# Patient Record
Sex: MALE | Race: BLACK OR AFRICAN AMERICAN | Employment: FULL TIME | URBAN - METROPOLITAN AREA
[De-identification: names, ages, dates, MRNs, and addresses within clinical notes are randomized per-mention and may not be internally consistent; named-entity substitution may affect disease eponyms.]

---

## 2017-01-06 DIAGNOSIS — I10 ESSENTIAL HYPERTENSION: ICD-10-CM

## 2017-01-06 RX ORDER — HYDROCHLOROTHIAZIDE 25 MG/1
TABLET ORAL
Qty: 90 TAB | Refills: 3 | Status: SHIPPED | OUTPATIENT
Start: 2017-01-06 | End: 2017-10-17 | Stop reason: SDUPTHER

## 2017-01-06 RX ORDER — AMLODIPINE BESYLATE 5 MG/1
TABLET ORAL
Qty: 90 TAB | Refills: 3 | Status: SHIPPED | OUTPATIENT
Start: 2017-01-06 | End: 2017-11-03 | Stop reason: SDUPTHER

## 2017-01-20 DIAGNOSIS — N52.9 ERECTILE DYSFUNCTION, UNSPECIFIED ERECTILE DYSFUNCTION TYPE: ICD-10-CM

## 2017-01-20 RX ORDER — TADALAFIL 10 MG/1
10 TABLET ORAL
Qty: 4 TAB | Refills: 3 | Status: SHIPPED | OUTPATIENT
Start: 2017-01-20 | End: 2018-06-14 | Stop reason: SDUPTHER

## 2017-01-27 ENCOUNTER — OFFICE VISIT (OUTPATIENT)
Dept: FAMILY MEDICINE CLINIC | Age: 62
End: 2017-01-27

## 2017-01-27 VITALS
DIASTOLIC BLOOD PRESSURE: 78 MMHG | SYSTOLIC BLOOD PRESSURE: 158 MMHG | HEIGHT: 67 IN | HEART RATE: 80 BPM | RESPIRATION RATE: 16 BRPM | TEMPERATURE: 97 F | BODY MASS INDEX: 26.45 KG/M2 | WEIGHT: 168.5 LBS

## 2017-01-27 DIAGNOSIS — B18.2 CHRONIC HEPATITIS C WITHOUT HEPATIC COMA (HCC): ICD-10-CM

## 2017-01-27 DIAGNOSIS — E78.5 HYPERLIPIDEMIA, UNSPECIFIED HYPERLIPIDEMIA TYPE: ICD-10-CM

## 2017-01-27 DIAGNOSIS — Z51.81 MEDICATION MONITORING ENCOUNTER: ICD-10-CM

## 2017-01-27 DIAGNOSIS — I10 ESSENTIAL HYPERTENSION: Primary | ICD-10-CM

## 2017-01-27 DIAGNOSIS — R73.01 ELEVATED FASTING BLOOD SUGAR: ICD-10-CM

## 2017-01-27 NOTE — PROGRESS NOTES
HISTORY OF PRESENT ILLNESS  Hunter Corbett is a 59 y.o. male. Chief Complaint   Patient presents with    Erectile Dysfunction    Hypertension Chronic problem, uncontrolled today has been stable at home, chronically uncontrolled Reports compliance with meds Reports compliance with meds     Cholesterol Problem Chronic problem, uncontrolled on diet for this eating oatmeal       high chol     Chronic hep c received and completed harvoni tx and awaiting anita of titer to see if was successful in eradicating, asymptomatic at present  HPI  Past Medical History   Diagnosis Date    BPH (benign prostatic hypertrophy) 10/15/2012    ED (erectile dysfunction) 9/30/2011    Hepatitis C     HTN (hypertension) 9/30/2011    Hyperlipidemia 9/30/2011     Current Outpatient Prescriptions   Medication Sig Dispense Refill    tadalafil (CIALIS) 10 mg tablet Take 1 Tab by mouth daily as needed. 4 Tab 3    hydroCHLOROthiazide (HYDRODIURIL) 25 mg tablet take 1 tablet by mouth once daily 90 Tab 3    amLODIPine (NORVASC) 5 mg tablet take 1 tablet by mouth once daily 90 Tab 3    lisinopril (PRINIVIL, ZESTRIL) 40 mg tablet take 1 tablet by mouth once daily 90 Tab 1    triamcinolone acetonide (KENALOG) 0.1 % topical cream Apply  to affected area two (2) times a day. use thin layer 454 g 0    aspirin 81 mg tablet Take 81 mg by mouth daily.  MULTIVITAMIN PO Take  by mouth daily.  KRILL OIL PO Take 1 Cap by mouth daily.  milk thistle 200 mg Cap Take 600 mg by mouth daily.  LIVER EXTRACT (LIVER PO) Take 400 mg by mouth daily. Allergies   Allergen Reactions    Sean [Amlodipine-Olmesartan] Palpitations and Other (comments)     Fatigue, headache, pt states \"kidneys hurt\"       ROS Respiratory: Negative for shortness of breath. Cardiovascular: Negative for chest pain. Genitourinary: Negative for frequency. Neurological: Negative for dizziness and headaches.         Visit Vitals    /89 (BP 1 Location: Right arm, BP Patient Position: Sitting)    Pulse 80    Temp 97 °F (36.1 °C) (Oral)    Resp 16    Ht 5' 7\" (1.702 m)    Wt 168 lb 8 oz (76.4 kg)    BMI 26.39 kg/m2       Physical Exam  Nursing note and vitals reviewed. Constitutional: He is oriented to person, place, and time. He appears well-developed and well-nourished. No distress. HENT:   Mouth/Throat: Oropharynx is clear and moist.   Neck: No JVD present. No thyromegaly present. Cardiovascular: Normal rate, regular rhythm and normal heart sounds. Pulmonary/Chest: Effort normal and breath sounds normal. No respiratory distress. He has no wheezes. He has no rales. Musculoskeletal: He exhibits no edema and no tenderness. Lymphadenopathy:     He has no cervical adenopathy. Neurological: He is alert and oriented to person, place, and time. Skin: No pallor. Psychiatric: He has a normal mood and affect. His behavior is normal.     ASSESSMENT and PLAN    ICD-10-CM ICD-9-CM    1. Essential hypertension uncontrolled improved some on anita Patient is asked to monitor BP at home or work, several times per month and return with written values at next office visit. Difficult to control hbp chronically and intolerant  W52 119.5 METABOLIC PANEL, COMPREHENSIVE   2. Elevated fasting blood sugar Check labs on follow up   L05.13 349.43 METABOLIC PANEL, COMPREHENSIVE      HEMOGLOBIN A1C W/O EAG   3. Hyperlipidemia, unspecified hyperlipidemia type on diet for this had improved last visit R39.4 436.0 METABOLIC PANEL, COMPREHENSIVE      LIPID PANEL   4. Chronic hepatitis C without hepatic coma (HCC) B18.2 070.54    5. Medication monitoring encounter F36.39 K74.61 METABOLIC PANEL, COMPREHENSIVE      LIPID PANEL      HEMOGLOBIN A1C W/O EAG   Follow-up Disposition:  Return in about 3 months (around 4/27/2017).

## 2017-01-27 NOTE — MR AVS SNAPSHOT
Visit Information Date & Time Provider Department Dept. Phone Encounter #  
 1/27/2017 10:00 AM Lay Reed MD [address] [phone] [id number] Follow-up Instructions Return in about 3 months (around 4/27/2017). Upcoming Health Maintenance Date Due COLONOSCOPY 3/12/2020 DTaP/Tdap/Td series (2 - Td) 10/2/2025 Allergies as of 1/27/2017  Review Complete On: 1/27/2017 By: Lay Reed MD  
  
 Severity Noted Reaction Type Reactions Sean [Amlodipine-olmesartan] High 08/15/2012    Palpitations, Other (comments) Fatigue, headache, pt states \"kidneys hurt\" Current Immunizations  Reviewed on 1/27/2017 Name Date Influenza Vaccine (Quad) PF 9/29/2016 Tdap 10/2/2015 Reviewed by Lay Reed MD on 1/27/2017 at 11:02 AM  
You Were Diagnosed With   
  
 Codes Comments Essential hypertension    -  Primary ICD-10-CM: I10 
ICD-9-CM: 401.9 Elevated fasting blood sugar     ICD-10-CM: R73.01 
ICD-9-CM: 790.21 Hyperlipidemia, unspecified hyperlipidemia type     ICD-10-CM: E78.5 ICD-9-CM: 272.4 Chronic hepatitis C without hepatic coma (HCC)     ICD-10-CM: B18.2 ICD-9-CM: 070.54 Medication monitoring encounter     ICD-10-CM: Z51.81 
ICD-9-CM: V58.83 Vitals BP Pulse Temp Resp Height(growth percentile) Weight(growth percentile) 158/78 80 97 °F (36.1 °C) (Oral) 16 5' 7\" (1.702 m) 168 lb 8 oz (76.4 kg) BMI Smoking Status 26.39 kg/m2 Former Smoker Vitals History BMI and BSA Data Body Mass Index Body Surface Area  
 26.39 kg/m 2 1.9 m 2 Preferred Pharmacy Pharmacy Name Phone [id number] [facility]. Suresh Fieldtos, [address] [facility]. [phone] Your Updated Medication List  
  
   
This list is accurate as of: 1/27/17 11:03 AM.  Always use your most recent med list. amLODIPine 5 mg tablet Commonly known as:  Isabelle Lofton take 1 tablet by mouth once daily  
  
 aspirin 81 mg tablet Take 81 mg by mouth daily. hydroCHLOROthiazide 25 mg tablet Commonly known as:  HYDRODIURIL  
take 1 tablet by mouth once daily KRILL OIL PO Take 1 Cap by mouth daily. lisinopril 40 mg tablet Commonly known as:  PRINIVIL, ZESTRIL  
take 1 tablet by mouth once daily LIVER PO Take 400 mg by mouth daily. milk thistle 200 mg Cap Take 600 mg by mouth daily. MULTIVITAMIN PO Take  by mouth daily. tadalafil 10 mg tablet Commonly known as:  CIALIS Take 1 Tab by mouth daily as needed. triamcinolone acetonide 0.1 % topical cream  
Commonly known as:  KENALOG Apply  to affected area two (2) times a day. use thin layer Follow-up Instructions Return in about 3 months (around 4/27/2017). To-Do List   
 04/27/2017 Lab:  HEMOGLOBIN A1C W/O EAG   
  
 04/27/2017 Lab:  LIPID PANEL   
  
 04/27/2017 Lab:  METABOLIC PANEL, COMPREHENSIVE Patient Instructions Please contact our office if you have any questions about your visit today. Introducing hospitals & HEALTH SERVICES! Dear Elizabeth Smith: Thank you for requesting a Fanzo account. Our records indicate that you have previously registered for a Fanzo account but its currently inactive. Please call our Fanzo support line at [phone]. Additional Information If you have questions, please visit the Frequently Asked Questions section of the Fanzo website at [email or web address] Remember, Fanzo is NOT to be used for urgent needs. For medical emergencies, dial 911. Now available from your iPhone and Android! Please provide this summary of care documentation to your next provider. Your primary care clinician is listed as AUGUSTINE FERRARI. If you have any questions after today's visit, please call [phone].

## 2017-01-27 NOTE — PROGRESS NOTES
Chief Complaint   Patient presents with    Erectile Dysfunction    Hypertension    Cholesterol Problem     high chol       1. Have you been to the ER, urgent care clinic since your last visit? Hospitalized since your last visit? No    2. Have you seen or consulted any other health care providers outside of the [address] [place] [address] since your last visit? Include any pap smears or colon screening.  No

## 2017-04-11 ENCOUNTER — HOSPITAL ENCOUNTER (OUTPATIENT)
Dept: LAB | Age: 62
End: 2017-04-11
Attending: INTERNAL MEDICINE
Payer: COMMERCIAL

## 2017-04-11 ENCOUNTER — HOSPITAL ENCOUNTER (OUTPATIENT)
Dept: ULTRASOUND IMAGING | Age: 62
Discharge: HOME | End: 2017-04-11
Attending: INTERNAL MEDICINE
Payer: COMMERCIAL

## 2017-04-11 ENCOUNTER — HOSPITAL ENCOUNTER (OUTPATIENT)
Dept: LAB | Age: 62
Discharge: HOME | End: 2017-04-11

## 2017-04-11 DIAGNOSIS — B18.2 HEPATITIS C, CHRONIC (HCC): ICD-10-CM

## 2017-04-11 LAB — SENTARA SPECIMEN COL,SENBCF: NORMAL

## 2017-04-11 PROCEDURE — 99001 SPECIMEN HANDLING PT-LAB: CPT | Performed by: INTERNAL MEDICINE

## 2017-04-11 PROCEDURE — 76705 ECHO EXAM OF ABDOMEN: CPT

## 2017-04-27 DIAGNOSIS — I10 ESSENTIAL HYPERTENSION: ICD-10-CM

## 2017-04-27 DIAGNOSIS — E78.5 HYPERLIPIDEMIA, UNSPECIFIED HYPERLIPIDEMIA TYPE: ICD-10-CM

## 2017-04-27 DIAGNOSIS — R73.01 ELEVATED FASTING BLOOD SUGAR: ICD-10-CM

## 2017-04-27 DIAGNOSIS — Z51.81 MEDICATION MONITORING ENCOUNTER: ICD-10-CM

## 2017-04-28 LAB
A-G RATIO,AGRAT: 1.6 RATIO (ref 1.1–2.6)
ALBUMIN SERPL-MCNC: 4.3 G/DL (ref 3.5–5)
ALP SERPL-CCNC: 61 U/L (ref 40–125)
ALT SERPL-CCNC: 69 U/L (ref 5–40)
ANION GAP SERPL CALC-SCNC: 12 MMOL/L
AST SERPL W P-5'-P-CCNC: 47 U/L (ref 10–37)
AVG GLU, 10930: 126 MG/DL (ref 91–123)
BILIRUB SERPL-MCNC: 0.4 MG/DL (ref 0.2–1.2)
BUN SERPL-MCNC: 17 MG/DL (ref 6–22)
CALCIUM SERPL-MCNC: 9.5 MG/DL (ref 8.4–10.4)
CHLORIDE SERPL-SCNC: 103 MMOL/L (ref 98–110)
CHOLEST SERPL-MCNC: 201 MG/DL (ref 110–200)
CO2 SERPL-SCNC: 28 MMOL/L (ref 20–32)
CREAT SERPL-MCNC: 1 MG/DL (ref 0.8–1.6)
GFRAA, 66117: >60
GFRNA, 66118: >60
GLOBULIN,GLOB: 2.7 G/DL (ref 2–4)
GLUCOSE SERPL-MCNC: 105 MG/DL (ref 65–99)
HBA1C MFR BLD HPLC: 6 % (ref 4.8–5.9)
HDLC SERPL-MCNC: 54 MG/DL (ref 40–59)
LDLC SERPL CALC-MCNC: 129 MG/DL (ref 50–99)
POTASSIUM SERPL-SCNC: 4.6 MMOL/L (ref 3.5–5.5)
PROT SERPL-MCNC: 7 G/DL (ref 6.2–8.1)
SODIUM SERPL-SCNC: 143 MMOL/L (ref 133–145)
TRIGL SERPL-MCNC: 93 MG/DL (ref 40–149)
VLDLC SERPL CALC-MCNC: 19 MG/DL (ref 8–30)

## 2017-05-04 ENCOUNTER — OFFICE VISIT (OUTPATIENT)
Dept: FAMILY MEDICINE CLINIC | Age: 62
End: 2017-05-04

## 2017-05-04 VITALS
HEART RATE: 72 BPM | WEIGHT: 177.5 LBS | BODY MASS INDEX: 27.86 KG/M2 | DIASTOLIC BLOOD PRESSURE: 78 MMHG | TEMPERATURE: 97.4 F | SYSTOLIC BLOOD PRESSURE: 148 MMHG | HEIGHT: 67 IN | OXYGEN SATURATION: 100 %

## 2017-05-04 DIAGNOSIS — B18.2 CHRONIC HEPATITIS C WITHOUT HEPATIC COMA (HCC): ICD-10-CM

## 2017-05-04 DIAGNOSIS — E78.5 HYPERLIPIDEMIA, UNSPECIFIED HYPERLIPIDEMIA TYPE: Primary | ICD-10-CM

## 2017-05-04 DIAGNOSIS — R73.03 PREDIABETES: ICD-10-CM

## 2017-05-04 DIAGNOSIS — I10 ESSENTIAL HYPERTENSION: ICD-10-CM

## 2017-05-04 NOTE — PROGRESS NOTES
HISTORY OF PRESENT ILLNESS  Michelle Erwin is a 59 y.o. male. Chief Complaint   Patient presents with    Hypertension,. Chronic problem, uncontrolled chronically bu a little better today Reports compliance with meds ,Asymptomatic, no headache or dizziness.  Cholesterol Problem Chronic problem, uncontrolled no meds for this, chronic hep c    Blood sugar problem     elevated fasting blood sugar prediabetes chronic problem, stable no meds on diet for this slightly increased     Hepatitis C chronic did not respond as well to Delmar Tiana tx as hoped, sees Dr. Annmarie Bosworth, started taking milk thistle and liver support    Labs   Continues to watch diet some and doing green smoothies    HPI  Past Medical History:   Diagnosis Date    BPH (benign prostatic hypertrophy) 10/15/2012    ED (erectile dysfunction) 9/30/2011    Hepatitis C     HTN (hypertension) 9/30/2011    Hyperlipidemia 9/30/2011     Current Outpatient Prescriptions   Medication Sig Dispense Refill    tadalafil (CIALIS) 10 mg tablet Take 1 Tab by mouth daily as needed. 4 Tab 3    hydroCHLOROthiazide (HYDRODIURIL) 25 mg tablet take 1 tablet by mouth once daily 90 Tab 3    amLODIPine (NORVASC) 5 mg tablet take 1 tablet by mouth once daily 90 Tab 3    lisinopril (PRINIVIL, ZESTRIL) 40 mg tablet take 1 tablet by mouth once daily 90 Tab 1    triamcinolone acetonide (KENALOG) 0.1 % topical cream Apply  to affected area two (2) times a day. use thin layer 454 g 0    aspirin 81 mg tablet Take 81 mg by mouth daily.  MULTIVITAMIN PO Take  by mouth daily.  milk thistle 200 mg Cap Take 600 mg by mouth daily.  LIVER EXTRACT (LIVER PO) Take 400 mg by mouth daily.  KRILL OIL PO Take 1 Cap by mouth daily. Allergies   Allergen Reactions    Sean [Amlodipine-Olmesartan] Palpitations and Other (comments)     Fatigue, headache, pt states \"kidneys hurt\"       ROS Respiratory: Negative for shortness of breath.     Cardiovascular: Negative for chest pain. Genitourinary: Negative for frequency. Neurological: Negative for dizziness and headaches. Visit Vitals    /75    Pulse 72    Temp 97.4 °F (36.3 °C) (Oral)    Ht 5' 7\" (1.702 m)    Wt 177 lb 8 oz (80.5 kg)    SpO2 100%    BMI 27.8 kg/m2       Physical Exam Nursing note and vitals reviewed. Constitutional: He is oriented to person, place, and time. He appears well-developed and well-nourished. No distress. HENT:   Mouth/Throat: Oropharynx is clear and moist.   Neck: No JVD present. No thyromegaly present. Cardiovascular: Normal rate, regular rhythm and normal heart sounds. Pulmonary/Chest: Effort normal and breath sounds normal. No respiratory distress. He has no wheezes. He has no rales. Abdominal: Soft. Bowel sounds are normal. He exhibits no distension. There is no tenderness. There is no rebound. Musculoskeletal: He exhibits no edema and no tenderness. Lymphadenopathy:     He has no cervical adenopathy. Neurological: He is alert and oriented to person, place, and time. Skin: No pallor. Psychiatric: He has a normal mood and affect. His behavior is normal.     Results for orders placed or performed in visit on 85/45/94   METABOLIC PANEL, COMPREHENSIVE   Result Value Ref Range    Glucose 105 (H) 65 - 99 mg/dL    BUN 17 6 - 22 mg/dL    Creatinine 1.0 0.8 - 1.6 mg/dL    Sodium 143 133 - 145 mmol/L    Potassium 4.6 3.5 - 5.5 mmol/L    Chloride 103 98 - 110 mmol/L    CO2 28 20 - 32 mmol/L    AST (SGOT) 47 (H) 10 - 37 U/L    ALT (SGPT) 69 (H) 5 - 40 U/L    Alk.  phosphatase 61 40 - 125 U/L    Bilirubin, total 0.4 0.2 - 1.2 mg/dL    Calcium 9.5 8.4 - 10.4 mg/dL    Protein, total 7.0 6.2 - 8.1 g/dL    Albumin 4.3 3.5 - 5.0 g/dL    A-G Ratio 1.6 1.1 - 2.6 ratio    Globulin 2.7 2.0 - 4.0 g/dL    Anion gap 12.0 mmol/L    GFRAA >60.0 >60.0    GFRNA >60.0 >60.0   LIPID PANEL   Result Value Ref Range    Triglyceride 93 40 - 149 mg/dL    HDL Cholesterol 54 40 - 59 mg/dL Cholesterol, total 201 (H) 110 - 200 mg/dL    LDL, calculated 129 (H) 50 - 99 mg/dL    VLDL, calculated 19 8 - 30 mg/dL   HEMOGLOBIN A1C W/O EAG   Result Value Ref Range    Hemoglobin A1c 6.0 (H) 4.8 - 5.9 %    AVG  (H) 91 - 123 mg/dL       ASSESSMENT and PLAN    ICD-10-CM ICD-9-CM    1. Hyperlipidemia, unspecified hyperlipidemia type mildly increased work on diet ,counseled on cv risk, chronic hep c so cant take statins E78.5 272.4    2. Prediabetes increased slightly R73.03 790.29    3. Essential hypertension mildly uncontrolled today but has improved some on anita asymptomatic, I10 401.9 EKG, 12 LEAD, INITIAL   4. Chronic hepatitis C without hepatic coma (HCC) B18.2 070.54    Follow-up Disposition:  Return in about 3 months (around 8/4/2017) for cpe/well male.

## 2017-05-04 NOTE — MR AVS SNAPSHOT
Visit Information Date & Time Provider Department Dept. Phone Encounter #  
 5/4/2017  1:00 PM [address] [phone] [id number] Follow-up Instructions Return in about 3 months (around 8/4/2017) for cpe/well male. Your Appointments 7/24/2017 11:30 AM  
New Patient with Alexandra Jacobs MD  
[facility] ([facility] Appt Note: NP ref:Dr. Metzger Aus [phone] relapse hep c office scheduled lsp 4/27/17  
 [id number] [facility] [place] [address] [place] [address] [place] [address] Upcoming Health Maintenance Date Due INFLUENZA AGE 9 TO ADULT 8/1/2017 COLONOSCOPY 3/12/2020 DTaP/Tdap/Td series (2 - Td) 10/2/2025 Allergies as of 5/4/2017  Review Complete On: 5/4/2017 By: Brynn Nielsen MD  
  
 Severity Noted Reaction Type Reactions Sean [Amlodipine-olmesartan] High 08/15/2012    Palpitations, Other (comments) Fatigue, headache, pt states \"kidneys hurt\" Current Immunizations  Reviewed on 1/27/2017 Name Date Influenza Vaccine (Quad) PF 9/29/2016 Tdap 10/2/2015 Not reviewed this visit You Were Diagnosed With   
  
 Codes Comments Hyperlipidemia, unspecified hyperlipidemia type    -  Primary ICD-10-CM: E78.5 ICD-9-CM: 272.4 Prediabetes     ICD-10-CM: R73.03 
ICD-9-CM: 790.29 Essential hypertension     ICD-10-CM: I10 
ICD-9-CM: 401.9 Chronic hepatitis C without hepatic coma (HCC)     ICD-10-CM: B18.2 ICD-9-CM: 070.54 Vitals BP Pulse Temp Height(growth percentile) Weight(growth percentile) SpO2  
 148/78 72 97.4 °F (36.3 °C) (Oral) 5' 7\" (1.702 m) 177 lb 8 oz (80.5 kg) 100% BMI Smoking Status 27.8 kg/m2 Former Smoker Vitals History BMI and BSA Data Body Mass Index Body Surface Area  
 27.8 kg/m 2 1.95 m 2 [facility] [address] [facility]. [phone] Your Updated Medication List  
  
   
This list is accurate as of: 5/4/17  1:47 PM.  Always use your most recent med list. amLODIPine 5 mg tablet Commonly known as:  NORVASC  
take 1 tablet by mouth once daily  
  
 aspirin 81 mg tablet Take 81 mg by mouth daily. hydroCHLOROthiazide 25 mg tablet Commonly known as:  HYDRODIURIL  
take 1 tablet by mouth once daily KRILL OIL PO Take 1 Cap by mouth daily. lisinopril 40 mg tablet Commonly known as:  PRINIVIL, ZESTRIL  
take 1 tablet by mouth once daily LIVER PO Take 400 mg by mouth daily. milk thistle 200 mg Cap Take 600 mg by mouth daily. MULTIVITAMIN PO Take  by mouth daily. tadalafil 10 mg tablet Commonly known as:  CIALIS Take 1 Tab by mouth daily as needed. triamcinolone acetonide 0.1 % topical cream  
Commonly known as:  KENALOG Apply  to affected area two (2) times a day. use thin layer Follow-up Instructions Return in about 3 months (around 8/4/2017) for cpe/well male. To-Do List   
 05/04/2017 ECG:  EKG, 12 LEAD, INITIAL Patient Instructions Please contact our office if you have any questions about your visit today. Introducing hospitals & HEALTH SERVICES! Dear Tej Eason: Thank you for requesting a Rodati account. Our records indicate that you have previously registered for a Rodati account but its currently inactive. Please call our Rodati support line at [phone]. Additional Information If you have questions, please visit the Frequently Asked Questions section of the Rodati website at [email or web address] Remember, Rodati is NOT to be used for urgent needs. For medical emergencies, dial 911. Now available from your iPhone and Android! Please provide this summary of care documentation to your next provider. Your primary care clinician is listed as AUGUSTINE FERRARI. If you have any questions after today's visit, please call [phone].

## 2017-05-04 NOTE — PROGRESS NOTES
Chief Complaint   Patient presents with    Hypertension    Cholesterol Problem    Blood sugar problem     elevated fasting blood sugar    Hepatitis C    Labs     1. Have you been to the ER, urgent care clinic since your last visit? Hospitalized since your last visit? No    2. Have you seen or consulted any other health care providers outside of the [address] [place] [address] since your last visit? Include any pap smears or colon screening.  No

## 2017-05-18 DIAGNOSIS — L30.9 ECZEMA, UNSPECIFIED TYPE: ICD-10-CM

## 2017-05-18 RX ORDER — TRIAMCINOLONE ACETONIDE 1 MG/G
CREAM TOPICAL 2 TIMES DAILY
Qty: 454 G | Refills: 0 | Status: SHIPPED | OUTPATIENT
Start: 2017-05-18 | End: 2018-11-02 | Stop reason: SDUPTHER

## 2017-06-29 DIAGNOSIS — I10 ESSENTIAL HYPERTENSION: ICD-10-CM

## 2017-06-30 RX ORDER — LISINOPRIL 40 MG/1
TABLET ORAL
Qty: 90 TAB | Refills: 1 | Status: SHIPPED | OUTPATIENT
Start: 2017-06-30 | End: 2017-10-17 | Stop reason: SDUPTHER

## 2017-07-24 ENCOUNTER — OFFICE VISIT (OUTPATIENT)
Dept: HEMATOLOGY | Age: 62
End: 2017-07-24

## 2017-07-24 VITALS
HEART RATE: 80 BPM | TEMPERATURE: 97 F | BODY MASS INDEX: 26.43 KG/M2 | WEIGHT: 168.4 LBS | RESPIRATION RATE: 16 BRPM | SYSTOLIC BLOOD PRESSURE: 154 MMHG | OXYGEN SATURATION: 95 % | HEIGHT: 67 IN | DIASTOLIC BLOOD PRESSURE: 85 MMHG

## 2017-07-24 DIAGNOSIS — B18.2 CHRONIC HEPATITIS C WITHOUT HEPATIC COMA (HCC): Primary | ICD-10-CM

## 2017-07-24 NOTE — PROGRESS NOTES
[address] [facility] [address], MD, [address], Cite Lidyai Slim   April ALNA Maher PA-C Sharlyn Mayor, NP Olegario Double, NP        at [facility]     [address] [facility], [address] [address]     [address] [facility].     [phone]     FAX: [phone]    at [address], [id number] [place] [address], [address]     [phone]     FAX: [phone]         Patient Care Team:  Carlos Leary MD as PCP - General (Family Practice)      Problem List  Date Reviewed: 7/24/2017          Codes Class Noted    Chronic hepatitis C without hepatic coma [facility]) ICD-10-CM: B18.2  ICD-9-CM: 070.54  8/3/2015        Hydrocele, right ICD-10-CM: N43.3  ICD-9-CM: 603.9  8/3/2015        BPH (benign prostatic hypertrophy) ICD-10-CM: N40.0  ICD-9-CM: 600.00  10/15/2012        Hyperlipidemia ICD-10-CM: E78.5  ICD-9-CM: 272.4  9/30/2011        HTN (hypertension) ICD-10-CM: I10  ICD-9-CM: 401.9  9/30/2011        ED (erectile dysfunction) ICD-10-CM: N52.9  ICD-9-CM: 607.84  9/30/2011                The physicians listed above have asked me to see Debbi Valle in consultation regarding chronic HCV and its management. All medical records sent by the referring physicians were reviewed including imaging studies. The patient is a 57 y.o. Black male who was noted to have abnormalities in liver chemistries and subsequently tested positive for chronic HCV in the  2000s. Risk factors for acquiring HCV are IV drug use, inhaling cocaine, tattoos, and mass vaccination after enlisting in the [facility] all between 8860-7299. There was an episode of acute incteric hepatitis at the time of these risk factors. Ultrasound of the liver was performed in 4/2017. The results of the imaging demonstrated a normal appearing liver. An assessment of liver fibrosis with Fibrosure was 0.43 consistent with stage F2. The patient was treated with Theador Hence from 4-6/2017. The patient was treated for 8 weeks. The patient tolerated treatment reasonably well. HCV RNA levels obtained during treatment response and relapse. The most recent laboratory studies indicate that the liver transaminases are elevated, alkaline phosphatase is normal, tests of hepatic synthetic and metabolic function are normal, and the platelet count is normal.      The patient has no symptoms which can be attributed to the liver disorder. The patient has not experienced fatigue, pain in the right side over the liver,     The patient completes all daily activities without any functional limitations. ALLERGIES  Allergies   Allergen Reactions    Sean [Amlodipine-Olmesartan] Palpitations and Other (comments)     Fatigue, headache, pt states \"kidneys hurt\"       MEDICATIONS  Current Outpatient Prescriptions   Medication Sig    lisinopril (PRINIVIL, ZESTRIL) 40 mg tablet take 1 tablet by mouth once daily    triamcinolone acetonide (KENALOG) 0.1 % topical cream Apply  to affected area two (2) times a day. use thin layer    tadalafil (CIALIS) 10 mg tablet Take 1 Tab by mouth daily as needed.  hydroCHLOROthiazide (HYDRODIURIL) 25 mg tablet take 1 tablet by mouth once daily    amLODIPine (NORVASC) 5 mg tablet take 1 tablet by mouth once daily    KRILL OIL PO Take 1 Cap by mouth daily.  aspirin 81 mg tablet Take 81 mg by mouth daily.  MULTIVITAMIN PO Take  by mouth daily.  milk thistle 200 mg Cap Take 600 mg by mouth daily.  LIVER EXTRACT (LIVER PO) Take 400 mg by mouth daily. No current facility-administered medications for this visit. SYSTEM REVIEW NOT RELATED TO LIVER DISEASE OR REVIEWED ABOVE:  Constitution systems: Negative for fever, chills, weight gain, weight loss. Eyes: Negative for visual changes. ENT: Negative for sore throat, painful swallowing. Respiratory: Negative for cough, hemoptysis, SOB. Cardiology: Negative for chest pain, palpitations.   GI: Negative for constipation or diarrhea. : Negative for urinary frequency, dysuria, hematuria, nocturia. Skin: Negative for rash. Hematology: Negative for easy bruising, blood clots. Musculo-skelatal: Negative for back pain, muscle pain, weakness. Neurologic: Negative for headaches, dizziness, vertigo, memory problems not related to HE. Psychology: Negative for anxiety, depression. FAMILY HISTORY:  The father  of alcoholism. The mother has the following chronic diseases: dementia. There is no family history of liver disease. SOCIAL HISTORY:  The patient is . The spouse has been tested for HCV and is negative. The patient has 3 children, 1 stepchildren, 1 adopted and 12 grandchildren. The patient stopped using tobacco products in . The patient consumes 1 alcoholic beverages per day. The patient currently works full time . PHYSICAL EXAMINATION:  Visit Vitals    /85 (BP 1 Location: Right arm, BP Patient Position: Sitting)    Pulse 80    Temp 97 °F (36.1 °C) (Oral)    Resp 16    Ht 5' 7\" (1.702 m)    Wt 168 lb 6.4 oz (76.4 kg)    SpO2 95%    BMI 26.38 kg/m2     General: No acute distress. Eyes: Sclera anicteric. ENT: No oral lesions. Thyroid normal.  Nodes: No adenopathy. Skin: No spider angiomata. No jaundice. No palmar erythema. Respiratory: Lungs clear to auscultation. Cardiovascular: Regular heart rate. No murmurs. No JVD. Abdomen: Soft non-tender. Liver size normal to percussion/palpation. Spleen not palpable. No obvious ascites. Extremities: No edema. No muscle wasting. No gross arthritic changes. Neurologic: Alert and oriented. Cranial nerves grossly intact. No asterixis. LABORATORY STUDIES:  From 2016  AST/ALT/ALP/T Bili/ALB:  38/44/63/0.4/4.3    SEROLOGIES:  2015. HCV RNA Log 6.5 IU/ml,   10/2016.   HAV total positive, HBsAntigen negative, anti-HBcore negative, anti-HBsurface negative, HCV Geneotype 1A, anti-HIV negative  12/2016. HCV RNA undetectable  1/2017. HCV RNA undetectable  4/2017. HCV RNA Log 6.06 IU/ml,     LIVER HISTOLOGY:  3/2015. HCV Fibrosure 0.43. Consistent with stage 2 fibrosis. 4/2017. HCV Fibrosure 0.55. Consistent with stage 2 fibrosis. ENDOSCOPIC PROCEDURES:  3/2015. Colonoscopy by Dr John Evangelista. No polyps    RADIOLOGY:  4/2017. Ultrasound of liver. Normal appearing liver. No liver mass lesions. OTHER TESTING:  Not available or performed    ASSESSMENT AND PLAN:  Chronic HCV with stage 2 fibrosis by Fibrosure. Liver function is normal.  The platelet count is normal.      Will perform serologic and virologic studies to assess for other causes of chronic liver disease. The Fibroscan can assess liver fibrosis and determine if a patient has advanced fibrosis or cirrhosis without the need for liver biopsy. The Fibroscan is currently available at liver [place]. This will be performed at the next office visit. The patient was previously treated for HCV with Georgetta Memory. There was a response then relapse. The patient has HCV genotype 1A. Discussed the treatment alternatives. The SVR/cure rate for HCV now exceeds 90% with just oral anti-viral therapy and no interferon injections or significant side effects for most patients with HCV. The specific treatment is dependent upon genotype, viral load and histology. The best option is to enroll in a clinical trial using the new Abbvie 2 drug combination Gleceprevir/Pprentasvir. This will be approved by the FDA in 8/2017 including for patients who have failed to achieve a SVR/cure with Harvoni and other non-protease containing DAA regimens. The patient agrees to enroll into the clinical trial    The patient was directed to continue all current medications at the current dosages. There are no contraindications for the patient to take any medications that are necessary for treatment of other medical issues.     The patient was counseled regarding alcohol consumption. The need for vaccination against viral hepatitis A and B will be assessed with serologic and instituted as appropriate. All of the above issues were discussed with the patient. All questions were answered. The patient expressed a clear understanding of the above. [address] [place] [address] 1-2 weeks for screening into the [place] DAA salvage study.     Wil Cerna MD  [facility] [place] [facility] [address] [place] [address], [address] [place] [address] Arnie Christine Salcedo, [address] [place] [address] Street - [address] [phone]

## 2017-07-24 NOTE — MR AVS SNAPSHOT
Visit Information Date & Time Provider Department Dept. Phone Encounter #  
 7/24/2017 11:30 AM Sameera Balderas MD [facility] [place] [phone] [id number] Your Appointments 8/4/2017  2:00 PM  
PHYSICAL with Olya Landrum MD  
[address] [facility] (--) Appt Note: cpe  
 [place] [id number] [phone]  
  
   
 [place] [id number] Upcoming Health Maintenance Date Due INFLUENZA AGE 9 TO ADULT 8/1/2017 COLONOSCOPY 3/12/2020 DTaP/Tdap/Td series (2 - Td) 10/2/2025 Allergies as of 7/24/2017  Review Complete On: 7/24/2017 By: Roselyn Alcala Severity Noted Reaction Type Reactions Sean [Amlodipine-olmesartan] High 08/15/2012    Palpitations, Other (comments) Fatigue, headache, pt states \"kidneys hurt\" Current Immunizations  Reviewed on 1/27/2017 Name Date Influenza Vaccine (Quad) PF 9/29/2016 Tdap 10/2/2015 Not reviewed this visit Vitals BP Pulse Temp Resp Height(growth percentile) Weight(growth percentile) 154/85 (BP 1 Location: Right arm, BP Patient Position: Sitting) 80 97 °F (36.1 °C) (Oral) 16 5' 7\" (1.702 m) 168 lb 6.4 oz (76.4 kg) SpO2 BMI Smoking Status 95% 26.38 kg/m2 Former Smoker Vitals History BMI and BSA Data Body Mass Index Body Surface Area  
 26.38 kg/m 2 1.9 m 2 Preferred Pharmacy Pharmacy Name Phone [id number]. Aditya Carter, [address] [facility] [phone] Your Updated Medication List  
  
   
This list is accurate as of: 7/24/17 12:28 PM.  Always use your most recent med list. amLODIPine 5 mg tablet Commonly known as:  NORVASC  
take 1 tablet by mouth once daily  
  
 aspirin 81 mg tablet Take 81 mg by mouth daily. hydroCHLOROthiazide 25 mg tablet Commonly known as:  HYDRODIURIL  
take 1 tablet by mouth once daily KRILL OIL PO Take 1 Cap by mouth daily. lisinopril 40 mg tablet Commonly known as:  PRINIVIL, ZESTRIL  
take 1 tablet by mouth once daily LIVER PO Take 400 mg by mouth daily. milk thistle 200 mg Cap Take 600 mg by mouth daily. MULTIVITAMIN PO Take  by mouth daily. tadalafil 10 mg tablet Commonly known as:  CIALIS Take 1 Tab by mouth daily as needed. triamcinolone acetonide 0.1 % topical cream  
Commonly known as:  KENALOG Apply  to affected area two (2) times a day. use thin layer Introducing [facility] SERVICES! Dear Jeff Zambrano: Thank you for requesting a [employer] Steps account. Our records indicate that you have previously registered for a Fit Steps account but its currently inactive. Please call our Fit Steps support line at [phone]. Additional Information If you have questions, please visit the Frequently Asked Questions section of the Fit Steps website at [email or web address] [email or web address] Remember, Fit Steps is NOT to be used for urgent needs. For medical emergencies, dial 911. Now available from your iPhone and Android! Please provide this summary of care documentation to your next provider. Your primary care clinician is listed as AUGUSITNE FERRARI. If you have any questions after today's visit, please call [phone].

## 2017-08-04 ENCOUNTER — OFFICE VISIT (OUTPATIENT)
Dept: FAMILY MEDICINE CLINIC | Age: 62
End: 2017-08-04

## 2017-08-04 VITALS
RESPIRATION RATE: 20 BRPM | HEART RATE: 85 BPM | BODY MASS INDEX: 27.39 KG/M2 | HEIGHT: 67 IN | WEIGHT: 174.5 LBS | SYSTOLIC BLOOD PRESSURE: 154 MMHG | TEMPERATURE: 96.6 F | DIASTOLIC BLOOD PRESSURE: 76 MMHG

## 2017-08-04 DIAGNOSIS — Z12.5 SCREENING FOR PROSTATE CANCER: ICD-10-CM

## 2017-08-04 DIAGNOSIS — R73.03 PREDIABETES: ICD-10-CM

## 2017-08-04 DIAGNOSIS — Z00.00 VISIT FOR WELL MAN HEALTH CHECK: Primary | ICD-10-CM

## 2017-08-04 DIAGNOSIS — Z51.81 MEDICATION MONITORING ENCOUNTER: ICD-10-CM

## 2017-08-04 DIAGNOSIS — B18.2 CHRONIC HEPATITIS C WITHOUT HEPATIC COMA (HCC): ICD-10-CM

## 2017-08-04 DIAGNOSIS — I10 ESSENTIAL HYPERTENSION: ICD-10-CM

## 2017-08-04 DIAGNOSIS — E78.5 HYPERLIPIDEMIA, UNSPECIFIED HYPERLIPIDEMIA TYPE: ICD-10-CM

## 2017-08-04 DIAGNOSIS — Z12.11 SCREEN FOR COLON CANCER: ICD-10-CM

## 2017-08-04 LAB
HEMOCCULT STL QL: NEGATIVE
VALID INTERNAL CONTROL?: YES

## 2017-08-04 NOTE — PROGRESS NOTES
Subjective:   Jennifer Hein is a 57 y.o. male presenting for his annual checkup. ROS:  Feeling well. No dyspnea or chest pain on exertion. No abdominal pain, change in bowel habits, black or bloody stools. No urinary tract or prostatic symptoms. No neurological complaints. Specific concerns today: hep c not responding to [facility], referred to Dr. Christian Serna, hepatology. Current Outpatient Prescriptions   Medication Sig Dispense Refill    lisinopril (PRINIVIL, ZESTRIL) 40 mg tablet take 1 tablet by mouth once daily 90 Tab 1    triamcinolone acetonide (KENALOG) 0.1 % topical cream Apply  to affected area two (2) times a day. use thin layer 454 g 0    tadalafil (CIALIS) 10 mg tablet Take 1 Tab by mouth daily as needed. 4 Tab 3    hydroCHLOROthiazide (HYDRODIURIL) 25 mg tablet take 1 tablet by mouth once daily 90 Tab 3    amLODIPine (NORVASC) 5 mg tablet take 1 tablet by mouth once daily 90 Tab 3    aspirin 81 mg tablet Take 81 mg by mouth daily.  MULTIVITAMIN PO Take  by mouth daily.  milk thistle 200 mg Cap Take 600 mg by mouth daily.  LIVER EXTRACT (LIVER PO) Take 400 mg by mouth daily.  KRILL OIL PO Take 1 Cap by mouth daily.        Allergies   Allergen Reactions    Sean [Amlodipine-Olmesartan] Palpitations and Other (comments)     Fatigue, headache, pt states \"kidneys hurt\"     Past Medical History:   Diagnosis Date    BPH (benign prostatic hypertrophy) 10/15/2012    ED (erectile dysfunction) 9/30/2011    Hepatitis C     HTN (hypertension) 9/30/2011    Hyperlipidemia 9/30/2011     Past Surgical History:   Procedure Laterality Date    HX ACL RECONSTRUCTION       Family History   Problem Relation Age of Onset    Hypertension Mother     Hypertension Father     Alcohol abuse Father     Hypertension Brother     Hypertension Brother      Social History   Substance Use Topics    Smoking status: Former Smoker    Smokeless tobacco: Never Used    Alcohol use No Objective:     Visit Vitals    /76 (BP 1 Location: Right arm, BP Patient Position: Sitting)  Comment: manual    Pulse 85    Temp 96.6 °F (35.9 °C) (Oral)    Resp 20    Ht 5' 7\" (1.702 m)    Wt 174 lb 8 oz (79.2 kg)    BMI 27.33 kg/m2     The patient appears well, alert, oriented x 3, in no distress. ENT normal.  Neck supple. No adenopathy or thyromegaly. LUIS. Lungs are clear, good air entry, no wheezes, rhonchi or rales. S1 and S2 normal, no murmurs, regular rate and rhythm. Abdomen is soft without tenderness, guarding, mass or organomegaly.  exam: no penile lesions or discharge, no testicular masses or tenderness, no hernias, rectum normal, no masses, prostate normal size, symmetric, no nodules or tenderness, guaiac negative brown stool, PROSTATE EXAM: smooth and symmetric without nodules or tenderness, enlarged 2+. Extremities show no edema, normal peripheral pulses. Neurological is normal without focal findings. Assessment/Plan:   healthy adult male  . ICD-10-CM ICD-9-CM    1. Visit for well man health check Z00.00 V70.0 AMB POC FECAL BLOOD, OCCULT, QL 1 CARD   2. Screening for prostate cancer Z12.5 V76.44 PROSTATE SPECIFIC AG (PSA)      PROSTATE SPECIFIC AG (PSA)   3. Screen for colon cancer Z12.11 V76.51 OCCULT BLOOD, IMMUNOASSAY (FIT)      AMB POC FECAL BLOOD, OCCULT, QL 1 CARD      OCCULT BLOOD, IMMUNOASSAY (FIT)   4. Chronic hepatitis C without hepatic coma (HCC) B18.2 070.54    5. Hyperlipidemia, unspecified hyperlipidemia type E78.5 272.4 LIPID PANEL      METABOLIC PANEL, COMPREHENSIVE   6. Essential hypertension G00 175.7 METABOLIC PANEL, COMPREHENSIVE   7. Prediabetes U21.40 745.24 METABOLIC PANEL, COMPREHENSIVE      HEMOGLOBIN A1C W/O EAG   8. Medication monitoring encounter Z51.81 V58.83 LIPID PANEL      METABOLIC PANEL, COMPREHENSIVE      HEMOGLOBIN A1C W/O EAG   . Follow-up Disposition:  Return in about 3 months (around 11/4/2017).

## 2017-08-04 NOTE — PROGRESS NOTES
Chief Complaint   Patient presents with    Complete Physical       Health Maintenance Due   Topic Date Due    INFLUENZA AGE 9 TO ADULT  08/01/2017       Health Maintenance reviewed     1. Have you been to the ER, urgent care clinic since your last visit? Hospitalized since your last visit? No    2. Have you seen or consulted any other health care providers outside of the [address] [place] [address] since your last visit? Include any pap smears or colon screening.  No

## 2017-08-04 NOTE — MR AVS SNAPSHOT
Visit Information Date & Time Provider Department Dept. Phone Encounter #  
 8/4/2017  2:00 PM Tej Braswell [facility] [phone] [id number] Follow-up Instructions Return in about 3 months (around 11/4/2017). Your Appointments 8/7/2017  9:00 AM  
Follow Up with Ying Rodriguez NP [facility] [place] [facility] ([address]) Appt Note: HCV/DAA Salvage-Consenting  
 [address] [facility] [address] [facility] Upcoming Health Maintenance Date Due INFLUENZA AGE 9 TO ADULT 8/1/2017 COLONOSCOPY 3/12/2020 DTaP/Tdap/Td series (2 - Td) 10/2/2025 Allergies as of 8/4/2017  Review Complete On: 8/4/2017 By: Tej Braswell MD  
  
 Severity Noted Reaction Type Reactions Sean [Amlodipine-olmesartan] High 08/15/2012    Palpitations, Other (comments) Fatigue, headache, pt states \"kidneys hurt\" Current Immunizations  Reviewed on 1/27/2017 Name Date Influenza Vaccine (Quad) PF 9/29/2016 Tdap 10/2/2015 Not reviewed this visit You Were Diagnosed With   
  
 Codes Comments Visit for well man health check    -  Primary ICD-10-CM: Z00.00 ICD-9-CM: V70.0 Screening for prostate cancer     ICD-10-CM: Z12.5 ICD-9-CM: V76.44 Screen for colon cancer     ICD-10-CM: Z12.11 ICD-9-CM: V76.51 Chronic hepatitis C without hepatic coma (HCC)     ICD-10-CM: B18.2 ICD-9-CM: 070.54 Vitals BP Pulse Temp Resp Height(growth percentile) Weight(growth percentile) 154/76 (BP 1 Location: Right arm, BP Patient Position: Sitting) 85 96.6 °F (35.9 °C) (Oral) 20 5' 7\" (1.702 m) 174 lb 8 oz (79.2 kg) BMI Smoking Status 27.33 kg/m2 Former Smoker Vitals History BMI and BSA Data Body Mass Index Body Surface Area  
 27.33 kg/m 2 1.93 m 2 Preferred Pharmacy Pharmacy Name Phone [id number]. Dona Wilkins, [address] . [phone] Your Updated Medication List  
  
   
This list is accurate as of: 8/4/17  3:41 PM.  Always use your most recent med list. amLODIPine 5 mg tablet Commonly known as:  NORVASC  
take 1 tablet by mouth once daily  
  
 aspirin 81 mg tablet Take 81 mg by mouth daily. hydroCHLOROthiazide 25 mg tablet Commonly known as:  HYDRODIURIL  
take 1 tablet by mouth once daily KRILL OIL PO Take 1 Cap by mouth daily. lisinopril 40 mg tablet Commonly known as:  PRINIVIL, ZESTRIL  
take 1 tablet by mouth once daily LIVER PO Take 400 mg by mouth daily. milk thistle 200 mg Cap Take 600 mg by mouth daily. MULTIVITAMIN PO Take  by mouth daily. tadalafil 10 mg tablet Commonly known as:  CIALIS Take 1 Tab by mouth daily as needed. triamcinolone acetonide 0.1 % topical cream  
Commonly known as:  KENALOG Apply  to affected area two (2) times a day. use thin layer We Performed the Following AMB POC FECAL BLOOD, OCCULT, QL 1 CARD [62337 CPT(R)] Follow-up Instructions Return in about 3 months (around 11/4/2017). To-Do List   
 08/04/2017 Lab:  OCCULT BLOOD, IMMUNOASSAY (FIT)   
  
 08/04/2017 Lab:  PROSTATE SPECIFIC AG (PSA) Patient Instructions Please contact our office if you have any questions about your visit today. Introducing [facility] & HEALTH SERVICES! Dear Liset Centeno: Thank you for requesting a [employer] - /l?k â€¢ splik/ account. Our records indicate that you have previously registered for a [employer] - /l?k â€¢ splik/ account but its currently inactive. Please call our [employer] - /l?k â€¢ splik/ support line at [phone]. Additional Information If you have questions, please visit the Frequently Asked Questions section of the [employer] - /l?k â€¢ splik/ website at [email or web address] Remember, MyChart is NOT to be used for urgent needs. For medical emergencies, dial 911. Now available from your iPhone and Android! Please provide this summary of care documentation to your next provider. Your primary care clinician is listed as AUGUSTINE FERRARI. If you have any questions after today's visit, please call [phone].

## 2017-08-05 LAB — PSA SERPL-MCNC: 0.86 NG/ML

## 2017-08-07 ENCOUNTER — HOSPITAL ENCOUNTER (OUTPATIENT)
Dept: LAB | Age: 62
Discharge: HOME | End: 2017-08-07

## 2017-08-07 ENCOUNTER — OFFICE VISIT (OUTPATIENT)
Dept: HEMATOLOGY | Age: 62
End: 2017-08-07

## 2017-08-07 VITALS
HEIGHT: 67 IN | OXYGEN SATURATION: 98 % | HEART RATE: 72 BPM | SYSTOLIC BLOOD PRESSURE: 167 MMHG | DIASTOLIC BLOOD PRESSURE: 83 MMHG | RESPIRATION RATE: 16 BRPM | TEMPERATURE: 97.1 F | WEIGHT: 173 LBS | BODY MASS INDEX: 27.15 KG/M2

## 2017-08-07 DIAGNOSIS — B18.2 CHRONIC HEPATITIS C WITHOUT HEPATIC COMA (HCC): Primary | ICD-10-CM

## 2017-08-07 PROCEDURE — 99000 SPECIMEN HANDLING OFFICE-LAB: CPT | Performed by: INTERNAL MEDICINE

## 2017-08-07 NOTE — PROGRESS NOTES
[facility], MD, FACP, Cite Clary Lee   April Elpidio April, MEG Philip NP Glean Bradley, NP        at [address], [address] [address]     [phone]     FAX: [phone]    at [address], [address] [facility], [address]     [phone]     FAX: [phone]         Patient Care Team:  Alexis Whiteside MD as PCP - General (Family Practice)  Luisa Cushing, MD (Gastroenterology)      Problem List  Date Reviewed: 8/4/2017          Codes Class Noted    Chronic hepatitis C without hepatic coma [facility]) ICD-10-CM: B18.2  ICD-9-CM: 070.54  8/3/2015        Hydrocele, right ICD-10-CM: N43.3  ICD-9-CM: 603.9  8/3/2015        BPH (benign prostatic hypertrophy) ICD-10-CM: N40.0  ICD-9-CM: 600.00  10/15/2012        Hyperlipidemia ICD-10-CM: E78.5  ICD-9-CM: 272.4  9/30/2011        HTN (hypertension) ICD-10-CM: I10  ICD-9-CM: 401.9  9/30/2011        ED (erectile dysfunction) ICD-10-CM: N52.9  ICD-9-CM: 607.84  9/30/2011                Vijay Berg returns to the [place] [address] today for education and management of chronic HCV. The active problem list, all pertinent past medical history, medications, liver histology, endoscopic studies, radiologic findings and laboratory findings related to the liver disorder were reviewed with the patient. The patient is a 57 y.o. Black male who was noted to have abnormalities in liver chemistries and subsequently tested positive for chronic HCV in the  2000s. Risk factors for acquiring HCV are IV drug use, inhaling cocaine, tattoos, and mass vaccination after enlisting in the [employer] [facility] all between 5492-2251. There was an episode of acute incteric hepatitis at the time of these risk factors. Ultrasound of the liver was performed in 4/2017. The results of the imaging demonstrated a normal appearing liver.       An assessment of liver fibrosis with Fibrosure was 0.43 consistent with stage F2. The patient was treated with Nikki Gins from 4-6/2017. The patient was treated for 8 weeks. The patient tolerated treatment reasonably well. HCV RNA levels obtained during treatment demonstrate a response followed by relapse. The most recent laboratory studies indicate that the liver transaminases are elevated, alkaline phosphatase is normal, tests of hepatic synthetic and metabolic function are normal, and the platelet count is normal.      The patient has no symptoms which can be attributed to the liver disorder. The patient completes all daily activities without any functional limitations. The patient has not experienced fatigue, fevers, chills, shortness of breath, chest pain, pain in the right side over the liver, diffuse abdominal pain, nausea, vomiting, constipation, diarrhrea, dry eyes, dry mouth, arthralgias, myalgias, yellowing of the eyes or skin, itching, dark urine, problems concentrating, swelling of the abdomen, swelling of the lower extremities, hematemesis, or hematochezia. ALLERGIES  Allergies   Allergen Reactions    Sean [Amlodipine-Olmesartan] Palpitations and Other (comments)     Fatigue, headache, pt states \"kidneys hurt\"       MEDICATIONS  Current Outpatient Prescriptions   Medication Sig    lisinopril (PRINIVIL, ZESTRIL) 40 mg tablet take 1 tablet by mouth once daily    triamcinolone acetonide (KENALOG) 0.1 % topical cream Apply  to affected area two (2) times a day. use thin layer    tadalafil (CIALIS) 10 mg tablet Take 1 Tab by mouth daily as needed.  hydroCHLOROthiazide (HYDRODIURIL) 25 mg tablet take 1 tablet by mouth once daily    amLODIPine (NORVASC) 5 mg tablet take 1 tablet by mouth once daily    aspirin 81 mg tablet Take 81 mg by mouth daily.  MULTIVITAMIN PO Take  by mouth daily. No current facility-administered medications for this visit.         SYSTEM REVIEW NOT RELATED TO LIVER DISEASE OR REVIEWED ABOVE:  Constitution systems: Negative for fever, chills, weight gain, weight loss. Eyes: Negative for visual changes. ENT: Negative for sore throat, painful swallowing. Respiratory: Negative for cough, hemoptysis, SOB. Cardiology: Negative for chest pain, palpitations. GI:  Negative for constipation or diarrhea. : Negative for urinary frequency, dysuria, hematuria, nocturia. Skin: Negative for rash. Hematology: Negative for easy bruising, blood clots. Musculo-skelatal: Negative for back pain, muscle pain, weakness. Neurologic: Negative for headaches, dizziness, vertigo, memory problems not related to HE. Psychology: Negative for anxiety, depression. FAMILY HISTORY:  The father  of alcoholism. The mother has the following chronic diseases: dementia. There is no family history of liver disease. SOCIAL HISTORY:  The patient is . The spouse has been tested for HCV and is negative. The patient has 3 children, 1 stepchildren, 1 adopted and 12 grandchildren. The patient stopped using tobacco products in 1970s. The patient consumes 1 alcoholic beverages per day. The patient currently works full time [employer]. PHYSICAL EXAMINATION:  Visit Vitals    /83 (BP 1 Location: Left arm, BP Patient Position: Sitting)    Pulse 72    Temp 97.1 °F (36.2 °C) (Tympanic)    Resp 16    Ht 5' 7\" (1.702 m)    Wt 173 lb (78.5 kg)    SpO2 98%    BMI 27.1 kg/m2     General: No acute distress. Eyes: Sclera anicteric. ENT: No oral lesions. Thyroid normal.  Nodes: No adenopathy. Skin: No spider angiomata. No jaundice. No palmar erythema. Respiratory: Lungs clear to auscultation. Cardiovascular: Regular heart rate. No murmurs. No JVD. Abdomen: Soft non-tender. Liver size normal to percussion/palpation. Spleen not palpable. No obvious ascites. Extremities: No edema. No muscle wasting.   No gross arthritic changes. Neurologic: Alert and oriented. Cranial nerves grossly intact. No asterixis. LABORATORY STUDIES:  Liver [place] of [address] & Units 4/27/2017 9/19/2016 2/29/2016   WBC 4.0 - 11.0 K/uL      ANC 1.8 - 7.7 K/uL      HGB 13.1 - 17.2 g/dL       - 440 K/uL      AST 10 - 37 U/L 47 (H) 36 42 (H)   ALT 5 - 40 U/L 69 (H) 45 (H) 60 (H)   Alk Phos 40 - 125 U/L 61 63 64   Bili, Total 0.2 - 1.2 mg/dL 0.4 0.4 0.3   Albumin 3.5 - 5.0 g/dL 4.3 4.2 4.4   BUN 6 - 22 mg/dL 17 15 12   Creat 0.8 - 1.6 mg/dL 1.0 1.1 1.1   Na 133 - 145 mmol/L 143 137 144   K 3.5 - 5.5 mmol/L 4.6 4.1 4.4   Cl 98 - 110 mmol/L 103 99 103   CO2 20 - 32 mmol/L 28 26 27   Glucose 65 - 99 mg/dL 105 (H) 103 (H) 102 (H)     SEROLOGIES:  1/2015. HCV RNA Log 6.5 IU/ml,   10/2016. HAV total positive, HBsAntigen negative, anti-HBcore negative, anti-HBsurface negative, HCV Geneotype 1A, anti-HIV negative  12/2016. HCV RNA undetectable  1/2017. HCV RNA undetectable  4/2017. HCV RNA Log 6.06 IU/ml,     LIVER HISTOLOGY:  3/2015. HCV Fibrosure 0.43. Consistent with stage 2 fibrosis. 4/2017. HCV Fibrosure 0.55. Consistent with stage 2 fibrosis. ENDOSCOPIC PROCEDURES:  3/2015. Colonoscopy by Dr Afsaneh Alanis. No polyps    RADIOLOGY:  4/2017. Ultrasound of liver. Normal appearing liver. No liver mass lesions. OTHER TESTING:  Not available or performed    ASSESSMENT AND PLAN:  Chronic HCV with stage 2 fibrosis by Fibrosure. The most recent laboratory studies indicate that the liver transaminases are elevated, alkaline phosphatase is normal, tests of hepatic synthetic and metabolic function are normal, and the platelet count is normal.      The patient was previously treated for HCV with Quentin Hdez. There was a response then relapse. The patient has HCV genotype 1A. Today, we discussed re-treatment options. The best option is to enroll in a clinical trial using the new Abbvie 2 drug combination Gleceprevir/Pprentasvir. This will be approved by the FDA in 8/2017 including for patients who have failed to achieve a SVR/cure with Harvoni and other non-protease containing DAA regimens. An informed consent was provided and examined in detail. After reading the consent, the patient decided that he would like to participate in the study. All questions were addressed and the patient verbalized clear understanding of the study. The consent was then witnessed and signed. The patient was directed to continue all current medications at the current dosages. There are no contraindications for the patient to take any medications that are necessary for treatment of other medical issues. The patient was counseled regarding alcohol consumption. Vaccination for viral hepatitis A is not required. The patient has serologic evidence of prior exposure or vaccination with immunity. Vaccination for viral hepatitis B is recommended. The patient does not have serologic evidence of prior exposure or vaccination with immunity. All of the above issues were discussed with the patient. All questions were answered. The patient expressed a clear understanding of the above. 30 minutes total time spent with this patient with more than 50% of this time spent counseling and coordinating care as described above. Follow-up Doron Watson 32 for day one of the study.       Clary Ferguson NP   Liver [place] of [address] [place] [address], [address] [facility], [facility] [facility]   [phone]

## 2017-08-08 LAB — HEMOCCULT STL QL IA: NEGATIVE

## 2017-08-16 ENCOUNTER — HOSPITAL ENCOUNTER (OUTPATIENT)
Dept: LAB | Age: 62
Discharge: HOME | End: 2017-08-16

## 2017-08-16 PROCEDURE — 99000 SPECIMEN HANDLING OFFICE-LAB: CPT | Performed by: INTERNAL MEDICINE

## 2017-08-31 ENCOUNTER — HOSPITAL ENCOUNTER (OUTPATIENT)
Dept: LAB | Age: 62
Discharge: HOME | End: 2017-08-31

## 2017-08-31 ENCOUNTER — OFFICE VISIT (OUTPATIENT)
Dept: HEMATOLOGY | Age: 62
End: 2017-08-31

## 2017-08-31 VITALS
HEART RATE: 78 BPM | WEIGHT: 174.4 LBS | DIASTOLIC BLOOD PRESSURE: 85 MMHG | OXYGEN SATURATION: 99 % | RESPIRATION RATE: 16 BRPM | BODY MASS INDEX: 27.37 KG/M2 | TEMPERATURE: 96.9 F | HEIGHT: 67 IN | SYSTOLIC BLOOD PRESSURE: 165 MMHG

## 2017-08-31 DIAGNOSIS — B18.2 CHRONIC HEPATITIS C WITHOUT HEPATIC COMA (HCC): Primary | ICD-10-CM

## 2017-08-31 PROCEDURE — 99000 SPECIMEN HANDLING OFFICE-LAB: CPT | Performed by: INTERNAL MEDICINE

## 2017-08-31 NOTE — PROGRESS NOTES
[address] Larissa Sampson MD, [address], [facility] [place] [place]       Anju Roberts, MEG Claudio, [facility] Winsome, ALAN Dove NP        at [facility]     [address], [address] [address]     [phone]     FAX: [phone]    at [address], [address] [facility], [address]     [phone]     FAX: [phone]         Patient Care Team:  Matthias Vasquez MD as PCP - General (Family Practice)  Mikel Hull MD (Gastroenterology)      Problem List  Date Reviewed: 8/31/2017          Codes Class Noted    Chronic hepatitis C without hepatic coma [facility]) ICD-10-CM: B18.2  ICD-9-CM: 070.54  8/3/2015        Hydrocele, right ICD-10-CM: N43.3  ICD-9-CM: 603.9  8/3/2015        BPH (benign prostatic hypertrophy) ICD-10-CM: N40.0  ICD-9-CM: 600.00  10/15/2012        Hyperlipidemia ICD-10-CM: E78.5  ICD-9-CM: 272.4  9/30/2011        HTN (hypertension) ICD-10-CM: I10  ICD-9-CM: 401.9  9/30/2011        ED (erectile dysfunction) ICD-10-CM: N52.9  ICD-9-CM: 607.84  9/30/2011                Kaylan Grayson returns to the [place] [address] today for education and management of chronic HCV. The patient is here for day one of the Salvage clinical trial.      The active problem list, all pertinent past medical history, medications, liver histology, endoscopic studies, radiologic findings and laboratory findings related to the liver disorder were reviewed with the patient. The patient is a 57 y.o. Black male who was noted to have abnormalities in liver chemistries and subsequently tested positive for chronic HCV in the  2000s. Risk factors for acquiring HCV are IV drug use, inhaling cocaine, tattoos, and mass vaccination after enlisting in the [facility] all between 8454-7253. There was an episode of acute incteric hepatitis at the time of these risk factors. Ultrasound of the liver was performed in 4/2017. The results of the imaging demonstrated a normal appearing liver. An assessment of liver fibrosis with Fibrosure was 0.43 consistent with stage F2. The patient was treated with Quentin Hdez from 4-6/2017. The patient was treated for 8 weeks. The patient tolerated treatment reasonably well. HCV RNA levels obtained during treatment demonstrate a response followed by relapse. The most recent laboratory studies indicate that the liver transaminases are elevated, alkaline phosphatase is normal, tests of hepatic synthetic and metabolic function are normal, and the platelet count is normal.      The patient has no symptoms which can be attributed to the liver disorder. The patient completes all daily activities without any functional limitations. The patient has not experienced fatigue, fevers, chills, shortness of breath, chest pain, pain in the right side over the liver, diffuse abdominal pain, nausea, vomiting, constipation, diarrhrea, dry eyes, dry mouth, arthralgias, myalgias, yellowing of the eyes or skin, itching, dark urine, problems concentrating, swelling of the abdomen, swelling of the lower extremities, hematemesis, or hematochezia. ALLERGIES  Allergies   Allergen Reactions    Sean [Amlodipine-Olmesartan] Palpitations and Other (comments)     Fatigue, headache, pt states \"kidneys hurt\"       MEDICATIONS  Current Outpatient Prescriptions   Medication Sig    lisinopril (PRINIVIL, ZESTRIL) 40 mg tablet take 1 tablet by mouth once daily    triamcinolone acetonide (KENALOG) 0.1 % topical cream Apply  to affected area two (2) times a day. use thin layer    tadalafil (CIALIS) 10 mg tablet Take 1 Tab by mouth daily as needed.  hydroCHLOROthiazide (HYDRODIURIL) 25 mg tablet take 1 tablet by mouth once daily    amLODIPine (NORVASC) 5 mg tablet take 1 tablet by mouth once daily    aspirin 81 mg tablet Take 81 mg by mouth daily.       MULTIVITAMIN PO Take  by mouth daily. No current facility-administered medications for this visit. SYSTEM REVIEW NOT RELATED TO LIVER DISEASE OR REVIEWED ABOVE:  Constitution systems: Negative for fever, chills, weight gain, weight loss. Eyes: Negative for visual changes. ENT: Negative for sore throat, painful swallowing. Respiratory: Negative for cough, hemoptysis, SOB. Cardiology: Negative for chest pain, palpitations. GI:  Negative for constipation or diarrhea. : Negative for urinary frequency, dysuria, hematuria, nocturia. Skin: Negative for rash. Hematology: Negative for easy bruising, blood clots. Musculo-skelatal: Negative for back pain, muscle pain, weakness. Neurologic: Negative for headaches, dizziness, vertigo, memory problems not related to HE. Psychology: Negative for anxiety, depression. FAMILY HISTORY:  The father  of alcoholism. The mother has the following chronic diseases: dementia. There is no family history of liver disease. SOCIAL HISTORY:  The patient is . The spouse has been tested for HCV and is negative. The patient has 3 children, 1 stepchildren, 1 adopted and 12 grandchildren. The patient stopped using tobacco products in 1970s. The patient consumes 1 alcoholic beverages per day. The patient currently works full time [employer]. PHYSICAL EXAMINATION:  Visit Vitals    /85 (BP 1 Location: Right arm, BP Patient Position: Sitting)    Pulse 78    Temp 96.9 °F (36.1 °C) (Tympanic)    Resp 16    Ht 5' 7\" (1.702 m)    Wt 174 lb 6.4 oz (79.1 kg)    SpO2 99%    BMI 27.31 kg/m2     General: No acute distress. Eyes: Sclera anicteric. ENT: No oral lesions. Thyroid normal.  Nodes: No adenopathy. Skin: No spider angiomata. No jaundice. No palmar erythema. Respiratory: Lungs clear to auscultation. Cardiovascular: Regular heart rate. No murmurs. No JVD. Abdomen: Soft non-tender.   Liver size normal to percussion/palpation. Spleen not palpable. No obvious ascites. Extremities: No edema. No muscle wasting. No gross arthritic changes. Neurologic: Alert and oriented. Cranial nerves grossly intact. No asterixis. LABORATORY STUDIES:  Liver [place] of [place] & Units 4/27/2017 9/19/2016 2/29/2016   WBC 4.0 - 11.0 K/uL      ANC 1.8 - 7.7 K/uL      HGB 13.1 - 17.2 g/dL       - 440 K/uL      AST 10 - 37 U/L 47 (H) 36 42 (H)   ALT 5 - 40 U/L 69 (H) 45 (H) 60 (H)   Alk Phos 40 - 125 U/L 61 63 64   Bili, Total 0.2 - 1.2 mg/dL 0.4 0.4 0.3   Albumin 3.5 - 5.0 g/dL 4.3 4.2 4.4   BUN 6 - 22 mg/dL 17 15 12   Creat 0.8 - 1.6 mg/dL 1.0 1.1 1.1   Na 133 - 145 mmol/L 143 137 144   K 3.5 - 5.5 mmol/L 4.6 4.1 4.4   Cl 98 - 110 mmol/L 103 99 103   CO2 20 - 32 mmol/L 28 26 27   Glucose 65 - 99 mg/dL 105 (H) 103 (H) 102 (H)     SEROLOGIES:  1/2015. HCV RNA Log 6.5 IU/ml,   10/2016. HAV total positive, HBsAntigen negative, anti-HBcore negative, anti-HBsurface negative, HCV Geneotype 1A, anti-HIV negative  12/2016. HCV RNA undetectable  1/2017. HCV RNA undetectable  4/2017. HCV RNA Log 6.06 IU/ml,     LIVER HISTOLOGY:  3/2015. HCV Fibrosure 0.43. Consistent with stage 2 fibrosis. 4/2017. HCV Fibrosure 0.55. Consistent with stage 2 fibrosis. ENDOSCOPIC PROCEDURES:  3/2015. Colonoscopy by Dr Kandice Daigle. No polyps    RADIOLOGY:  4/2017. Ultrasound of liver. Normal appearing liver. No liver mass lesions. OTHER TESTING:  Not available or performed    ASSESSMENT AND PLAN:  Chronic HCV with stage 2 fibrosis by Fibrosure. The most recent laboratory studies indicate that the liver transaminases are elevated, alkaline phosphatase is normal, tests of hepatic synthetic and metabolic function are normal, and the platelet count is normal.      The patient was previously treated for HCV with Reynold Verduzco. There was a response then relapse. The patient has HCV genotype 1A.   The patient is enrolled DAA-Salvage  clinical trial for treatment experienced patients previously exposed to NS5A inhibitor + SOF. He is in the 12 week arm for non-cirrhotics. This is day one. The patient was directed to continue all current medications at the current dosages. There are no contraindications for the patient to take any medications that are necessary for treatment of other medical issues. The patient was counseled regarding alcohol consumption. Vaccination for viral hepatitis A is not required. The patient has serologic evidence of prior exposure or vaccination with immunity. Vaccination for viral hepatitis B is recommended. The patient does not have serologic evidence of prior exposure or vaccination with immunity. All of the above issues were discussed with the patient. All questions were answered. The patient expressed a clear understanding of the above. [address] [place] [address] in 4 weeks for study protocol.      Edith Gross NP   Liver [place] of [address] [place] [address], [address] [place] [address] Inez Canela, [address] [place] [address]   [phone]

## 2017-08-31 NOTE — MR AVS SNAPSHOT
Visit Information Date & Time Provider Department Dept. Phone Encounter #  
 8/31/2017  8:00 AM Oskar Jimenez NP [address] of  [address] [id number] Follow-up Instructions Return in about 4 days (around 9/4/2017). Your Appointments 9/28/2017  8:30 AM  
[place] [address] [facility] ([facility]) Appt Note: MARLIN Santos wk 4 f/up [facility], [facility] [address] [facility] [address]  
  
   
 [address] [facility] Jaime Sharp [address] [address]  
  
    
 11/3/2017  1:15 PM  
Follow Up with Lauraine Denver, MD [address] (--) Appt Note: follow up Alysa Te[place]a [place] [address]  
[phone]  
  
   
 [place] [address] Upcoming Health Maintenance Date Due INFLUENZA AGE 9 TO ADULT 8/1/2017 COLONOSCOPY 3/12/2020 DTaP/Tdap/Td series (2 - Td) 10/2/2025 Allergies as of 8/31/2017  Review Complete On: 8/31/2017 By: Sumi Riley Severity Noted Reaction Type Reactions Sean [Amlodipine-olmesartan] High 08/15/2012    Palpitations, Other (comments) Fatigue, headache, pt states \"kidneys hurt\" Current Immunizations  Reviewed on 1/27/2017 Name Date Influenza Vaccine (Quad) PF 9/29/2016 Tdap 10/2/2015 Not reviewed this visit Vitals BP Pulse Temp Resp Height(growth percentile) 165/85 (BP 1 Location: Right arm, BP Patient Position: Sitting) 78 96.9 °F (36.1 °C) (Tympanic) 16 5' 7\" (1.702 m) Weight(growth percentile) SpO2 BMI Smoking Status 174 lb 6.4 oz (79.1 kg) 99% 27.31 kg/m2 Former Smoker BMI and BSA Data Body Mass Index Body Surface Area  
 27.31 kg/m 2 1.93 m 2 Preferred Pharmacy Pharmacy Name Phone [id number] Jaron [place], [address] [facility] [phone] Your Updated Medication List  
  
 This list is accurate as of: 8/31/17  8:18 AM.  Always use your most recent med list. amLODIPine 5 mg tablet Commonly known as:  NORVASC  
take 1 tablet by mouth once daily  
  
 aspirin 81 mg tablet Take 81 mg by mouth daily. hydroCHLOROthiazide 25 mg tablet Commonly known as:  HYDRODIURIL  
take 1 tablet by mouth once daily  
  
 lisinopril 40 mg tablet Commonly known as:  PRINIVIL, ZESTRIL  
take 1 tablet by mouth once daily MULTIVITAMIN PO Take  by mouth daily. tadalafil 10 mg tablet Commonly known as:  CIALIS Take 1 Tab by mouth daily as needed. triamcinolone acetonide 0.1 % topical cream  
Commonly known as:  KENALOG Apply  to affected area two (2) times a day. use thin layer Follow-up Instructions Return in about 4 days (around 9/4/2017). Introducing [facility] & HE[facility]! Dear Funmilayo Crisostomo: Thank you for requesting a Theravasc account. Our records indicate that you have previously registered for a Theravasc account but its currently inactive. Please call our Theravasc support line at [phone]. Additional Information If you have questions, please visit the Frequently Asked Questions section of the Theravasc website at [email or web address] Remember, Theravasc is NOT to be used for urgent needs. For medical emergencies, dial 911. Now available from your iPhone and Android! Please provide this summary of care documentation to your next provider. Your primary care clinician is listed as AUGUSTINE FERRARI. If you have any questions after today's visit, please call [phone].

## 2017-09-28 ENCOUNTER — HOSPITAL ENCOUNTER (OUTPATIENT)
Dept: LAB | Age: 62
Discharge: HOME | End: 2017-09-28

## 2017-09-28 ENCOUNTER — OFFICE VISIT (OUTPATIENT)
Dept: HEMATOLOGY | Age: 62
End: 2017-09-28

## 2017-09-28 VITALS
OXYGEN SATURATION: 99 % | DIASTOLIC BLOOD PRESSURE: 88 MMHG | HEART RATE: 78 BPM | WEIGHT: 177 LBS | TEMPERATURE: 97.2 F | BODY MASS INDEX: 27.78 KG/M2 | SYSTOLIC BLOOD PRESSURE: 173 MMHG | HEIGHT: 67 IN | RESPIRATION RATE: 16 BRPM

## 2017-09-28 DIAGNOSIS — B18.2 CHRONIC HEPATITIS C WITHOUT HEPATIC COMA (HCC): Primary | ICD-10-CM

## 2017-09-28 DIAGNOSIS — B18.2 CHRONIC HEPATITIS C WITHOUT HEPATIC COMA (HCC): ICD-10-CM

## 2017-09-28 LAB — SENTARA SPECIMEN COL,SENBCF: NORMAL

## 2017-09-28 PROCEDURE — 99001 SPECIMEN HANDLING PT-LAB: CPT | Performed by: NURSE PRACTITIONER

## 2017-09-28 NOTE — MR AVS SNAPSHOT
Visit Information Date & Time Provider Department Dept. Phone Encounter #  
 9/28/2017  8:30 AM [facility] [address] [id number] Follow-up Instructions Return in about 4 weeks (around 10/26/2017). Your Appointments 11/3/2017  1:15 PM  
Follow Up with Olya Landrum MD  
[facility] (--) Appt Note: follow up [place] [address] [address] [address] [phone]  
  
   
 Alysa [address] [address] [address] [address] Upcoming Health Maintenance Date Due INFLUENZA AGE 9 TO ADULT 8/1/2017 COLONOSCOPY 3/12/2020 DTaP/Tdap/Td series (2 - Td) 10/2/2025 Allergies as of 9/28/2017  Review Complete On: 9/28/2017 By: Laney West Severity Noted Reaction Type Reactions Sean [Amlodipine-olmesartan] High 08/15/2012    Palpitations, Other (comments) Fatigue, headache, pt states \"kidneys hurt\" Current Immunizations  Reviewed on 1/27/2017 Name Date Influenza Vaccine (Quad) PF 9/29/2016 Tdap 10/2/2015 Not reviewed this visit You Were Diagnosed With   
  
 Codes Comments Chronic hepatitis C without hepatic coma (HCC)    -  Primary ICD-10-CM: B18.2 ICD-9-CM: 070.54 Vitals BP Pulse Temp Resp Height(growth percentile) 173/88 (BP 1 Location: Left arm, BP Patient Position: Sitting) 78 97.2 °F (36.2 °C) (Tympanic) 16 5' 7\" (1.702 m) Weight(growth percentile) SpO2 BMI Smoking Status 177 lb (80.3 kg) 99% 27.72 kg/m2 Former Smoker BMI and BSA Data Body Mass Index Body Surface Area  
 27.72 kg/m 2 1.95 m 2 Preferred Pharmacy Pharmacy Name Phone [id number] [address] [facility] [phone] Your Updated Medication List  
  
   
This list is accurate as of: 9/28/17  8:35 AM.  Always use your most recent med list. amLODIPine 5 mg tablet Commonly known as:  NORVASC  
take 1 tablet by mouth once daily  
  
 aspirin 81 mg tablet Take 81 mg by mouth daily. hydroCHLOROthiazide 25 mg tablet Commonly known as:  HYDRODIURIL  
take 1 tablet by mouth once daily  
  
 lisinopril 40 mg tablet Commonly known as:  PRINIVIL, ZESTRIL  
take 1 tablet by mouth once daily MULTIVITAMIN PO Take  by mouth daily. tadalafil 10 mg tablet Commonly known as:  CIALIS Take 1 Tab by mouth daily as needed. triamcinolone acetonide 0.1 % topical cream  
Commonly known as:  KENALOG Apply  to affected area two (2) times a day. use thin layer Follow-up Instructions Return in about 4 weeks (around 10/26/2017). To-Do List   
 09/28/2017 Lab:  CBC WITH AUTOMATED DIFF   
  
 09/28/2017 Lab:  HCV, QT WITH GRAPH   
  
 09/28/2017 Lab:  HEPATIC FUNCTION PANEL   
  
 09/28/2017 Lab:  METABOLIC PANEL, BASIC Introducing [facility]! Dear Shi Ill: Thank you for requesting a [employer] account. Our records indicate that you have previously registered for a [employer] account but its currently inactive. Please call our [employer] support line at [phone]. Additional Information If you have questions, please visit the Frequently Asked Questions section of the [employer] website at [email or web address] Remember, [employer] is NOT to be used for urgent needs. For medical emergencies, dial 911. Now available from your iPhone and Android! Please provide this summary of care documentation to your next provider. Your primary care clinician is listed as AUGUSTINE FERRARI. If you have any questions after today's visit, please call [phone].

## 2017-09-28 NOTE — PROGRESS NOTES
[address] Sampson MD, [facility], [facility] [place], [place]       ALAN Paula PA-C Recardo Lorenzo, [facility]   ALAN Agustin NP        at [address], [address] [address]     [phone]     FAX: [phone]    at [address], [id number] [place] [address], [address]     [phone]     FAX: [phone]         Patient Care Team:  Melia Álvarez MD as PCP - General (Family Practice)  Lula Edgar MD (Gastroenterology)      Problem List  Date Reviewed: 8/31/2017          Codes Class Noted    Chronic hepatitis C without hepatic coma [facility]) ICD-10-CM: B18.2  ICD-9-CM: 070.54  8/3/2015        Hydrocele, right ICD-10-CM: N43.3  ICD-9-CM: 603.9  8/3/2015        BPH (benign prostatic hypertrophy) ICD-10-CM: N40.0  ICD-9-CM: 600.00  10/15/2012        Hyperlipidemia ICD-10-CM: E78.5  ICD-9-CM: 272.4  9/30/2011        HTN (hypertension) ICD-10-CM: I10  ICD-9-CM: 401.9  9/30/2011        ED (erectile dysfunction) ICD-10-CM: N52.9  ICD-9-CM: 607.84  9/30/2011                Mary Hernandez returns to the [employer] today for education and management of chronic HCV. The patient is here for day one of the Salvage clinical trial.      The active problem list, all pertinent past medical history, medications, liver histology, endoscopic studies, radiologic findings and laboratory findings related to the liver disorder were reviewed with the patient. The patient is a 57 y.o. Black male who was noted to have abnormalities in liver chemistries and subsequently tested positive for chronic HCV in the  2000s. Risk factors for acquiring HCV are IV drug use, inhaling cocaine, tattoos, and mass vaccination after enlisting in the [facility] between 0219-3371. There was an episode of acute incteric hepatitis at the time of these risk factors. Ultrasound of the liver was performed in 4/2017. The results of the imaging demonstrated a normal appearing liver. An assessment of liver fibrosis with Fibrosure was 0.43 consistent with stage F2. The patient was treated with Jamal Alanis from 4-6/2017. The patient was treated for 8 weeks. The patient tolerated treatment reasonably well. HCV RNA levels obtained during treatment demonstrate a response followed by relapse. The most recent laboratory studies indicate that the liver transaminases are elevated, alkaline phosphatase is normal, tests of hepatic synthetic and metabolic function are normal, and the platelet count is normal.      The patient has no symptoms which can be attributed to the liver disorder. The patient completes all daily activities without any functional limitations. The patient has not experienced fatigue, fevers, chills, shortness of breath, chest pain, pain in the right side over the liver, diffuse abdominal pain, nausea, vomiting, constipation, diarrhrea, dry eyes, dry mouth, arthralgias, myalgias, yellowing of the eyes or skin, itching, dark urine, problems concentrating, swelling of the abdomen, swelling of the lower extremities, hematemesis, or hematochezia. ALLERGIES  Allergies   Allergen Reactions    Sean [Amlodipine-Olmesartan] Palpitations and Other (comments)     Fatigue, headache, pt states \"kidneys hurt\"       MEDICATIONS  Current Outpatient Prescriptions   Medication Sig    lisinopril (PRINIVIL, ZESTRIL) 40 mg tablet take 1 tablet by mouth once daily    triamcinolone acetonide (KENALOG) 0.1 % topical cream Apply  to affected area two (2) times a day. use thin layer    tadalafil (CIALIS) 10 mg tablet Take 1 Tab by mouth daily as needed.  hydroCHLOROthiazide (HYDRODIURIL) 25 mg tablet take 1 tablet by mouth once daily    amLODIPine (NORVASC) 5 mg tablet take 1 tablet by mouth once daily    aspirin 81 mg tablet Take 81 mg by mouth daily.       MULTIVITAMIN PO Take  by mouth daily. No current facility-administered medications for this visit. SYSTEM REVIEW NOT RELATED TO LIVER DISEASE OR REVIEWED ABOVE:  Constitution systems: Negative for fever, chills, weight gain, weight loss. Eyes: Negative for visual changes. ENT: Negative for sore throat, painful swallowing. Respiratory: Negative for cough, hemoptysis, SOB. Cardiology: Negative for chest pain, palpitations. GI:  Negative for constipation or diarrhea. : Negative for urinary frequency, dysuria, hematuria, nocturia. Skin: Negative for rash. Hematology: Negative for easy bruising, blood clots. Musculo-skelatal: Negative for back pain, muscle pain, weakness. Neurologic: Negative for headaches, dizziness, vertigo, memory problems not related to HE. Psychology: Negative for anxiety, depression. FAMILY HISTORY:  The father  of alcoholism. The mother has the following chronic diseases: dementia. There is no family history of liver disease. SOCIAL HISTORY:  The patient is . The spouse has been tested for HCV and is negative. The patient has 3 children, 1 stepchildren, 1 adopted and 12 grandchildren. The patient stopped using tobacco products in 1970s. The patient consumes 1 alcoholic beverages per day. The patient currently works full time [employer]. PHYSICAL EXAMINATION:  Visit Vitals    /88 (BP 1 Location: Left arm, BP Patient Position: Sitting)    Pulse 78    Temp 97.2 °F (36.2 °C) (Tympanic)    Resp 16    Ht 5' 7\" (1.702 m)    Wt 177 lb (80.3 kg)    SpO2 99%    BMI 27.72 kg/m2     General: No acute distress. Eyes: Sclera anicteric. ENT: No oral lesions. Thyroid normal.  Nodes: No adenopathy. Skin: No spider angiomata. No jaundice. No palmar erythema. Respiratory: Lungs clear to auscultation. Cardiovascular: Regular heart rate. No murmurs. No JVD. Abdomen: Soft non-tender.   Liver size normal to percussion/palpation. Spleen not palpable. No obvious ascites. Extremities: No edema. No muscle wasting. No gross arthritic changes. Neurologic: Alert and oriented. Cranial nerves grossly intact. No asterixis. LABORATORY STUDIES:  Liver [place] of [facility] & Units 4/27/2017 9/19/2016 2/29/2016   WBC 4.0 - 11.0 K/uL      ANC 1.8 - 7.7 K/uL      HGB 13.1 - 17.2 g/dL       - 440 K/uL      AST 10 - 37 U/L 47 (H) 36 42 (H)   ALT 5 - 40 U/L 69 (H) 45 (H) 60 (H)   Alk Phos 40 - 125 U/L 61 63 64   Bili, Total 0.2 - 1.2 mg/dL 0.4 0.4 0.3   Albumin 3.5 - 5.0 g/dL 4.3 4.2 4.4   BUN 6 - 22 mg/dL 17 15 12   Creat 0.8 - 1.6 mg/dL 1.0 1.1 1.1   Na 133 - 145 mmol/L 143 137 144   K 3.5 - 5.5 mmol/L 4.6 4.1 4.4   Cl 98 - 110 mmol/L 103 99 103   CO2 20 - 32 mmol/L 28 26 27   Glucose 65 - 99 mg/dL 105 (H) 103 (H) 102 (H)     SEROLOGIES:  1/2015. HCV RNA Log 6.5 IU/ml,   10/2016. HAV total positive, HBsAntigen negative, anti-HBcore negative, anti-HBsurface negative, HCV Geneotype 1A, anti-HIV negative  12/2016. HCV RNA undetectable  1/2017. HCV RNA undetectable  4/2017. HCV RNA Log 6.06 IU/ml,     LIVER HISTOLOGY:  3/2015. HCV Fibrosure 0.43. Consistent with stage 2 fibrosis. 4/2017. HCV Fibrosure 0.55. Consistent with stage 2 fibrosis. ENDOSCOPIC PROCEDURES:  3/2015. Colonoscopy by Dr Myesha Houser. No polyps    RADIOLOGY:  4/2017. Ultrasound of liver. Normal appearing liver. No liver mass lesions. OTHER TESTING:  Not available or performed    ASSESSMENT AND PLAN:  Chronic HCV with stage 2 fibrosis by Fibrosure. The most recent laboratory studies indicate that the liver transaminases are elevated, alkaline phosphatase is normal, tests of hepatic synthetic and metabolic function are normal, and the platelet count is normal.  Will perform laboratory testing to monitor liver function and degree of liver injury. This will include hepatic panel, a CBC w/ diff, a BMP, and HCV RNA.     The patient was previously treated for HCV with Orren Plaster. There was a response then relapse. The patient has HCV genotype 1A. The patient is enrolled DAA-Salvage  clinical trial for treatment experienced patients previously exposed to NS5A inhibitor + SOF. He is in the 12 week arm for non-cirrhotics. This is treatment week 4. The patient was directed to continue all current medications at the current dosages. There are no contraindications for the patient to take any medications that are necessary for treatment of other medical issues. The patient was counseled regarding alcohol consumption. Vaccination for viral hepatitis A is not required. The patient has serologic evidence of prior exposure or vaccination with immunity. Vaccination for viral hepatitis B is recommended. The patient does not have serologic evidence of prior exposure or vaccination with immunity. All of the above issues were discussed with the patient. All questions were answered. The patient expressed a clear understanding of the above. [address] [address] in 4 weeks per study protocol.      Gerhard Eye, NP   Liver [place] of [address] [place] [address], [address] [place] [address]   [phone]

## 2017-09-29 LAB
A-G RATIO,AGRAT: 1.5 RATIO (ref 1.1–2.6)
ABSOLUTE LYMPHOCYTE COUNT, 10803: 3.3 K/UL (ref 1–4.8)
ALBUMIN SERPL-MCNC: 4.6 G/DL (ref 3.5–5)
ALP SERPL-CCNC: 68 U/L (ref 40–125)
ALT SERPL-CCNC: 16 U/L (ref 5–40)
ANION GAP SERPL CALC-SCNC: 29 MMOL/L
AST SERPL W P-5'-P-CCNC: 23 U/L (ref 10–37)
BASOPHILS # BLD: 0 K/UL (ref 0–0.2)
BASOPHILS NFR BLD: 0 % (ref 0–2)
BILIRUB SERPL-MCNC: 0.2 MG/DL (ref 0.2–1.2)
BILIRUBIN, DIRECT,CBIL: <0.2 MG/DL (ref 0–0.3)
BUN SERPL-MCNC: 19 MG/DL (ref 6–22)
CALCIUM SERPL-MCNC: 9.6 MG/DL (ref 8.4–10.4)
CHLORIDE SERPL-SCNC: 95 MMOL/L (ref 98–110)
CO2 SERPL-SCNC: 17 MMOL/L (ref 20–32)
CREAT SERPL-MCNC: 1.3 MG/DL (ref 0.8–1.6)
EOSINOPHIL # BLD: 0.2 K/UL (ref 0–0.5)
EOSINOPHIL NFR BLD: 2 % (ref 0–6)
ERYTHROCYTE [DISTWIDTH] IN BLOOD BY AUTOMATED COUNT: 12.6 % (ref 10–16)
GFRAA, 66117: >60
GFRNA, 66118: 53.7
GLOBULIN,GLOB: 3.1 G/DL (ref 2–4)
GLUCOSE SERPL-MCNC: 78 MG/DL (ref 65–99)
GRANULOCYTES,GRANS: 43 % (ref 40–75)
HCT VFR BLD AUTO: 45 % (ref 39.3–51.6)
HCV PCR LOG10, 20021: <1.18 {LOG_IU}/ML
HEPATITIS C RNA-PCR, 550401: <15 IU/ML
HGB BLD-MCNC: 15.2 G/DL (ref 13.1–17.2)
LYMPHOCYTES, LYMLT: 42 % (ref 27–45)
MCH RBC QN AUTO: 31 PG (ref 26–34)
MCHC RBC AUTO-ENTMCNC: 34 G/DL (ref 32–36)
MCV RBC AUTO: 92 FL (ref 80–95)
MONOCYTES # BLD: 0.9 K/UL (ref 0.1–0.9)
MONOCYTES NFR BLD: 12 % (ref 3–9)
NEUTROPHILS # BLD AUTO: 3.3 K/UL (ref 1.8–7.7)
PLATELET # BLD AUTO: 277 K/UL (ref 140–440)
PMV BLD AUTO: 11.2 FL (ref 6–10.8)
POTASSIUM SERPL-SCNC: 4 MMOL/L (ref 3.5–5.5)
PROT SERPL-MCNC: 7.7 G/DL (ref 6.2–8.1)
RBC # BLD AUTO: 4.87 M/UL (ref 3.8–5.8)
SODIUM SERPL-SCNC: 141 MMOL/L (ref 133–145)
WBC # BLD AUTO: 7.7 K/UL (ref 4–11)

## 2017-10-17 DIAGNOSIS — I10 ESSENTIAL HYPERTENSION: ICD-10-CM

## 2017-10-17 RX ORDER — HYDROCHLOROTHIAZIDE 25 MG/1
TABLET ORAL
Qty: 90 TAB | Refills: 3 | Status: SHIPPED | OUTPATIENT
Start: 2017-10-17 | End: 2018-10-12 | Stop reason: SDUPTHER

## 2017-10-17 RX ORDER — LISINOPRIL 40 MG/1
TABLET ORAL
Qty: 90 TAB | Refills: 1 | Status: SHIPPED | OUTPATIENT
Start: 2017-10-17 | End: 2018-04-15 | Stop reason: SDUPTHER

## 2017-10-20 ENCOUNTER — HOSPITAL ENCOUNTER (OUTPATIENT)
Dept: LAB | Age: 62
Discharge: HOME | End: 2017-10-20

## 2017-10-20 LAB — SENTARA SPECIMEN COL,SENBCF: NORMAL

## 2017-10-20 PROCEDURE — 99001 SPECIMEN HANDLING PT-LAB: CPT | Performed by: FAMILY MEDICINE

## 2017-10-21 LAB
A-G RATIO,AGRAT: 1.5 RATIO (ref 1.1–2.6)
ALBUMIN SERPL-MCNC: 4.6 G/DL (ref 3.5–5)
ALP SERPL-CCNC: 77 U/L (ref 40–125)
ALT SERPL-CCNC: 18 U/L (ref 5–40)
ANION GAP SERPL CALC-SCNC: 19 MMOL/L
AST SERPL W P-5'-P-CCNC: 28 U/L (ref 10–37)
AVG GLU, 10930: 119 MG/DL (ref 91–123)
BILIRUB SERPL-MCNC: 0.3 MG/DL (ref 0.2–1.2)
BUN SERPL-MCNC: 17 MG/DL (ref 6–22)
CALCIUM SERPL-MCNC: 9.8 MG/DL (ref 8.4–10.4)
CHLORIDE SERPL-SCNC: 95 MMOL/L (ref 98–110)
CHOLEST SERPL-MCNC: 208 MG/DL (ref 110–200)
CO2 SERPL-SCNC: 24 MMOL/L (ref 20–32)
CREAT SERPL-MCNC: 1.2 MG/DL (ref 0.8–1.6)
GFRAA, 66117: >60
GFRNA, 66118: 58.7
GLOBULIN,GLOB: 3 G/DL (ref 2–4)
GLUCOSE SERPL-MCNC: 92 MG/DL (ref 70–99)
HBA1C MFR BLD HPLC: 5.8 % (ref 4.8–5.9)
HDLC SERPL-MCNC: 44 MG/DL (ref 40–59)
LDLC SERPL CALC-MCNC: 149 MG/DL (ref 50–99)
POTASSIUM SERPL-SCNC: 4.5 MMOL/L (ref 3.5–5.5)
PROT SERPL-MCNC: 7.6 G/DL (ref 6.2–8.1)
SODIUM SERPL-SCNC: 138 MMOL/L (ref 133–145)
TRIGL SERPL-MCNC: 74 MG/DL (ref 40–149)
VLDLC SERPL CALC-MCNC: 15 MG/DL (ref 8–30)

## 2017-10-26 ENCOUNTER — OFFICE VISIT (OUTPATIENT)
Dept: HEMATOLOGY | Age: 62
End: 2017-10-26

## 2017-10-26 ENCOUNTER — HOSPITAL ENCOUNTER (OUTPATIENT)
Dept: LAB | Age: 62
Discharge: HOME | End: 2017-10-26

## 2017-10-26 VITALS
DIASTOLIC BLOOD PRESSURE: 91 MMHG | OXYGEN SATURATION: 98 % | HEIGHT: 67 IN | SYSTOLIC BLOOD PRESSURE: 180 MMHG | TEMPERATURE: 96.4 F | HEART RATE: 68 BPM | WEIGHT: 177 LBS | BODY MASS INDEX: 27.78 KG/M2 | RESPIRATION RATE: 16 BRPM

## 2017-10-26 DIAGNOSIS — B18.2 CHRONIC HEPATITIS C WITHOUT HEPATIC COMA (HCC): Primary | ICD-10-CM

## 2017-10-26 PROCEDURE — 99000 SPECIMEN HANDLING OFFICE-LAB: CPT | Performed by: INTERNAL MEDICINE

## 2017-10-26 NOTE — PROGRESS NOTES
[address] Sampson MD, [address], [place] [facility], [place]       ALAN Peguero PA-C Bennet Colace, [facility]   ALAN Ortiz NP        at [facility]     [address] [facility], [address] [address]     [phone]     FAX: [phone]    at [address], [address] [facility], [address]     [phone]     FAX: [phone]         Patient Care Team:  Marcia Alexander MD as PCP - General (Family Practice)  Ayaan Rosenberg MD (Gastroenterology)      Problem List  Date Reviewed: 8/31/2017          Codes Class Noted    Chronic hepatitis C without hepatic coma [facility]) ICD-10-CM: B18.2  ICD-9-CM: 070.54  8/3/2015        Hydrocele, right ICD-10-CM: N43.3  ICD-9-CM: 603.9  8/3/2015        BPH (benign prostatic hypertrophy) ICD-10-CM: N40.0  ICD-9-CM: 600.00  10/15/2012        Hyperlipidemia ICD-10-CM: E78.5  ICD-9-CM: 272.4  9/30/2011        HTN (hypertension) ICD-10-CM: I10  ICD-9-CM: 401.9  9/30/2011        ED (erectile dysfunction) ICD-10-CM: N52.9  ICD-9-CM: 607.84  9/30/2011                Patel Crystal returns to the [place] [address] today for education and management of chronic HCV. The patient is here for week 8 of the Salvage clinical trial.      The active problem list, all pertinent past medical history, medications, liver histology, endoscopic studies, radiologic findings and laboratory findings related to the liver disorder were reviewed with the patient. The patient is a 57 y.o. Black male who was noted to have abnormalities in liver chemistries and subsequently tested positive for chronic HCV in the  2000s. Risk factors for acquiring HCV are IV drug use, inhaling cocaine, tattoos, and mass vaccination after enlisting in the [employer] all between 1863-8058. There was an episode of acute incteric hepatitis at the time of these risk factors. Ultrasound of the liver was performed in 4/2017. The results of the imaging demonstrated a normal appearing liver. An assessment of liver fibrosis with Fibrosure was 0.43 consistent with stage F2. The patient was treated with Lia Dejean carlos from 4-6/2017. The patient was treated for 8 weeks. The patient tolerated treatment reasonably well. HCV RNA levels obtained during treatment demonstrate a response followed by relapse. The most recent laboratory studies indicate that the liver transaminases are normal, alkaline phosphatase is normal, tests of hepatic synthetic and metabolic function are normal, and the platelet count is normal.      The patient has no symptoms which can be attributed to the liver disorder. The patient completes all daily activities without any functional limitations. The patient has not experienced fatigue, fevers, chills, shortness of breath, chest pain, pain in the right side over the liver, diffuse abdominal pain, nausea, vomiting, constipation, diarrhrea, dry eyes, dry mouth, arthralgias, myalgias, yellowing of the eyes or skin, itching, dark urine, problems concentrating, swelling of the abdomen, swelling of the lower extremities, hematemesis, or hematochezia. ALLERGIES  Allergies   Allergen Reactions    Sean [Amlodipine-Olmesartan] Palpitations and Other (comments)     Fatigue, headache, pt states \"kidneys hurt\"       MEDICATIONS  Current Outpatient Prescriptions   Medication Sig    lisinopril (PRINIVIL, ZESTRIL) 40 mg tablet take 1 tablet by mouth once daily    hydroCHLOROthiazide (HYDRODIURIL) 25 mg tablet take 1 tablet by mouth once daily    triamcinolone acetonide (KENALOG) 0.1 % topical cream Apply  to affected area two (2) times a day. use thin layer    tadalafil (CIALIS) 10 mg tablet Take 1 Tab by mouth daily as needed.  amLODIPine (NORVASC) 5 mg tablet take 1 tablet by mouth once daily    aspirin 81 mg tablet Take 81 mg by mouth daily.       MULTIVITAMIN PO Take  by mouth daily. No current facility-administered medications for this visit. SYSTEM REVIEW NOT RELATED TO LIVER DISEASE OR REVIEWED ABOVE:  Constitution systems: Negative for fever, chills, weight gain, weight loss. Eyes: Negative for visual changes. ENT: Negative for sore throat, painful swallowing. Respiratory: Negative for cough, hemoptysis, SOB. Cardiology: Negative for chest pain, palpitations. GI:  Negative for constipation or diarrhea. : Negative for urinary frequency, dysuria, hematuria, nocturia. Skin: Negative for rash. Hematology: Negative for easy bruising, blood clots. Musculo-skelatal: Negative for back pain, muscle pain, weakness. Neurologic: Negative for headaches, dizziness, vertigo, memory problems not related to HE. Psychology: Negative for anxiety, depression. FAMILY HISTORY:  The father  of alcoholism. The mother has the following chronic diseases: dementia. There is no family history of liver disease. SOCIAL HISTORY:  The patient is . The spouse has been tested for HCV and is negative. The patient has 3 children, 1 stepchildren, 1 adopted and 12 grandchildren. The patient stopped using tobacco products in 1970s. The patient consumes 1 alcoholic beverages per day. The patient currently works full time [employer]. PHYSICAL EXAMINATION:  Visit Vitals    BP (!) 180/91 (BP 1 Location: Left arm, BP Patient Position: Sitting)    Pulse 68    Temp 96.4 °F (35.8 °C) (Tympanic)    Resp 16    Ht 5' 7\" (1.702 m)    Wt 177 lb (80.3 kg)    SpO2 98%    BMI 27.72 kg/m2     General: No acute distress. Eyes: Sclera anicteric. ENT: No oral lesions. Thyroid normal.  Nodes: No adenopathy. Skin: No spider angiomata. No jaundice. No palmar erythema. Respiratory: Lungs clear to auscultation. Cardiovascular: Regular heart rate. No murmurs. No JVD. Abdomen: Soft non-tender.   Liver size normal to percussion/palpation. Spleen not palpable. No obvious ascites. Extremities: No edema. No muscle wasting. No gross arthritic changes. Neurologic: Alert and oriented. Cranial nerves grossly intact. No asterixis. LABORATORY STUDIES:  Liver [place] of [address] [place] [address] & Units 10/20/2017 9/28/2017   WBC 4.0 - 11.0 K/uL  7.7   ANC 1.8 - 7.7 K/uL  3.3   HGB 13.1 - 17.2 g/dL  15.2    - 440 K/uL  277   AST 10 - 37 U/L 28 23   ALT 5 - 40 U/L 18 16   Alk Phos 40 - 125 U/L 77 68   Bili, Total 0.2 - 1.2 mg/dL 0.3 0.2   Bili, Direct 0.0 - 0.3 mg/dL  <0.2   Albumin 3.5 - 5.0 g/dL 4.6 4.6   BUN 6 - 22 mg/dL 17 19   Creat 0.8 - 1.6 mg/dL 1.2 1.3   Na 133 - 145 mmol/L 138 141   K 3.5 - 5.5 mmol/L 4.5 4.0   Cl 98 - 110 mmol/L 95 (L) 95 (L)   CO2 20 - 32 mmol/L 24 17 (L)   Glucose 70 - 99 mg/dL 92 78       SEROLOGIES:  1/2015. HCV RNA Log 6.5 IU/ml,   10/2016. HAV total positive, HBsAntigen negative, anti-HBcore negative, anti-HBsurface negative, HCV Geneotype 1A, anti-HIV negative  12/2016. HCV RNA undetectable  1/2017. HCV RNA undetectable  4/2017. HCV RNA Log 6.06 IU/ml,     LIVER HISTOLOGY:  3/2015. HCV Fibrosure 0.43. Consistent with stage 2 fibrosis. 4/2017. HCV Fibrosure 0.55. Consistent with stage 2 fibrosis. ENDOSCOPIC PROCEDURES:  3/2015. Colonoscopy by Dr Te Curry. No polyps    RADIOLOGY:  4/2017. Ultrasound of liver. Normal appearing liver. No liver mass lesions. OTHER TESTING:  Not available or performed    ASSESSMENT AND PLAN:  Chronic HCV with stage 2 fibrosis by Fibrosure. The most recent laboratory studies indicate that the liver transaminases are normal, alkaline phosphatase is normal, tests of hepatic synthetic and metabolic function are normal, and the platelet count is normal. Labs per study protocol. No labs ordered through this practice. The patient was previously treated for HCV with Wendy Boland. There was a response then relapse.       The patient has HCV genotype 1A.  The patient is enrolled DAA-Salvage  clinical trial for treatment experienced patients previously exposed to NS5A inhibitor + SOF. He is in the 12 week arm for non-cirrhotics. This is treatment week 8. The patient was directed to continue all current medications at the current dosages. There are no contraindications for the patient to take any medications that are necessary for treatment of other medical issues. The patient was counseled regarding alcohol consumption. Vaccination for viral hepatitis A is not required. The patient has serologic evidence of prior exposure or vaccination with immunity. Vaccination for viral hepatitis B is recommended. The patient does not have serologic evidence of prior exposure or vaccination with immunity. All of the above issues were discussed with the patient. All questions were answered. The patient expressed a clear understanding of the above. [facility] [address] in 4 weeks per study protocol.      Kamila Munoz NP   Liver [place] of [address] [place] [address], [address] [facility], [address] [place] [address]   [phone]

## 2017-10-26 NOTE — MR AVS SNAPSHOT
Visit Information Date & Time Provider Department Dept. Phone Encounter #  
 10/26/2017  8:30 AM ALAN [address] [id number] Follow-up Instructions Return in about 4 weeks (around 11/23/2017). Your Appointments 10/26/2017  8:30 AM  
Follow Up with Radha Kerr NP [address] ([address]) Appt Note: DAA Salvage Week 8 f/up; NON-Billable Visit/LYB  
 [address], [place] [address] 322 [facility]  
  
   
 1100 [facility] [address] [place] [address]  
  
    
 11/3/2017  1:15 PM  
Follow Up with Kaleb Kennedy MD  
[facility] (--) Appt Note: follow up [address] [place] [address] [address]  
  
    
 11/20/2017  8:30 AM  
Follow Up with Radha Kerr NP [address] ([address]) Appt Note: DAA Salvage Week 12  
 [address], [place] [address] [place] [address]  
[phone] Upcoming Health Maintenance Date Due INFLUENZA AGE 9 TO ADULT 8/1/2017 COLONOSCOPY 3/12/2020 DTaP/Tdap/Td series (2 - Td) 10/2/2025 Allergies as of 10/26/2017  Review Complete On: 10/26/2017 By: Keturah Cordero Severity Noted Reaction Type Reactions Sean [Amlodipine-olmesartan] High 08/15/2012    Palpitations, Other (comments) Fatigue, headache, pt states \"kidneys hurt\" Current Immunizations  Reviewed on 1/27/2017 Name Date Influenza Vaccine (Quad) PF 9/29/2016 Tdap 10/2/2015 Not reviewed this visit Vitals BP Pulse Temp Resp Height(growth percentile) (!) 180/91 (BP 1 Location: Left arm, BP Patient Position: Sitting) 68 96.4 °F (35.8 °C) (Tympanic) 16 5' 7\" (1.702 m) Weight(growth percentile) SpO2 BMI Smoking Status 177 lb (80.3 kg) 98% 27.72 kg/m2 Former Smoker BMI and BSA Data Body Mass Index Body Surface Area  
 27.72 kg/m 2 1.95 m 2 Preferred Pharmacy Pharmacy Name Phone [facility] [address], [address] [phone] Your Updated Medication List  
  
   
This list is accurate as of: 10/26/17  8:20 AM.  Always use your most recent med list. amLODIPine 5 mg tablet Commonly known as:  NORVASC  
take 1 tablet by mouth once daily  
  
 aspirin 81 mg tablet Take 81 mg by mouth daily. hydroCHLOROthiazide 25 mg tablet Commonly known as:  HYDRODIURIL  
take 1 tablet by mouth once daily  
  
 lisinopril 40 mg tablet Commonly known as:  PRINIVIL, ZESTRIL  
take 1 tablet by mouth once daily MULTIVITAMIN PO Take  by mouth daily. tadalafil 10 mg tablet Commonly known as:  CIALIS Take 1 Tab by mouth daily as needed. triamcinolone acetonide 0.1 % topical cream  
Commonly known as:  KENALOG Apply  to affected area two (2) times a day. use thin layer Follow-up Instructions Return in about 4 weeks (around 11/23/2017). Introducing \[facility]\"" & HEALTH SERVICES! Dear Hannah Dupree: Thank you for requesting a MobilePro account. Our records indicate that you have previously registered for a MobilePro account but its currently inactive. Please call our MobilePro support line at [phone]. Additional Information If you have questions, please visit the Frequently Asked Questions section of the MobilePro website at [email or web address] Remember, MobilePro is NOT to be used for urgent needs. For medical emergencies, dial 911. Now available from your iPhone and Android! Please provide this summary of care documentation to your next provider. Your primary care clinician is listed as AUGUSTINE FERRARI. If you have any questions after today's visit, please call [phone].

## 2017-11-03 ENCOUNTER — OFFICE VISIT (OUTPATIENT)
Dept: FAMILY MEDICINE CLINIC | Age: 62
End: 2017-11-03

## 2017-11-03 VITALS
RESPIRATION RATE: 16 BRPM | HEIGHT: 67 IN | DIASTOLIC BLOOD PRESSURE: 80 MMHG | HEART RATE: 94 BPM | TEMPERATURE: 97.1 F | WEIGHT: 175.5 LBS | BODY MASS INDEX: 27.55 KG/M2 | SYSTOLIC BLOOD PRESSURE: 160 MMHG

## 2017-11-03 DIAGNOSIS — Z23 ENCOUNTER FOR IMMUNIZATION: ICD-10-CM

## 2017-11-03 DIAGNOSIS — B18.2 CHRONIC HEPATITIS C WITHOUT HEPATIC COMA (HCC): Primary | ICD-10-CM

## 2017-11-03 DIAGNOSIS — Q80.9 ICHTHYOSIS: ICD-10-CM

## 2017-11-03 DIAGNOSIS — I10 ESSENTIAL HYPERTENSION: ICD-10-CM

## 2017-11-03 DIAGNOSIS — E78.5 HYPERLIPIDEMIA, UNSPECIFIED HYPERLIPIDEMIA TYPE: ICD-10-CM

## 2017-11-03 RX ORDER — UREA 40 %
CREAM (GRAM) TOPICAL 2 TIMES DAILY
Qty: 85 G | Refills: 0 | Status: SHIPPED | OUTPATIENT
Start: 2017-11-03 | End: 2022-10-04

## 2017-11-03 RX ORDER — AMLODIPINE BESYLATE 5 MG/1
5 TABLET ORAL 2 TIMES DAILY
Qty: 180 TAB | Refills: 3 | Status: SHIPPED | OUTPATIENT
Start: 2017-11-03 | End: 2018-01-22

## 2017-11-03 NOTE — PROGRESS NOTES
HISTORY OF PRESENT ILLNESS  Rowdy Leon is a 57 y.o. male. Chief Complaint   Patient presents with    Hepatitis C chronic problem, stable seems to be improving on current new drug received in study with DR. Orosco. Was on [employer] previously but did not work as insurance would only cover 8 weeks of therapy    Cholesterol Problem     high chol    Hypertension Chronic problem, uncontrolled states blood pressure has been more elevated per pt checking at home up to 170, states med makes him tired and wonders if causing blood pressure elevation as well. When blood pressure was elevated felt some dizzy at times.  Blood sugar problem     Prediabetes chronic problem, stable no meds on diet for this      Results     discuss lab results    Immunization/Injection     flu vaccine       HPI  Past Medical History:   Diagnosis Date    BPH (benign prostatic hypertrophy) 10/15/2012    ED (erectile dysfunction) 9/30/2011    Hepatitis C     HTN (hypertension) 9/30/2011    Hyperlipidemia 9/30/2011     Current Outpatient Prescriptions   Medication Sig Dispense Refill    glecaprevir-pibrentasvir 100-40 mg tab Take  by mouth. Take 3 daily      lisinopril (PRINIVIL, ZESTRIL) 40 mg tablet take 1 tablet by mouth once daily 90 Tab 1    hydroCHLOROthiazide (HYDRODIURIL) 25 mg tablet take 1 tablet by mouth once daily 90 Tab 3    triamcinolone acetonide (KENALOG) 0.1 % topical cream Apply  to affected area two (2) times a day. use thin layer 454 g 0    tadalafil (CIALIS) 10 mg tablet Take 1 Tab by mouth daily as needed. 4 Tab 3    amLODIPine (NORVASC) 5 mg tablet take 1 tablet by mouth once daily 90 Tab 3    aspirin 81 mg tablet Take 81 mg by mouth daily.  MULTIVITAMIN PO Take  by mouth daily. Allergies   Allergen Reactions    Sean [Amlodipine-Olmesartan] Palpitations and Other (comments)     Fatigue, headache, pt states \"kidneys hurt\"       ROS Respiratory: Negative for shortness of breath. Cardiovascular: Negative for chest pain. Genitourinary: Negative for frequency. Neurological: Negative for dizziness and headaches. Visit Vitals    /80 (BP 1 Location: Right arm, BP Patient Position: Sitting)  Comment: manual    Pulse 94    Temp 97.1 °F (36.2 °C) (Oral)    Resp 16    Ht 5' 7\" (1.702 m)    Wt 175 lb 8 oz (79.6 kg)    BMI 27.49 kg/m2       Physical Exam  Nursing note and vitals reviewed. Constitutional: He is oriented to person, place, and time. He appears well-developed and well-nourished. No distress. HENT:   Mouth/Throat: Oropharynx is clear and moist.   Neck: No JVD present. No thyromegaly present. Cardiovascular: Normal rate, regular rhythm and normal heart sounds. Pulmonary/Chest: Effort normal and breath sounds normal. No respiratory distress. He has no wheezes. He has no rales. Abdominal: Soft. Bowel sounds are normal. He exhibits no distension. There is no tenderness. There is no rebound. Musculoskeletal: He exhibits no edema and no tenderness. Lymphadenopathy:     He has no cervical adenopathy. Neurological: He is alert and oriented to person, place, and time. Skin: No pallor. Psychiatric: He has a normal mood and affect. His behavior is normal.   Component      Latest Ref Rng & Units 10/20/2017   Glucose      70 - 99 mg/dL 92   BUN      6 - 22 mg/dL 17   Creatinine      0.8 - 1.6 mg/dL 1.2   Sodium      133 - 145 mmol/L 138   Potassium      3.5 - 5.5 mmol/L 4.5   Chloride      98 - 110 mmol/L 95 (L)   CO2      20 - 32 mmol/L 24   AST      10 - 37 U/L 28   ALT (SGPT)      5 - 40 U/L 18   Alk.  phosphatase      40 - 125 U/L 77   Bilirubin, total      0.2 - 1.2 mg/dL 0.3   Calcium      8.4 - 10.4 mg/dL 9.8   Protein, total      6.2 - 8.1 g/dL 7.6   Albumin      3.5 - 5.0 g/dL 4.6   A-G Ratio      1.1 - 2.6 ratio 1.5   Globulin      2.0 - 4.0 g/dL 3.0   Anion gap      mmol/L 19.0   GFRAA      >60.0 >60.0   GFRNA      >60.0 58.7 (L)   Triglyceride      40 - 149 mg/dL 74   HDL Cholesterol      40 - 59 mg/dL 44   Cholesterol, total      110 - 200 mg/dL 208 (H)   LDL, calculated      50 - 99 mg/dL 149 (H)   VLDL, calculated      8 - 30 mg/dL 15   Hemoglobin A1c, (calculated)      4.8 - 5.9 % 5.8   AVG GLU      91 - 123 mg/dL 119     ASSESSMENT and PLAN    ICD-10-CM ICD-9-CM    1. Chronic hepatitis C without hepatic coma (HCC) B18.2 070.54 Per gi   2. Hyperlipidemia, unspecified hyperlipidemia type uncontrolled work on diet , refusing med E78.5 272.4    3. Essential hypertension uncontrolled  I10 401.9 amLODIPine (NORVASC) 5 mg tablet   4. Ichthyosis Q80.9 757.1 urea (CARMOL) 40 % topical cream   5. Encounter for immunization Z23 V03.89 INFLUENZA VIRUS VAC QUAD,SPLIT,PRESV FREE SYRINGE IM   Follow-up Disposition:  Return in about 6 weeks (around 12/15/2017).

## 2017-11-03 NOTE — PROGRESS NOTES
Chief Complaint   Patient presents with    Hepatitis C    Cholesterol Problem     high chol    Hypertension    Blood sugar problem     prediabetes    Results     discuss lab results       Health Maintenance Due   Topic Date Due    INFLUENZA AGE 9 TO ADULT  08/01/2017       Health Maintenance reviewed     1. Have you been to the ER, urgent care clinic since your last visit? Hospitalized since your last visit? No    2. Have you seen or consulted any other health care providers outside of the [address] [place] [address] since your last visit? Include any pap smears or colon screening.  No

## 2017-11-03 NOTE — MR AVS SNAPSHOT
Visit Information Date & Time Provider Department Dept. Phone Encounter #  
 11/3/2017  1:15 PM Andreia Latif MD [facility] [phone] Follow-up Instructions Return in about 6 weeks (around 12/15/2017). Your Appointments 11/20/2017  8:30 AM  
Follow [place] [address] [facility] ([address]) Appt Note: DAA Salvage Week 12  
 [address], [place] [address] [place] 322 [facility]  
  
   
 [address], [facility] Upcoming Health Maintenance Date Due INFLUENZA AGE 9 TO ADULT 8/1/2017 COLONOSCOPY 3/12/2020 DTaP/Tdap/Td series (2 - Td) 10/2/2025 Allergies as of 11/3/2017  Review Complete On: 11/3/2017 By: Andreia Latif MD  
  
 Severity Noted Reaction Type Reactions Sean [Amlodipine-olmesartan] High 08/15/2012    Palpitations, Other (comments) Fatigue, headache, pt states \"kidneys hurt\" Current Immunizations  Reviewed on 1/27/2017 Name Date Influenza Vaccine (Quad) PF 9/29/2016 Tdap 10/2/2015 Not reviewed this visit You Were Diagnosed With   
  
 Codes Comments Chronic hepatitis C without hepatic coma (HCC)    -  Primary ICD-10-CM: B18.2 ICD-9-CM: 070.54 Hyperlipidemia, unspecified hyperlipidemia type     ICD-10-CM: E78.5 ICD-9-CM: 272.4 Essential hypertension     ICD-10-CM: I10 
ICD-9-CM: 401.9 Ichthyosis     ICD-10-CM: Q80.9 ICD-9-CM: 757.1 Vitals BP Pulse Temp Resp Height(growth percentile) Weight(growth percentile) 160/80 (BP 1 Location: Right arm, BP Patient Position: Sitting) 94 97.1 °F (36.2 °C) (Oral) 16 5' 7\" (1.702 m) 175 lb 8 oz (79.6 kg) BMI Smoking Status 27.49 kg/m2 Former Smoker Vitals History BMI and BSA Data Body Mass Index Body Surface Area  
 27.49 kg/m 2 1.94 m 2 Preferred Pharmacy Pharmacy Name Phone [facility] [address] [phone] Your Updated Medication List  
  
   
This list is accurate as of: 11/3/17  2:06 PM.  Always use your most recent med list. amLODIPine 5 mg tablet Commonly known as:  Matthias Chafe Take 1 Tab by mouth two (2) times a day. take 1 tablet by mouth once daily  
  
 aspirin 81 mg tablet Take 81 mg by mouth daily. glecaprevir-pibrentasvir 100-40 mg Tab Take  by mouth. Take 3 daily  
  
 hydroCHLOROthiazide 25 mg tablet Commonly known as:  HYDRODIURIL  
take 1 tablet by mouth once daily  
  
 lisinopril 40 mg tablet Commonly known as:  PRINIVIL, ZESTRIL  
take 1 tablet by mouth once daily MULTIVITAMIN PO Take  by mouth daily. tadalafil 10 mg tablet Commonly known as:  CIALIS Take 1 Tab by mouth daily as needed. triamcinolone acetonide 0.1 % topical cream  
Commonly known as:  KENALOG Apply  to affected area two (2) times a day. use thin layer  
  
 urea 40 % topical cream  
Commonly known as:  CARMOL Apply  to affected area two (2) times a day. Prescriptions Sent to Pharmacy Refills  
 amLODIPine (NORVASC) 5 mg tablet 3 Sig: Take 1 Tab by mouth two (2) times a day. take 1 tablet by mouth once daily Class: Normal  
 Pharmacy: [id number] Ph #: [phone] Route: Oral  
 urea (CARMOL) 40 % topical cream 0 Sig: Apply  to affected area two (2) times a day. Class: Normal  
 Pharmacy: [id number] Ph #: [phone] Route: Topical  
  
Follow-up Instructions Return in about 6 weeks (around 12/15/2017). Patient Instructions Please contact our office if you have any questions about your visit today. Introducing [facility] & HEALTH SERVICES! Dear Serafin Chance: Thank you for requesting a Creative Brain Studiost account.   Our records indicate that you have previously registered for a [email or web address] account but its currently inactive. Please call our [email or web address] support line at [phone]. Additional Information If you have questions, please visit the Frequently Asked Questions section of the [email or web address] website at [email or web address] Remember, [email or web address] is NOT to be used for urgent needs. For medical emergencies, dial 911. Now available from your iPhone and Android! Please provide this summary of care documentation to your next provider. Your primary care clinician is listed as AUGUTSINE FERRARI. If you have any questions after today's visit, please call [phone].

## 2017-11-03 NOTE — PROGRESS NOTES
Leia Reno is a 57 y.o. male who presents for routine immunizations. He denies any symptoms , reactions or allergies that would exclude them from being immunized today. Risks and adverse reactions were discussed and the VIS was given to them. All questions were addressed. He was observed for 15 min post injection. There were no reactions observed.     Genia Jiménez LPN

## 2017-11-04 DIAGNOSIS — R73.03 PREDIABETES: ICD-10-CM

## 2017-11-04 DIAGNOSIS — Z51.81 MEDICATION MONITORING ENCOUNTER: ICD-10-CM

## 2017-11-04 DIAGNOSIS — E78.5 HYPERLIPIDEMIA, UNSPECIFIED HYPERLIPIDEMIA TYPE: ICD-10-CM

## 2017-11-04 DIAGNOSIS — I10 ESSENTIAL HYPERTENSION: ICD-10-CM

## 2017-11-20 ENCOUNTER — HOSPITAL ENCOUNTER (OUTPATIENT)
Dept: LAB | Age: 62
Discharge: HOME | End: 2017-11-20

## 2017-11-20 ENCOUNTER — OFFICE VISIT (OUTPATIENT)
Dept: HEMATOLOGY | Age: 62
End: 2017-11-20

## 2017-11-20 VITALS
DIASTOLIC BLOOD PRESSURE: 89 MMHG | RESPIRATION RATE: 16 BRPM | TEMPERATURE: 96.3 F | HEART RATE: 70 BPM | SYSTOLIC BLOOD PRESSURE: 154 MMHG | WEIGHT: 171 LBS | BODY MASS INDEX: 26.84 KG/M2 | HEIGHT: 67 IN | OXYGEN SATURATION: 98 %

## 2017-11-20 DIAGNOSIS — B18.2 CHRONIC HEPATITIS C WITHOUT HEPATIC COMA (HCC): Primary | ICD-10-CM

## 2017-11-20 DIAGNOSIS — B18.2 CHRONIC HEPATITIS C WITHOUT HEPATIC COMA (HCC): ICD-10-CM

## 2017-11-20 LAB
ALBUMIN SERPL-MCNC: 3.9 G/DL (ref 3.4–5)
ALBUMIN/GLOB SERPL: 0.9 {RATIO} (ref 0.8–1.7)
ALP SERPL-CCNC: 83 U/L (ref 45–117)
ALT SERPL-CCNC: 27 U/L (ref 16–61)
AST SERPL-CCNC: 25 U/L (ref 15–37)
BILIRUB DIRECT SERPL-MCNC: 0.1 MG/DL (ref 0–0.2)
BILIRUB SERPL-MCNC: 0.4 MG/DL (ref 0.2–1)
GLOBULIN SER CALC-MCNC: 4.4 G/DL (ref 2–4)
PROT SERPL-MCNC: 8.3 G/DL (ref 6.4–8.2)

## 2017-11-20 PROCEDURE — 80076 HEPATIC FUNCTION PANEL: CPT | Performed by: NURSE PRACTITIONER

## 2017-11-20 PROCEDURE — 36415 COLL VENOUS BLD VENIPUNCTURE: CPT | Performed by: NURSE PRACTITIONER

## 2017-11-20 PROCEDURE — 87522 HEPATITIS C REVRS TRNSCRPJ: CPT | Performed by: NURSE PRACTITIONER

## 2017-11-20 PROCEDURE — 99000 SPECIMEN HANDLING OFFICE-LAB: CPT | Performed by: NURSE PRACTITIONER

## 2017-11-20 NOTE — PROGRESS NOTES
[address] Lar[address] Sampson MD, [address], [place], [place]       Maryellen Martin, MEG [place], PRISCILLA-BC   ALAN Schofield NP        at [address], [address] [address]     [phone]     FAX: [phone]    at [address], [address] [facility], [address]     [phone]     FAX: [phone]         Patient Care Team:  Trisha Call MD as PCP - General (Family Practice)  Man Mckeon MD (Gastroenterology)      Problem List  Date Reviewed: 11/3/2017          Codes Class Noted    Chronic hepatitis C without hepatic coma [facility]) ICD-10-CM: B18.2  ICD-9-CM: 070.54  8/3/2015        Hydrocele, right ICD-10-CM: N43.3  ICD-9-CM: 603.9  8/3/2015        BPH (benign prostatic hypertrophy) ICD-10-CM: N40.0  ICD-9-CM: 600.00  10/15/2012        Hyperlipidemia ICD-10-CM: E78.5  ICD-9-CM: 272.4  9/30/2011        HTN (hypertension) ICD-10-CM: I10  ICD-9-CM: 401.9  9/30/2011        ED (erectile dysfunction) ICD-10-CM: N52.9  ICD-9-CM: 607.84  9/30/2011                Danielito Viera returns to the [place] [address] today for education and management of chronic HCV. The patient is here for week 12 of the Salvage clinical trial.      The active problem list, all pertinent past medical history, medications, liver histology, endoscopic studies, radiologic findings and laboratory findings related to the liver disorder were reviewed with the patient. The patient is a 57 y.o. Black male who was noted to have abnormalities in liver chemistries and subsequently tested positive for chronic HCV in the  2000s. 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 There was an episode of acute incteric hepatitis at the time of these risk factors. Ultrasound of the liver was performed in 4/2017. The results of the imaging demonstrated a normal appearing liver. An assessment of liver fibrosis with Fibrosure was 0.43 consistent with stage F2. The patient was treated with Deedee Malik from 4-6/2017. The patient was treated for 8 weeks. The patient tolerated treatment reasonably well. HCV RNA levels obtained during treatment demonstrate a response followed by relapse. The most recent laboratory studies indicate that the liver transaminases are normal, alkaline phosphatase is normal, tests of hepatic synthetic and metabolic function are normal, and the platelet count is normal.      The patient has no symptoms which can be attributed to the liver disorder. The patient completes all daily activities without any functional limitations. The patient has not experienced fatigue, fevers, chills, shortness of breath, chest pain, pain in the right side over the liver, diffuse abdominal pain, nausea, vomiting, constipation, diarrhrea, dry eyes, dry mouth, arthralgias, myalgias, yellowing of the eyes or skin, itching, dark urine, problems concentrating, swelling of the abdomen, swelling of the lower extremities, hematemesis, or hematochezia. ALLERGIES  Allergies   Allergen Reactions    Sean [Amlodipine-Olmesartan] Palpitations and Other (comments)     Fatigue, headache, pt states \"kidneys hurt\"       MEDICATIONS  Current Outpatient Prescriptions   Medication Sig    amLODIPine (NORVASC) 5 mg tablet Take 1 Tab by mouth two (2) times a day. take 1 tablet by mouth once daily    lisinopril (PRINIVIL, ZESTRIL) 40 mg tablet take 1 tablet by mouth once daily    hydroCHLOROthiazide (HYDRODIURIL) 25 mg tablet take 1 tablet by mouth once daily    triamcinolone acetonide (KENALOG) 0.1 % topical cream Apply  to affected area two (2) times a day. use thin layer    aspirin 81 mg tablet Take 81 mg by mouth daily.  MULTIVITAMIN PO Take  by mouth daily.  urea (CARMOL) 40 % topical cream Apply  to affected area two (2) times a day.  tadalafil (CIALIS) 10 mg tablet Take 1 Tab by mouth daily as needed. No current facility-administered medications for this visit. SYSTEM REVIEW NOT RELATED TO LIVER DISEASE OR REVIEWED ABOVE:  Constitution systems: Negative for fever, chills, weight gain, weight loss. Eyes: Negative for visual changes. ENT: Negative for sore throat, painful swallowing. Respiratory: Negative for cough, hemoptysis, SOB. Cardiology: Negative for chest pain, palpitations. GI:  Negative for constipation or diarrhea. : Negative for urinary frequency, dysuria, hematuria, nocturia. Skin: Negative for rash. Hematology: Negative for easy bruising, blood clots. Musculo-skelatal: Negative for back pain, muscle pain, weakness. Neurologic: Negative for headaches, dizziness, vertigo, memory problems not related to HE. Psychology: Negative for anxiety, depression. FAMILY HISTORY:  The father  of alcoholism. The mother has the following chronic diseases: dementia. There is no family history of liver disease. SOCIAL HISTORY:  The patient is . The spouse has been tested for HCV and is negative. The patient has 3 children, 1 stepchildren, 1 adopted and 12 grandchildren. The patient stopped using tobacco products in 1970s. The patient consumes 1 alcoholic beverages per day. The patient currently works full time . PHYSICAL EXAMINATION:  Visit Vitals    /89 (BP 1 Location: Right arm, BP Patient Position: Sitting)    Pulse 70    Temp 96.3 °F (35.7 °C) (Tympanic)    Resp 16    Ht 5' 7\" (1.702 m)    Wt 171 lb (77.6 kg)    SpO2 98%    BMI 26.78 kg/m2     General: No acute distress. Eyes: Sclera anicteric. ENT: No oral lesions. Thyroid normal.  Nodes: No adenopathy. Skin: No spider angiomata. No jaundice. No palmar erythema. Respiratory: Lungs clear to auscultation. Cardiovascular: Regular heart rate. No murmurs. No JVD. Abdomen: Soft non-tender. Liver size normal to percussion/palpation. Spleen not palpable. No obvious ascites. Extremities: No edema. No muscle wasting. No gross arthritic changes. Neurologic: Alert and oriented. Cranial nerves grossly intact. No asterixis. LABORATORY STUDIES:  [address] & Units 11/20/2017 10/20/2017   WBC 4.0 - 11.0 K/uL     ANC 1.8 - 7.7 K/uL     HGB 13.1 - 17.2 g/dL      - 440 K/uL     AST 15 - 37 U/L 25 28   ALT 16 - 61 U/L 27 18   Alk Phos 45 - 117 U/L 83 77   Bili, Total 0.2 - 1.0 MG/DL 0.4 0.3   Bili, Direct 0.0 - 0.2 MG/DL 0.1    Albumin 3.4 - 5.0 g/dL 3.9 4.6   BUN 6 - 22 mg/dL  17   Creat 0.8 - 1.6 mg/dL  1.2   Na 133 - 145 mmol/L  138   K 3.5 - 5.5 mmol/L  4.5   Cl 98 - 110 mmol/L  95 (L)   CO2 20 - 32 mmol/L  24   Glucose 70 - 99 mg/dL  92     [facility] Latest Ref Rng & Units 9/28/2017   WBC 4.0 - 11.0 K/uL 7.7   ANC 1.8 - 7.7 K/uL 3.3   HGB 13.1 - 17.2 g/dL 15.2    - 440 K/uL 277   AST 15 - 37 U/L 23   ALT 16 - 61 U/L 16   Alk Phos 45 - 117 U/L 68   Bili, Total 0.2 - 1.0 MG/DL 0.2   Bili, Direct 0.0 - 0.2 MG/DL <0.2   Albumin 3.4 - 5.0 g/dL 4.6   BUN 6 - 22 mg/dL 19   Creat 0.8 - 1.6 mg/dL 1.3   Na 133 - 145 mmol/L 141   K 3.5 - 5.5 mmol/L 4.0   Cl 98 - 110 mmol/L 95 (L)   CO2 20 - 32 mmol/L 17 (L)   Glucose 70 - 99 mg/dL 78     SEROLOGIES:  1/2015. HCV RNA Log 6.5 IU/ml,   10/2016. HAV total positive, HBsAntigen negative, anti-HBcore negative, anti-HBsurface negative, HCV Geneotype 1A, anti-HIV negative  12/2016. HCV RNA undetectable  1/2017. HCV RNA undetectable  4/2017. HCV RNA Log 6.06 IU/ml,     LIVER HISTOLOGY:  3/2015. HCV Fibrosure 0.43. Consistent with stage 2 fibrosis. 4/2017. HCV Fibrosure 0.55. Consistent with stage 2 fibrosis. ENDOSCOPIC PROCEDURES:  3/2015. Colonoscopy by Dr Salena Gutiérrez. No polyps    RADIOLOGY:  4/2017. Ultrasound of liver. Normal appearing liver. No liver mass lesions. OTHER TESTING:  Not available or performed    ASSESSMENT AND PLAN:  Chronic HCV with stage 2 fibrosis by Fibrosure. The most recent laboratory studies indicate that the liver transaminases are normal, alkaline phosphatase is normal, tests of hepatic synthetic and metabolic function are normal, and the platelet count is normal. Labs per study protocol. The patient was previously treated for HCV with Joyceann Tony. There was a response then relapse. The patient has HCV genotype 1A. The patient is enrolled DAA-Salvage  clinical trial for treatment experienced patients previously exposed to NS5A inhibitor + SOF. He is in the 12 week arm for non-cirrhotics. This is treatment week 12. He takes his last dose of the medication today. The patient was directed to continue all current medications at the current dosages. There are no contraindications for the patient to take any medications that are necessary for treatment of other medical issues. The patient was counseled regarding alcohol consumption. Vaccination for viral hepatitis A is not required. The patient has serologic evidence of prior exposure or vaccination with immunity. Vaccination for viral hepatitis B is recommended. The patient does not have serologic evidence of prior exposure or vaccination with immunity. All of the above issues were discussed with the patient. All questions were answered. The patient expressed a clear understanding of the above. [facility] 87 in 4 weeks per study protocol.      Aristides Guerra, ALAN   [facility] [place] of [address] [place] [address] WEST, [address] [place] [address] [address], [facility]   [phone]

## 2017-11-20 NOTE — PROGRESS NOTES
Ligia Moe is a 57 y.o. male    No chief complaint on file. 1. Have you been to the ER, urgent care clinic or hospitalized since your last visit? NO.     2. Have you seen or consulted any other health care providers outside of the [address] [place] [address] since your last visit (Include any pap smears or colon screening)?  NO  Learning Assessment 4/14/2014   PRIMARY LEARNER Patient   HIGHEST LEVEL OF EDUCATION - PRIMARY LEARNER  2 YEARS OF COLLEGE   BARRIERS PRIMARY LEARNER NONE   CO-LEARNER CAREGIVER No   PRIMARY LANGUAGE ENGLISH   LEARNER PREFERENCE PRIMARY LISTENING     -   ANSWERED BY patient   RELATIONSHIP SELF

## 2017-12-18 ENCOUNTER — HOSPITAL ENCOUNTER (OUTPATIENT)
Dept: LAB | Age: 62
Discharge: HOME | End: 2017-12-18

## 2017-12-18 ENCOUNTER — OFFICE VISIT (OUTPATIENT)
Dept: HEMATOLOGY | Age: 62
End: 2017-12-18

## 2017-12-18 VITALS
TEMPERATURE: 97.3 F | HEIGHT: 67 IN | BODY MASS INDEX: 26.84 KG/M2 | SYSTOLIC BLOOD PRESSURE: 155 MMHG | OXYGEN SATURATION: 99 % | HEART RATE: 83 BPM | DIASTOLIC BLOOD PRESSURE: 83 MMHG | WEIGHT: 171 LBS | RESPIRATION RATE: 18 BRPM

## 2017-12-18 DIAGNOSIS — B18.2 CHRONIC HEPATITIS C WITHOUT HEPATIC COMA (HCC): Primary | ICD-10-CM

## 2017-12-18 PROCEDURE — 99000 SPECIMEN HANDLING OFFICE-LAB: CPT | Performed by: INTERNAL MEDICINE

## 2017-12-18 NOTE — PROGRESS NOTES
[address] Lar[address] Sampson MD, Jamison Galan, [place], [place]       Nessa Oliveros, MEG Romo, [facility]   ALAN Prieto NP        at [address], [address] [facility], [address]     [phone]     FAX: [phone]    at [address], [address] [place] [address], [address]     [phone]     FAX: [phone]         Patient Care Team:  Will Haq MD as PCP - General (Family Practice)  Blaire Bennett MD (Gastroenterology)      Problem List  Date Reviewed: 11/3/2017          Codes Class Noted    Chronic hepatitis C without hepatic coma [facility]) ICD-10-CM: B18.2  ICD-9-CM: 070.54  8/3/2015        Hydrocele, right ICD-10-CM: N43.3  ICD-9-CM: 603.9  8/3/2015        BPH (benign prostatic hypertrophy) ICD-10-CM: N40.0  ICD-9-CM: 600.00  10/15/2012        Hyperlipidemia ICD-10-CM: E78.5  ICD-9-CM: 272.4  9/30/2011        HTN (hypertension) ICD-10-CM: I10  ICD-9-CM: 401.9  9/30/2011        ED (erectile dysfunction) ICD-10-CM: N52.9  ICD-9-CM: 607.84  9/30/2011                Marylin Garcia returns to the [place] [address] today for education and management of chronic HCV. The patient is here for post treatment week #4 of the Salvage clinical trial.      The active problem list, all pertinent past medical history, medications, liver histology, endoscopic studies, radiologic findings and laboratory findings related to the liver disorder were reviewed with the patient. The patient is a 57 y.o. Black male who was noted to have abnormalities in liver chemistries and subsequently tested positive for chronic HCV in the  2000s. Risk factors for acquiring HCV are IV drug use, inhaling cocaine, tattoos, and mass vaccination after enlisting in the [facility] between 1001-0296.   There was an episode of acute incteric hepatitis at the time of these risk factors. Ultrasound of the liver was performed in 4/2017. The results of the imaging demonstrated a normal appearing liver. An assessment of liver fibrosis with Fibrosure was 0.43 consistent with stage F2. The patient was treated with Deedee Malik from 4-6/2017. The patient was treated for 8 weeks. The patient tolerated treatment reasonably well. HCV RNA levels obtained during treatment demonstrate a response followed by relapse. The most recent laboratory studies indicate that the liver transaminases are normal, alkaline phosphatase is normal, tests of hepatic synthetic and metabolic function are normal, and the platelet count is normal.      The patient has no symptoms which can be attributed to the liver disorder. The patient completes all daily activities without any functional limitations. The patient has not experienced fatigue, fevers, chills, shortness of breath, chest pain, pain in the right side over the liver, diffuse abdominal pain, nausea, vomiting, constipation, diarrhrea, dry eyes, dry mouth, arthralgias, myalgias, yellowing of the eyes or skin, itching, dark urine, problems concentrating, swelling of the abdomen, swelling of the lower extremities, hematemesis, or hematochezia. ALLERGIES  Allergies   Allergen Reactions    Sean [Amlodipine-Olmesartan] Palpitations and Other (comments)     Fatigue, headache, pt states \"kidneys hurt\"       MEDICATIONS  Current Outpatient Prescriptions   Medication Sig    amLODIPine (NORVASC) 5 mg tablet Take 1 Tab by mouth two (2) times a day. take 1 tablet by mouth once daily (Patient taking differently: Take 5 mg by mouth daily. take 1 tablet by mouth once daily)    lisinopril (PRINIVIL, ZESTRIL) 40 mg tablet take 1 tablet by mouth once daily    hydroCHLOROthiazide (HYDRODIURIL) 25 mg tablet take 1 tablet by mouth once daily    tadalafil (CIALIS) 10 mg tablet Take 1 Tab by mouth daily as needed.     aspirin 81 mg tablet Take 81 mg by mouth daily.  MULTIVITAMIN PO Take  by mouth daily.  urea (CARMOL) 40 % topical cream Apply  to affected area two (2) times a day.  triamcinolone acetonide (KENALOG) 0.1 % topical cream Apply  to affected area two (2) times a day. use thin layer     No current facility-administered medications for this visit. SYSTEM REVIEW NOT RELATED TO LIVER DISEASE OR REVIEWED ABOVE:  Constitution systems: Negative for fever, chills, weight gain, weight loss. Eyes: Negative for visual changes. ENT: Negative for sore throat, painful swallowing. Respiratory: Negative for cough, hemoptysis, SOB. Cardiology: Negative for chest pain, palpitations. GI:  Negative for constipation or diarrhea. : Negative for urinary frequency, dysuria, hematuria, nocturia. Skin: Negative for rash. Hematology: Negative for easy bruising, blood clots. Musculo-skelatal: Negative for back pain, muscle pain, weakness. Neurologic: Negative for headaches, dizziness, vertigo, memory problems not related to HE. Psychology: Negative for anxiety, depression. FAMILY HISTORY:  The father  of alcoholism. The mother has the following chronic diseases: dementia. There is no family history of liver disease. SOCIAL HISTORY:  The patient is . The spouse has been tested for HCV and is negative. The patient has 3 children, 1 stepchildren, 1 adopted and 12 grandchildren. The patient stopped using tobacco products in 1970s. The patient consumes 1 alcoholic beverages per day. The patient currently works full time . PHYSICAL EXAMINATION:  Visit Vitals    /83 (BP 1 Location: Right arm, BP Patient Position: Sitting)    Pulse 83    Temp 97.3 °F (36.3 °C) (Tympanic)    Resp 18    Ht 5' 7\" (1.702 m)    Wt 171 lb (77.6 kg)    SpO2 99%    BMI 26.78 kg/m2     General: No acute distress. Eyes: Sclera anicteric. ENT: No oral lesions. Thyroid normal.  Nodes: No adenopathy.    Skin: No spider angiomata. No jaundice. No palmar erythema. Respiratory: Lungs clear to auscultation. Cardiovascular: Regular heart rate. No murmurs. No JVD. Abdomen: Soft non-tender. Liver size normal to percussion/palpation. Spleen not palpable. No obvious ascites. Extremities: No edema. No muscle wasting. No gross arthritic changes. Neurologic: Alert and oriented. Cranial nerves grossly intact. No asterixis. LABORATORY STUDIES:  [address] & Units 11/20/2017 10/20/2017   WBC 4.0 - 11.0 K/uL     ANC 1.8 - 7.7 K/uL     HGB 13.1 - 17.2 g/dL      - 440 K/uL     AST 15 - 37 U/L 25 28   ALT 16 - 61 U/L 27 18   Alk Phos 45 - 117 U/L 83 77   Bili, Total 0.2 - 1.0 MG/DL 0.4 0.3   Bili, Direct 0.0 - 0.2 MG/DL 0.1    Albumin 3.4 - 5.0 g/dL 3.9 4.6   BUN 6 - 22 mg/dL  17   Creat 0.8 - 1.6 mg/dL  1.2   Na 133 - 145 mmol/L  138   K 3.5 - 5.5 mmol/L  4.5   Cl 98 - 110 mmol/L  95 (L)   CO2 20 - 32 mmol/L  24   Glucose 70 - 99 mg/dL  92     [facility] Latest Ref Rng & Units 9/28/2017   WBC 4.0 - 11.0 K/uL 7.7   ANC 1.8 - 7.7 K/uL 3.3   HGB 13.1 - 17.2 g/dL 15.2    - 440 K/uL 277   AST 15 - 37 U/L 23   ALT 16 - 61 U/L 16   Alk Phos 45 - 117 U/L 68   Bili, Total 0.2 - 1.0 MG/DL 0.2   Bili, Direct 0.0 - 0.2 MG/DL <0.2   Albumin 3.4 - 5.0 g/dL 4.6   BUN 6 - 22 mg/dL 19   Creat 0.8 - 1.6 mg/dL 1.3   Na 133 - 145 mmol/L 141   K 3.5 - 5.5 mmol/L 4.0   Cl 98 - 110 mmol/L 95 (L)   CO2 20 - 32 mmol/L 17 (L)   Glucose 70 - 99 mg/dL 78     SEROLOGIES:  1/2015. HCV RNA Log 6.5 IU/ml,   10/2016. HAV total positive, HBsAntigen negative, anti-HBcore negative, anti-HBsurface negative, HCV Geneotype 1A, anti-HIV negative  12/2016. HCV RNA undetectable  1/2017. HCV RNA undetectable  4/2017. HCV RNA Log 6.06 IU/ml,     LIVER HISTOLOGY:  3/2015. HCV Fibrosure 0.43. Consistent with stage 2 fibrosis. 4/2017. HCV Fibrosure 0.55.   Consistent with stage 2 fibrosis. ENDOSCOPIC PROCEDURES:  3/2015. Colonoscopy by Dr Lynne Guerra. No polyps    RADIOLOGY:  4/2017. Ultrasound of liver. Normal appearing liver. No liver mass lesions. OTHER TESTING:  Not available or performed    ASSESSMENT AND PLAN:  Chronic HCV with stage 2 fibrosis by Fibrosure. The most recent laboratory studies indicate that the liver transaminases are normal, alkaline phosphatase is normal, tests of hepatic synthetic and metabolic function are normal, and the platelet count is normal. Labs per study protocol. The patient was previously treated for HCV with Jofernandezearadha Tony. There was a response then relapse. The patient has HCV genotype 1A. The patient is enrolled DAA-Salvage  clinical trial for treatment experienced patients previously exposed to NS5A inhibitor + SOF. He is in the 12 week arm for non-cirrhotics. This is post-treatment week 4. He had no treatment related complaints. The patient was directed to continue all current medications at the current dosages. There are no contraindications for the patient to take any medications that are necessary for treatment of other medical issues. The patient was counseled regarding alcohol consumption. Vaccination for viral hepatitis A is not required. The patient has serologic evidence of prior exposure or vaccination with immunity. Vaccination for viral hepatitis B is recommended. The patient does not have serologic evidence of prior exposure or vaccination with immunity. All of the above issues were discussed with the patient. All questions were answered. The patient expressed a clear understanding of the above. [facility] 87 in 8 weeks per study protocol and to assess for SVR 12.       Aristides Guerra, ALAN   [facility] [place] of [address] [place] [address], [address] [facility], [facility]   [phone]

## 2017-12-18 NOTE — PROGRESS NOTES
Hunter Corbett is a 57 y.o. male    No chief complaint on file. 1. Have you been to the ER, urgent care clinic or hospitalized since your last visit? NO.     2. Have you seen or consulted any other health care providers outside of the [address] [place] [address] since your last visit (Include any pap smears or colon screening)?  NO  Learning Assessment 12/18/2017   PRIMARY LEARNER Patient   HIGHEST LEVEL OF EDUCATION - PRIMARY LEARNER  -   BARRIERS PRIMARY LEARNER NONE     NONE   CO-LEARNER CAREGIVER No   PRIMARY LANGUAGE ENGLISH   LEARNER PREFERENCE PRIMARY LISTENING     -   ANSWERED BY patient   RELATIONSHIP SELF

## 2017-12-19 LAB
HCV GRAPH, HCVGR: NORMAL
HCV RNA SERPL NAA+PROBE-ACNC: NORMAL IU/ML
SERIAL MONITORING: NORMAL

## 2018-01-22 ENCOUNTER — OFFICE VISIT (OUTPATIENT)
Dept: FAMILY MEDICINE CLINIC | Age: 63
End: 2018-01-22

## 2018-01-22 VITALS
RESPIRATION RATE: 20 BRPM | HEIGHT: 67 IN | HEART RATE: 79 BPM | BODY MASS INDEX: 27.31 KG/M2 | WEIGHT: 174 LBS | TEMPERATURE: 97 F | DIASTOLIC BLOOD PRESSURE: 80 MMHG | SYSTOLIC BLOOD PRESSURE: 154 MMHG

## 2018-01-22 DIAGNOSIS — B18.2 CHRONIC HEPATITIS C WITHOUT HEPATIC COMA (HCC): ICD-10-CM

## 2018-01-22 DIAGNOSIS — I10 ESSENTIAL HYPERTENSION: Primary | ICD-10-CM

## 2018-01-22 NOTE — MR AVS SNAPSHOT
Rony Ogden 
 
 
 [address] [address] [address] [address] [phone] Patient: Lucio Puckett MRN: [id number] [id number] Visit Information Date & Time Provider Department Dept. Phone Encounter #  
 1/22/2018  1:30 PM Faye Snider NP [address] [id number] Follow-up Instructions Return in about 3 months (around 4/22/2018), or if symptoms worsen or fail to improve, for PCP follow up. Your Appointments 2/12/2018  1:00 PM  
[place] [address] [facility] Appt Note: DAA Salvage Post Treatment Week 12-EOS  
 [address] [employer], [address] [place] [address]  
  
   
 [address] Upcoming Health Maintenance Date Due COLONOSCOPY 3/12/2020 DTaP/Tdap/Td series (2 - Td) 10/2/2025 Allergies as of 1/22/2018  Review Complete On: 1/22/2018 By: Faye Snider NP Severity Noted Reaction Type Reactions Sean [Amlodipine-olmesartan] High 08/15/2012    Palpitations, Other (comments) Fatigue, headache, pt states \"kidneys hurt\" Current Immunizations  Reviewed on 1/27/2017 Name Date Influenza Vaccine (Quad) PF 11/3/2017, 9/29/2016 Tdap 10/2/2015 Not reviewed this visit You Were Diagnosed With   
  
 Codes Comments Essential hypertension    -  Primary ICD-10-CM: I10 
ICD-9-CM: 401.9 Chronic hepatitis C without hepatic coma (HCC)     ICD-10-CM: B18.2 ICD-9-CM: 070.54 Vitals BP Pulse Temp Resp Height(growth percentile) Weight(growth percentile) 154/80 (BP 1 Location: Right arm, BP Patient Position: Sitting) 79 97 °F (36.1 °C) (Oral) 20 5' 7\" (1.702 m) 174 lb (78.9 kg) BMI Smoking Status 27.25 kg/m2 Former Smoker Vitals History BMI and BSA Data Body Mass Index Body Surface Area 27.25 kg/m 2 1.93 m 2 [id number] [address] [phone] Your Updated Medication List  
  
   
This list is accurate as of: 1/22/18  2:51 PM.  Always use your most recent med list.  
  
  
  
  
 aspirin 81 mg tablet Take 81 mg by mouth daily. hydroCHLOROthiazide 25 mg tablet Commonly known as:  HYDRODIURIL  
take 1 tablet by mouth once daily  
  
 lisinopril 40 mg tablet Commonly known as:  PRINIVIL, ZESTRIL  
take 1 tablet by mouth once daily MULTIVITAMIN PO Take  by mouth daily. tadalafil 10 mg tablet Commonly known as:  CIALIS Take 1 Tab by mouth daily as needed. triamcinolone acetonide 0.1 % topical cream  
Commonly known as:  KENALOG Apply  to affected area two (2) times a day. use thin layer  
  
 urea 40 % topical cream  
Commonly known as:  CARMOL Apply  to affected area two (2) times a day. We Performed the Following REFERRAL TO CARDIOLOGY [UOQ69 Custom] Comments:  
 Please evaluate and treat patient for hypertension. Follow-up Instructions Return in about 3 months (around 4/22/2018), or if symptoms worsen or fail to improve, for PCP follow up. Referral Information Referral ID Referred By Referred To  
  
 [id number] [facility] [address]. [place] [address] Phone: [phone] Fax: [phone] Visits Status Start Date End Date 1 New Request 1/22/18 1/22/19 If your referral has a status of pending review or denied, additional information will be sent to support the outcome of this decision. Patient Instructions Please contact our office if you have any questions about your visit today. Amlodipine (By mouth) Amlodipine ([id number]) Treats high blood pressure and angina (chest pain).  This medicine is a calcium channel blocker. Brand Name(s): Norvasc There may be other brand names for this medicine. When This Medicine Should Not Be Used: This medicine is not right for everyone. Do not use it if you had an allergic reaction to amlodipine. How to Use This Medicine:  
Tablet, Dissolving Tablet · Take your medicine as directed. Your dose may need to be changed several times to find what works best for you. Take this medicine at the same time each day. · Read and follow the patient instructions that come with this medicine. Talk to your doctor or pharmacist if you have any questions. · Missed dose: Take a dose as soon as you remember. If it has been more than 12 hours since you were supposed to take your dose, skip the missed dose and take your next regular dose at the regular time. · Store the medicine in a closed container at room temperature, away from heat, moisture, and direct light. Drugs and Foods to Avoid: Ask your doctor or pharmacist before using any other medicine, including over-the-counter medicines, vitamins, and herbal products. · Some medicines can affect how amlodipine works. Tell your doctor if you are also using any of the following: ¨ Clarithromycin, cyclosporine, diltiazem, itraconazole, ritonavir, sildenafil, simvastatin, tacrolimus Warnings While Using This Medicine: · Tell your doctor if you are pregnant or breastfeeding, or if you have liver disease, heart disease, coronary artery disease, or aortic stenosis. · This medicine could lower your blood pressure too much, especially when you first use it or if you are dehydrated. Stand or sit up slowly if you feel lightheaded or dizzy. · Your doctor will check your progress and the effects of this medicine at regular visits. Keep all appointments. · Do not stop using this medicine without asking your doctor, even if you feel well.  This medicine will not cure high blood pressure, but it will help keep it in normal range. You may have to take blood pressure medicine for the rest of your life. · Keep all medicine out of the reach of children. Never share your medicine with anyone. Possible Side Effects While Using This Medicine:  
Call your doctor right away if you notice any of these side effects: · Allergic reaction: Itching or hives, swelling in your face or hands, swelling or tingling in your mouth or throat, chest tightness, trouble breathing · Lightheadedness, dizziness · New or worsening chest pain · Swelling in your hands, ankles, or legs · Trouble breathing, nausea, unusual sweating, fainting If you notice other side effects that you think are caused by this medicine, tell your doctor. Call your doctor for medical advice about side effects. You may report side effects to FDA at 1-974-FDA-3420 © 2017 [address] Information is for End User's use only and may not be sold, redistributed or otherwise used for commercial purposes. The above information is an  only. It is not intended as medical advice for individual conditions or treatments. Talk to your doctor, nurse or pharmacist before following any medical regimen to see if it is safe and effective for you. Amlodipine (Hypertenipine-2.5, Norvasc) - (By mouth) Why this medicine is used:  
Treats high blood pressure and angina (chest pain). Contact a nurse or doctor right away if you have: · Blistering, peeling, red skin rash · Chest pain that may spread to your arms, jaw, back, or neck · Trouble breathing · Swelling in your hands, ankles, legs · Unusual sweating, faintness Common side effects: · Tiredness, drowsiness · Headache © 2017 [address] Information is for End User's use only and may not be sold, redistributed or otherwise used for commercial purposes. DASH Diet: Care Instructions Your Care Instructions The DASH diet is an eating plan that can help lower your blood pressure. DASH stands for Dietary Approaches to Stop Hypertension. Hypertension is high blood pressure. The DASH diet focuses on eating foods that are high in calcium, potassium, and magnesium. These nutrients can lower blood pressure. The foods that are highest in these nutrients are fruits, vegetables, low-fat dairy products, nuts, seeds, and legumes. But taking calcium, potassium, and magnesium supplements instead of eating foods that are high in those nutrients does not have the same effect. The DASH diet also includes whole grains, fish, and poultry. The DASH diet is one of several lifestyle changes your doctor may recommend to lower your high blood pressure. Your doctor may also want you to decrease the amount of sodium in your diet. Lowering sodium while following the DASH diet can lower blood pressure even further than just the DASH diet alone. Follow-up care is a key part of your treatment and safety. Be sure to make and go to all appointments, and call your doctor if you are having problems. It's also a good idea to know your test results and keep a list of the medicines you take. How can you care for yourself at home? Following the DASH diet · Eat 4 to 5 servings of fruit each day. A serving is 1 medium-sized piece of fruit, ½ cup chopped or canned fruit, 1/4 cup dried fruit, or 4 ounces (½ cup) of fruit juice. Choose fruit more often than fruit juice. · Eat 4 to 5 servings of vegetables each day. A serving is 1 cup of lettuce or raw leafy vegetables, ½ cup of chopped or cooked vegetables, or 4 ounces (½ cup) of vegetable juice. Choose vegetables more often than vegetable juice. · Get 2 to 3 servings of low-fat and fat-free dairy each day. A serving is 8 ounces of milk, 1 cup of yogurt, or 1 ½ ounces of cheese. · Eat 6 to 8 servings of grains each day.  A serving is 1 slice of bread, 1 ounce of dry cereal, or ½ cup of cooked rice, pasta, or cooked cereal. Try to choose whole-grain products as much as possible. · Limit lean meat, poultry, and fish to 2 servings each day. A serving is 3 ounces, about the size of a deck of cards. · Eat 4 to 5 servings of nuts, seeds, and legumes (cooked dried beans, lentils, and split peas) each week. A serving is 1/3 cup of nuts, 2 tablespoons of seeds, or ½ cup of cooked beans or peas. · Limit fats and oils to 2 to 3 servings each day. A serving is 1 teaspoon of vegetable oil or 2 tablespoons of salad dressing. · Limit sweets and added sugars to 5 servings or less a week. A serving is 1 tablespoon jelly or jam, ½ cup sorbet, or 1 cup of lemonade. · Eat less than 2,300 milligrams (mg) of sodium a day. If you limit your sodium to 1,500 mg a day, you can lower your blood pressure even more. Tips for success · Start small. Do not try to make dramatic changes to your diet all at once. You might feel that you are missing out on your favorite foods and then be more likely to not follow the plan. Make small changes, and stick with them. Once those changes become habit, add a few more changes. · Try some of the following: ¨ Make it a goal to eat a fruit or vegetable at every meal and at snacks. This will make it easy to get the recommended amount of fruits and vegetables each day. ¨ Try yogurt topped with fruit and nuts for a snack or healthy dessert. ¨ Add lettuce, tomato, cucumber, and onion to sandwiches. ¨ Combine a ready-made pizza crust with low-fat mozzarella cheese and lots of vegetable toppings. Try using tomatoes, squash, spinach, broccoli, carrots, cauliflower, and onions. ¨ Have a variety of cut-up vegetables with a low-fat dip as an appetizer instead of chips and dip. ¨ Sprinkle sunflower seeds or chopped almonds over salads. Or try adding chopped walnuts or almonds to cooked vegetables. ¨ Try some vegetarian meals using beans and peas. Add garbanzo or kidney beans to salads. Make burritos and tacos with mashed mckenna beans or black beans. Where can you learn more? Go to http://magdiel-rom.info/. Enter M066 in the search box to learn more about \"DASH Diet: Care Instructions. \" Current as of: September 21, 2016 Content Version: 11.4 © 1957-5250 [employer]. Care instructions adapted under license by [employer] (which disclaims liability or warranty for this information). If you have questions about a medical condition or this instruction, always ask your healthcare professional. [place] [address] any warranty or liability for your use of this information. Introducing [facility] & HEALTH SERVICES! Dear Hernan Drew: Thank you for requesting a [employer] account. Our records indicate that you have previously registered for a [employer] account but its currently inactive. Please call our [employer] support line at [phone]. Additional Information If you have questions, please visit the Frequently Asked Questions section of the [employer] website at [email or web address] Remember, [employer] is NOT to be used for urgent needs. For medical emergencies, dial 911. Now available from your iPhone and Android! Please provide this summary of care documentation to your next provider. Your primary care clinician is listed as AUGUSTINE FERRARI. If you have any questions after today's visit, please call [phone].

## 2018-01-22 NOTE — PATIENT INSTRUCTIONS
Please contact our office if you have any questions about your visit today. Amlodipine (By mouth)   Amlodipine (da-STN-nl-peen)  Treats high blood pressure and angina (chest pain). This medicine is a calcium channel blocker. Brand Name(s): Norvasc   There may be other brand names for this medicine. When This Medicine Should Not Be Used: This medicine is not right for everyone. Do not use it if you had an allergic reaction to amlodipine. How to Use This Medicine:   Tablet, Dissolving Tablet  · Take your medicine as directed. Your dose may need to be changed several times to find what works best for you. Take this medicine at the same time each day. · Read and follow the patient instructions that come with this medicine. Talk to your doctor or pharmacist if you have any questions. · Missed dose: Take a dose as soon as you remember. If it has been more than 12 hours since you were supposed to take your dose, skip the missed dose and take your next regular dose at the regular time. · Store the medicine in a closed container at room temperature, away from heat, moisture, and direct light. Drugs and Foods to Avoid:   Ask your doctor or pharmacist before using any other medicine, including over-the-counter medicines, vitamins, and herbal products. · Some medicines can affect how amlodipine works. Tell your doctor if you are also using any of the following:   ¨ Clarithromycin, cyclosporine, diltiazem, itraconazole, ritonavir, sildenafil, simvastatin, tacrolimus  Warnings While Using This Medicine:   · Tell your doctor if you are pregnant or breastfeeding, or if you have liver disease, heart disease, coronary artery disease, or aortic stenosis. · This medicine could lower your blood pressure too much, especially when you first use it or if you are dehydrated. Stand or sit up slowly if you feel lightheaded or dizzy. · Your doctor will check your progress and the effects of this medicine at regular visits.  Keep all appointments. · Do not stop using this medicine without asking your doctor, even if you feel well. This medicine will not cure high blood pressure, but it will help keep it in normal range. You may have to take blood pressure medicine for the rest of your life. · Keep all medicine out of the reach of children. Never share your medicine with anyone. Possible Side Effects While Using This Medicine:   Call your doctor right away if you notice any of these side effects:  · Allergic reaction: Itching or hives, swelling in your face or hands, swelling or tingling in your mouth or throat, chest tightness, trouble breathing  · Lightheadedness, dizziness  · New or worsening chest pain  · Swelling in your hands, ankles, or legs  · Trouble breathing, nausea, unusual sweating, fainting  If you notice other side effects that you think are caused by this medicine, tell your doctor. Call your doctor for medical advice about side effects. You may report side effects to FDA at 6-872-FDA-7593  © 2017 [address] Information is for End User's use only and may not be sold, redistributed or otherwise used for commercial purposes. The above information is an  only. It is not intended as medical advice for individual conditions or treatments. Talk to your doctor, nurse or pharmacist before following any medical regimen to see if it is safe and effective for you. Amlodipine (Hypertenipine-2.5, Norvasc) - (By mouth)   Why this medicine is used:   Treats high blood pressure and angina (chest pain).   Contact a nurse or doctor right away if you have:  · Blistering, peeling, red skin rash  · Chest pain that may spread to your arms, jaw, back, or neck  · Trouble breathing  · Swelling in your hands, ankles, legs  · Unusual sweating, faintness     Common side effects:  · Tiredness, drowsiness  · Headache  © 2017 [address] Information is for End User's use only and may not be sold, redistributed or otherwise used for commercial purposes. DASH Diet: Care Instructions  Your Care Instructions    The DASH diet is an eating plan that can help lower your blood pressure. DASH stands for Dietary Approaches to Stop Hypertension. Hypertension is high blood pressure. The DASH diet focuses on eating foods that are high in calcium, potassium, and magnesium. These nutrients can lower blood pressure. The foods that are highest in these nutrients are fruits, vegetables, low-fat dairy products, nuts, seeds, and legumes. But taking calcium, potassium, and magnesium supplements instead of eating foods that are high in those nutrients does not have the same effect. The DASH diet also includes whole grains, fish, and poultry. The DASH diet is one of several lifestyle changes your doctor may recommend to lower your high blood pressure. Your doctor may also want you to decrease the amount of sodium in your diet. Lowering sodium while following the DASH diet can lower blood pressure even further than just the DASH diet alone. Follow-up care is a key part of your treatment and safety. Be sure to make and go to all appointments, and call your doctor if you are having problems. It's also a good idea to know your test results and keep a list of the medicines you take. How can you care for yourself at home? Following the DASH diet  · Eat 4 to 5 servings of fruit each day. A serving is 1 medium-sized piece of fruit, ½ cup chopped or canned fruit, 1/4 cup dried fruit, or 4 ounces (½ cup) of fruit juice. Choose fruit more often than fruit juice. · Eat 4 to 5 servings of vegetables each day. A serving is 1 cup of lettuce or raw leafy vegetables, ½ cup of chopped or cooked vegetables, or 4 ounces (½ cup) of vegetable juice. Choose vegetables more often than vegetable juice. · Get 2 to 3 servings of low-fat and fat-free dairy each day. A serving is 8 ounces of milk, 1 cup of yogurt, or 1 ½ ounces of cheese.   · Eat 6 to 8 servings of grains each day. A serving is 1 slice of bread, 1 ounce of dry cereal, or ½ cup of cooked rice, pasta, or cooked cereal. Try to choose whole-grain products as much as possible. · Limit lean meat, poultry, and fish to 2 servings each day. A serving is 3 ounces, about the size of a deck of cards. · Eat 4 to 5 servings of nuts, seeds, and legumes (cooked dried beans, lentils, and split peas) each week. A serving is 1/3 cup of nuts, 2 tablespoons of seeds, or ½ cup of cooked beans or peas. · Limit fats and oils to 2 to 3 servings each day. A serving is 1 teaspoon of vegetable oil or 2 tablespoons of salad dressing. · Limit sweets and added sugars to 5 servings or less a week. A serving is 1 tablespoon jelly or jam, ½ cup sorbet, or 1 cup of lemonade. · Eat less than 2,300 milligrams (mg) of sodium a day. If you limit your sodium to 1,500 mg a day, you can lower your blood pressure even more. Tips for success  · Start small. Do not try to make dramatic changes to your diet all at once. You might feel that you are missing out on your favorite foods and then be more likely to not follow the plan. Make small changes, and stick with them. Once those changes become habit, add a few more changes. · Try some of the following:  ¨ Make it a goal to eat a fruit or vegetable at every meal and at snacks. This will make it easy to get the recommended amount of fruits and vegetables each day. ¨ Try yogurt topped with fruit and nuts for a snack or healthy dessert. ¨ Add lettuce, tomato, cucumber, and onion to sandwiches. ¨ Combine a ready-made pizza crust with low-fat mozzarella cheese and lots of vegetable toppings. Try using tomatoes, squash, spinach, broccoli, carrots, cauliflower, and onions. ¨ Have a variety of cut-up vegetables with a low-fat dip as an appetizer instead of chips and dip. ¨ Sprinkle sunflower seeds or chopped almonds over salads.  Or try adding chopped walnuts or almonds to cooked vegetables. ¨ Try some vegetarian meals using beans and peas. Add garbanzo or kidney beans to salads. Make burritos and tacos with mashed mckenna beans or black beans. Where can you learn more? Go to http://magdiel-rom.info/. Enter K494 in the search box to learn more about \"DASH Diet: Care Instructions. \"  Current as of: September 21, 2016  Content Version: 11.4  © 7474-8624 [employer], [employer]. Care instructions adapted under license by [employer] (which disclaims liability or warranty for this information). If you have questions about a medical condition or this instruction, always ask your healthcare professional. [address] any warranty or liability for your use of this information.

## 2018-01-22 NOTE — PROGRESS NOTES
OLIVIA Minor is a 57 y.o. male  Chief Complaint   Patient presents with    Hepatitis C    Cholesterol Problem     high chol    Hypertension    Other     ichthyosis     Reports he would like to stop the Norvasc as his mother couldn't take it along with his aunt and his brothers. Reports he has completed some research on the internet and he would like to be removed from [employer]. Denies chest pain or shortness of breath. Reports seeing dermatology for skin discoloration and ichthyosis  Denies exercising. Liver specialist on 2/12/18 in [place] for hep C    Past Medical History  Past Medical History:   Diagnosis Date    BPH (benign prostatic hypertrophy) 10/15/2012    ED (erectile dysfunction) 9/30/2011    Hepatitis C     HTN (hypertension) 9/30/2011    Hyperlipidemia 9/30/2011       Surgical History  Past Surgical History:   Procedure Laterality Date    HX ACL RECONSTRUCTION          Medications  Current Outpatient Prescriptions   Medication Sig Dispense Refill    urea (CARMOL) 40 % topical cream Apply  to affected area two (2) times a day. 85 g 0    lisinopril (PRINIVIL, ZESTRIL) 40 mg tablet take 1 tablet by mouth once daily 90 Tab 1    hydroCHLOROthiazide (HYDRODIURIL) 25 mg tablet take 1 tablet by mouth once daily 90 Tab 3    triamcinolone acetonide (KENALOG) 0.1 % topical cream Apply  to affected area two (2) times a day. use thin layer 454 g 0    tadalafil (CIALIS) 10 mg tablet Take 1 Tab by mouth daily as needed. 4 Tab 3    aspirin 81 mg tablet Take 81 mg by mouth daily.  MULTIVITAMIN PO Take  by mouth daily.          Allergies  Allergies   Allergen Reactions    Sean [Amlodipine-Olmesartan] Palpitations and Other (comments)     Fatigue, headache, pt states \"kidneys hurt\"       Family History  Family History   Problem Relation Age of Onset    Hypertension Mother     Hypertension Father     Alcohol abuse Father     Hypertension Brother     Hypertension Brother Social History  Social History     Social History    Marital status:      Spouse name: N/A    Number of children: N/A    Years of education: N/A     Occupational History    Not on file. Social History Main Topics    Smoking status: Former Smoker    Smokeless tobacco: Never Used    Alcohol use No    Drug use: No    Sexual activity: Yes     Other Topics Concern    Not on file     Social History Narrative       Problem List  Patient Active Problem List   Diagnosis Code    Hyperlipidemia E78.5    HTN (hypertension) I10    ED (erectile dysfunction) N52.9    BPH (benign prostatic hypertrophy) N40.0    Chronic hepatitis C without hepatic coma (HCC) B18.2    Hydrocele, right N43.3       Review of Systems  Review of Systems   Constitutional: Negative for chills and fever. Respiratory: Negative for shortness of breath. Cardiovascular: Negative for chest pain and palpitations. Gastrointestinal: Negative for abdominal pain, blood in stool, constipation, diarrhea, nausea and vomiting. Genitourinary: Negative for dysuria, flank pain, frequency, hematuria and urgency. Musculoskeletal: Negative for back pain, falls and neck pain. Neurological: Negative for dizziness. Psychiatric/Behavioral: Negative for depression, substance abuse and suicidal ideas. Vital Signs  Vitals:    01/22/18 1418 01/22/18 1423   BP: 189/84 154/80   Pulse: 79    Resp: 20    Temp: 97 °F (36.1 °C)    TempSrc: Oral    Weight: 174 lb (78.9 kg)    Height: 5' 7\" (1.702 m)    PainSc:   0 - No pain        Physical Exam  Physical Exam   Constitutional: He is oriented to person, place, and time. HENT:   Mouth/Throat: Oropharynx is clear and moist.   Cardiovascular: Normal rate, regular rhythm and normal heart sounds. Pulmonary/Chest: Effort normal and breath sounds normal. No respiratory distress. He has no wheezes. Abdominal: Soft.  Bowel sounds are normal.   Neurological: He is alert and oriented to person, place, and time. Skin: Skin is warm and dry. Psychiatric: He has a normal mood and affect. His behavior is normal.   Vitals reviewed. Diagnostics  Orders Placed This Encounter    REFERRAL TO CARDIOLOGY     Referral Priority:   Routine     Referral Type:   Consultation     Referral Reason:   Specialty Services Required     Referred to Provider:   Usman Otto MD       Results  Results for orders placed or performed during the hospital encounter of 11/20/17   HEPATIC FUNCTION PANEL   Result Value Ref Range    Protein, total 8.3 (H) 6.4 - 8.2 g/dL    Albumin 3.9 3.4 - 5.0 g/dL    Globulin 4.4 (H) 2.0 - 4.0 g/dL    A-G Ratio 0.9 0.8 - 1.7      Bilirubin, total 0.4 0.2 - 1.0 MG/DL    Bilirubin, direct 0.1 0.0 - 0.2 MG/DL    Alk. phosphatase 83 45 - 117 U/L    AST (SGOT) 25 15 - 37 U/L    ALT (SGPT) 27 16 - 61 U/L   HCV, QT WITH GRAPH   Result Value Ref Range    Hepatitis C Quantitation HCV Not Detected IU/mL    Serial monitoring Comment      HCV Graph No graph provided           Assessment and Plan  Diagnoses and all orders for this visit:    1. Essential hypertension  -     REFERRAL TO CARDIOLOGY    2. Chronic hepatitis C without hepatic coma (HCC)      Reports he understands the risk of hypertension and accepts the risk of TIA, stroke, or MI with the discontinuation of his Norvasc. Reports he understands that his blood pressure is not in range. Encouraged to exercise and eat a diet high in fruits and vegetables. Stressed importance of cardiology follow up. Patient verbalized understanding. More than 50% of 40 minute visit spent counseling and coordinating care with patient face to face on blood pressure, Norvasc, blood pressure medications, diet, exercise, risk of elevated blood pressure. Follow up with liver specialist as scheduled. After care summary printed and reviewed with patient. Plan reviewed with patient. Questions answered.  Patient verbalized understanding of plan and is in agreement with plan. Patient to follow up in three montsh with his PCP or earlier if symptoms worsen.      MASON AlexC

## 2018-01-22 NOTE — PROGRESS NOTES
Chief Complaint   Patient presents with    Hepatitis C    Cholesterol Problem     high chol    Hypertension    Other     ichthyosis       There are no preventive care reminders to display for this patient. 1. Have you been to the ER, urgent care clinic since your last visit? Hospitalized since your last visit? No    2. Have you seen or consulted any other health care providers outside of the [address] [place] [address] since your last visit? Include any pap smears or colon screening.  No

## 2018-02-12 ENCOUNTER — OFFICE VISIT (OUTPATIENT)
Dept: HEMATOLOGY | Age: 63
End: 2018-02-12

## 2018-02-12 ENCOUNTER — HOSPITAL ENCOUNTER (OUTPATIENT)
Dept: LAB | Age: 63
Discharge: HOME | End: 2018-02-12

## 2018-02-12 VITALS
DIASTOLIC BLOOD PRESSURE: 93 MMHG | TEMPERATURE: 98.4 F | BODY MASS INDEX: 26.84 KG/M2 | SYSTOLIC BLOOD PRESSURE: 148 MMHG | HEIGHT: 67 IN | HEART RATE: 86 BPM | OXYGEN SATURATION: 98 % | WEIGHT: 171 LBS | RESPIRATION RATE: 14 BRPM

## 2018-02-12 DIAGNOSIS — B18.2 CHRONIC HEPATITIS C WITHOUT HEPATIC COMA (HCC): Primary | ICD-10-CM

## 2018-02-12 DIAGNOSIS — B18.2 CHRONIC HEPATITIS C WITHOUT HEPATIC COMA (HCC): ICD-10-CM

## 2018-02-12 LAB — SENTARA SPECIMEN COL,SENBCF: NORMAL

## 2018-02-12 PROCEDURE — 99001 SPECIMEN HANDLING PT-LAB: CPT | Performed by: NURSE PRACTITIONER

## 2018-02-12 PROCEDURE — 99000 SPECIMEN HANDLING OFFICE-LAB: CPT | Performed by: INTERNAL MEDICINE

## 2018-02-12 RX ORDER — AMLODIPINE BESYLATE 5 MG/1
TABLET ORAL
Refills: 0 | COMMUNITY
Start: 2017-11-21 | End: 2018-06-14 | Stop reason: DRUGHIGH

## 2018-02-12 NOTE — PROGRESS NOTES
[address] MD Adrián, [address], [place], [place]       Yesenia Karan, ALAN    [facility], PADaniellaC    Dennise Winn, PRISCILLAP-BC   ALAN Brandon NP        at [address], [address] [address]     [phone]     FAX: [phone]    at [address], [address]     [phone]     FAX: [phone]         Patient Care Team:  David Jaquez MD as PCP - General (Family Practice)  Stephanie Guzman MD (Gastroenterology)      Problem List  Date Reviewed: 2/12/2018          Codes Class Noted    Chronic hepatitis C without hepatic coma [facility]) ICD-10-CM: B18.2  ICD-9-CM: 070.54  8/3/2015        Hydrocele, right ICD-10-CM: N43.3  ICD-9-CM: 603.9  8/3/2015        BPH (benign prostatic hypertrophy) ICD-10-CM: N40.0  ICD-9-CM: 600.00  10/15/2012        Hyperlipidemia ICD-10-CM: E78.5  ICD-9-CM: 272.4  9/30/2011        HTN (hypertension) ICD-10-CM: I10  ICD-9-CM: 401.9  9/30/2011        ED (erectile dysfunction) ICD-10-CM: N52.9  ICD-9-CM: 607.84  9/30/2011                Noel Pillai returns to the [facility] today for education and management of chronic HCV. The patient is here for post treatment week #12 of the Salvage clinical trial.      The active problem list, all pertinent past medical history, medications, liver histology, endoscopic studies, radiologic findings and laboratory findings related to the liver disorder were reviewed with the patient. The patient is a 57 y.o. Black male who was noted to have abnormalities in liver chemistries and subsequently tested positive for chronic HCV in the  2000s. Risk factors for acquiring HCV are IV drug use, inhaling cocaine, tattoos, and mass vaccination after enlisting in the [facility] all between 3478-6179.   There was an episode of acute incteric hepatitis at the time of these risk factors. Ultrasound of the liver was performed in 4/2017. The results of the imaging demonstrated a normal appearing liver. An assessment of liver fibrosis with Fibrosure was 0.43 consistent with stage F2. The patient was treated with Derald Collet from 4-6/2017. The patient was treated for 8 weeks. The patient tolerated treatment reasonably well. HCV RNA levels obtained during treatment demonstrate a response followed by relapse. The most recent laboratory studies indicate that the liver transaminases are normal, alkaline phosphatase is normal, tests of hepatic synthetic and metabolic function are normal, and the platelet count is normal.      The patient has no symptoms which can be attributed to the liver disorder. The patient completes all daily activities without any functional limitations. The patient has not experienced fatigue, fevers, chills, shortness of breath, chest pain, pain in the right side over the liver, diffuse abdominal pain, nausea, vomiting, constipation, diarrhrea, dry eyes, dry mouth, arthralgias, myalgias, yellowing of the eyes or skin, itching, dark urine, problems concentrating, swelling of the abdomen, swelling of the lower extremities, hematemesis, or hematochezia. ALLERGIES  Allergies   Allergen Reactions    Sean [Amlodipine-Olmesartan] Palpitations and Other (comments)     Fatigue, headache, pt states \"kidneys hurt\"       MEDICATIONS  Current Outpatient Prescriptions   Medication Sig    urea (CARMOL) 40 % topical cream Apply  to affected area two (2) times a day.  lisinopril (PRINIVIL, ZESTRIL) 40 mg tablet take 1 tablet by mouth once daily    hydroCHLOROthiazide (HYDRODIURIL) 25 mg tablet take 1 tablet by mouth once daily    triamcinolone acetonide (KENALOG) 0.1 % topical cream Apply  to affected area two (2) times a day. use thin layer    aspirin 81 mg tablet Take 81 mg by mouth daily.  MULTIVITAMIN PO Take  by mouth daily.     tadalafil (CIALIS) 10 mg tablet Take 1 Tab by mouth daily as needed. No current facility-administered medications for this visit. SYSTEM REVIEW NOT RELATED TO LIVER DISEASE OR REVIEWED ABOVE:  Constitution systems: Negative for fever, chills, weight gain, weight loss. Eyes: Negative for visual changes. ENT: Negative for sore throat, painful swallowing. Respiratory: Negative for cough, hemoptysis, SOB. Cardiology: Negative for chest pain, palpitations. GI:  Negative for constipation or diarrhea. : Negative for urinary frequency, dysuria, hematuria, nocturia. Skin: Negative for rash. Hematology: Negative for easy bruising, blood clots. Musculo-skelatal: Negative for back pain, muscle pain, weakness. Neurologic: Negative for headaches, dizziness, vertigo, memory problems not related to HE. Psychology: Negative for anxiety, depression. FAMILY HISTORY:  The father  of alcoholism. The mother has the following chronic diseases: dementia. There is no family history of liver disease. SOCIAL HISTORY:  The patient is . The spouse has been tested for HCV and is negative. The patient has 3 children, 1 stepchildren, 1 adopted and 12 grandchildren. The patient stopped using tobacco products in 1970s. The patient consumes 1 alcoholic beverages per day. The patient currently works full time [employer]. PHYSICAL EXAMINATION:  Visit Vitals    BP (!) 148/93 (BP 1 Location: Left arm, BP Patient Position: Sitting)    Pulse 86    Temp 98.4 °F (36.9 °C) (Temporal)    Resp 14    Ht 5' 7\" (1.702 m)    Wt 171 lb (77.6 kg)    SpO2 98%    BMI 26.78 kg/m2     General: No acute distress. Eyes: Sclera anicteric. ENT: No oral lesions. Thyroid normal.  Nodes: No adenopathy. Skin: No spider angiomata. No jaundice. No palmar erythema. Respiratory: Lungs clear to auscultation. Cardiovascular: Regular heart rate. No murmurs. No JVD. Abdomen: Soft non-tender.   Liver size normal to percussion/palpation. Spleen not palpable. No obvious ascites. Extremities: No edema. No muscle wasting. No gross arthritic changes. Neurologic: Alert and oriented. Cranial nerves grossly intact. No asterixis. LABORATORY STUDIES:  [address] Units 11/20/2017 10/20/2017   WBC 4.0 - 11.0 K/uL     ANC 1.8 - 7.7 K/uL     HGB 13.1 - 17.2 g/dL      - 440 K/uL     AST 15 - 37 U/L 25 28   ALT 16 - 61 U/L 27 18   Alk Phos 45 - 117 U/L 83 77   Bili, Total 0.2 - 1.0 MG/DL 0.4 0.3   Bili, Direct 0.0 - 0.2 MG/DL 0.1    Albumin 3.4 - 5.0 g/dL 3.9 4.6   BUN 6 - 22 mg/dL  17   Creat 0.8 - 1.6 mg/dL  1.2   Na 133 - 145 mmol/L  138   K 3.5 - 5.5 mmol/L  4.5   Cl 98 - 110 mmol/L  95 (L)   CO2 20 - 32 mmol/L  24   Glucose 70 - 99 mg/dL  92     Liver [facility] Latest Ref Rng & Units 9/28/2017   WBC 4.0 - 11.0 K/uL 7.7   ANC 1.8 - 7.7 K/uL 3.3   HGB 13.1 - 17.2 g/dL 15.2    - 440 K/uL 277   AST 15 - 37 U/L 23   ALT 16 - 61 U/L 16   Alk Phos 45 - 117 U/L 68   Bili, Total 0.2 - 1.0 MG/DL 0.2   Bili, Direct 0.0 - 0.2 MG/DL <0.2   Albumin 3.4 - 5.0 g/dL 4.6   BUN 6 - 22 mg/dL 19   Creat 0.8 - 1.6 mg/dL 1.3   Na 133 - 145 mmol/L 141   K 3.5 - 5.5 mmol/L 4.0   Cl 98 - 110 mmol/L 95 (L)   CO2 20 - 32 mmol/L 17 (L)   Glucose 70 - 99 mg/dL 78     SEROLOGIES:  1/2015. HCV RNA Log 6.5 IU/ml,   10/2016. HAV total positive, HBsAntigen negative, anti-HBcore negative, anti-HBsurface negative, HCV Geneotype 1A, anti-HIV negative  12/2016. HCV RNA undetectable  1/2017. HCV RNA undetectable  4/2017. HCV RNA Log 6.06 IU/ml,   11/2017. HCV RNA not detected. LIVER HISTOLOGY:  3/2015. HCV Fibrosure 0.43. Consistent with stage 2 fibrosis. 4/2017. HCV Fibrosure 0.55. Consistent with stage 2 fibrosis. ENDOSCOPIC PROCEDURES:  3/2015. Colonoscopy by Dr Ivory Ye. No polyps    RADIOLOGY:  4/2017. Ultrasound of liver. Normal appearing liver. No liver mass lesions.     OTHER TESTING:  Not available or performed    ASSESSMENT AND PLAN:  Chronic HCV with stage 2 fibrosis by Fibrosure. The most recent laboratory studies indicate that the liver transaminases are normal, alkaline phosphatase is normal, tests of hepatic synthetic and metabolic function are normal, and the platelet count is normal. Labs per study protocol. The patient was previously treated for HCV with Darra Heaton. There was a response then relapse. The patient has HCV genotype 1A. The patient is enrolled [facility]-Salvage  clinical trial for treatment experienced patients previously exposed to NS5A inhibitor + SOF. He was in the 12 week arm for non-cirrhotics. This is post-treatment week 12. He had no treatment related complaints. No HCV RNA detected at end of treatment visit. The patient was directed to continue all current medications at the current dosages. There are no contraindications for the patient to take any medications that are necessary for treatment of other medical issues. The patient was counseled regarding alcohol consumption. Vaccination for viral hepatitis A is not required. The patient has serologic evidence of prior exposure or vaccination with immunity. Vaccination for viral hepatitis B is recommended. The patient does not have serologic evidence of prior exposure or vaccination with immunity. All of the above issues were discussed with the patient. All questions were answered. The patient expressed a clear understanding of the above. [facility] [place] [address] in 9 months to assess for SVR one year after completing HCV treatment.         Андрей Cm NP   Liver [place] of [address] [place] [address], [address] [place] [address] Inez Canela, [address] [facility]   [phone]

## 2018-02-12 NOTE — MR AVS SNAPSHOT
[address] [address] 
[phone] Patient: Adi Hammer MRN: [id number] JVK:[birth year] Visit Information Date & Time Provider Department Dept. Phone Encounter #  
 2/12/2018  1:00 PM ALAN Lundberg [address] of  [address] [id number] Follow-up Instructions Return in about 9 months (around 11/12/2018). Your Appointments 4/23/2018 11:30 AM  
Follow Up with Martha Tse NP [address] (--) Appt Note: stephanie Carter [place] [id number] [phone]  
  
   
 Alysa Carter [facility] [id number] Upcoming Health Maintenance Date Due COLONOSCOPY 3/12/2020 DTaP/Tdap/Td series (2 - Td) 10/2/2025 Allergies as of 2/12/2018  Review Complete On: 2/12/2018 By: Hoda Luo Severity Noted Reaction Type Reactions Sean [Amlodipine-olmesartan] High 08/15/2012    Palpitations, Other (comments) Fatigue, headache, pt states \"kidneys hurt\" Current Immunizations  Reviewed on 1/27/2017 Name Date Influenza Vaccine (Quad) PF 11/3/2017, 9/29/2016 Tdap 10/2/2015 Not reviewed this visit You Were Diagnosed With   
  
 Codes Comments Chronic hepatitis C without hepatic coma (HCC)    -  Primary ICD-10-CM: B18.2 ICD-9-CM: 070.54 Vitals BP Pulse Temp Resp Height(growth percentile) (!) 148/93 (BP 1 Location: Left arm, BP Patient Position: Sitting) 86 98.4 °F (36.9 °C) (Temporal) 14 5' 7\" (1.702 m) Weight(growth percentile) SpO2 BMI Smoking Status 171 lb (77.6 kg) 98% 26.78 kg/m2 Former Smoker Vitals History BMI and BSA Data Body Mass Index Body Surface Area  
 26.78 kg/m 2 1.92 m 2 Preferred Pharmacy Pharmacy Name Phone [phone] [address] [facility], [address] [facility] [phone] Your Updated Medication List  
  
   
This list is accurate as of: 2/12/18  1:43 PM.  Always use your most recent med list. amLODIPine 5 mg tablet Commonly known as:  NORVASC  
take 1 tablet by mouth twice a day  
  
 aspirin 81 mg tablet Take 81 mg by mouth daily. hydroCHLOROthiazide 25 mg tablet Commonly known as:  HYDRODIURIL  
take 1 tablet by mouth once daily  
  
 lisinopril 40 mg tablet Commonly known as:  PRINIVIL, ZESTRIL  
take 1 tablet by mouth once daily MULTIVITAMIN PO Take  by mouth daily. tadalafil 10 mg tablet Commonly known as:  CIALIS Take 1 Tab by mouth daily as needed. triamcinolone acetonide 0.1 % topical cream  
Commonly known as:  KENALOG Apply  to affected area two (2) times a day. use thin layer  
  
 urea 40 % topical cream  
Commonly known as:  CARMOL Apply  to affected area two (2) times a day. Follow-up Instructions Return in about 9 months (around 11/12/2018). To-Do List   
 02/12/2018 Lab:  CBC WITH AUTOMATED DIFF   
  
 02/12/2018 Lab:  HCV RT-PCR, QUANT (NON-GRAPH)   
  
 02/12/2018 Lab:  HEPATIC FUNCTION PANEL   
  
 02/12/2018 Lab:  METABOLIC PANEL, BASIC Introducing [facility]! Dear Yovany Wu: Thank you for requesting a [employer] account. Our records indicate that you have previously registered for a [employer] account but its currently inactive. Please call our [employer] support line at [phone]. Additional Information If you have questions, please visit the Frequently Asked Questions section of the [employer] website at [email or web address] Remember, [employer] is NOT to be used for urgent needs. For medical emergencies, dial 911. Now available from your iPhone and Android! Please provide this summary of care documentation to your next provider. Your primary care clinician is listed as AUGUSTINE FERRARI.  If you have any questions after today's visit, please call [phone].

## 2018-02-12 NOTE — PROGRESS NOTES
1. Have you been to the ER, urgent care clinic since your last visit? Hospitalized since your last visit? No    2. Have you seen or consulted any other health care providers outside of the [address] [place] [address] since your last visit? Include any pap smears or colon screening.  No     Chief Complaint   Patient presents with    Follow-up

## 2018-02-13 LAB
A-G RATIO,AGRAT: 1.4 RATIO (ref 1.1–2.6)
ABSOLUTE LYMPHOCYTE COUNT, 10803: 2.4 K/UL (ref 1–4.8)
ALBUMIN SERPL-MCNC: 4.3 G/DL (ref 3.5–5)
ALP SERPL-CCNC: 62 U/L (ref 40–125)
ALT SERPL-CCNC: 17 U/L (ref 5–40)
ANION GAP SERPL CALC-SCNC: 12 MMOL/L
AST SERPL W P-5'-P-CCNC: 26 U/L (ref 10–37)
BASOPHILS # BLD: 0 K/UL (ref 0–0.2)
BASOPHILS NFR BLD: 0 % (ref 0–2)
BILIRUB SERPL-MCNC: 0.3 MG/DL (ref 0.2–1.2)
BILIRUBIN, DIRECT,CBIL: <0.2 MG/DL (ref 0–0.3)
BUN SERPL-MCNC: 12 MG/DL (ref 6–22)
CALCIUM SERPL-MCNC: 9.8 MG/DL (ref 8.4–10.4)
CHLORIDE SERPL-SCNC: 98 MMOL/L (ref 98–110)
CO2 SERPL-SCNC: 28 MMOL/L (ref 20–32)
CREAT SERPL-MCNC: 1.1 MG/DL (ref 0.8–1.6)
EOSINOPHIL # BLD: 0.1 K/UL (ref 0–0.5)
EOSINOPHIL NFR BLD: 2 % (ref 0–6)
ERYTHROCYTE [DISTWIDTH] IN BLOOD BY AUTOMATED COUNT: 12.6 % (ref 10–16)
GFRAA, 66117: >60
GFRNA, 66118: >60
GLOBULIN,GLOB: 3 G/DL (ref 2–4)
GLUCOSE SERPL-MCNC: 131 MG/DL (ref 70–99)
GRANULOCYTES,GRANS: 44 % (ref 40–75)
HCT VFR BLD AUTO: 45.8 % (ref 39.3–51.6)
HGB BLD-MCNC: 15.6 G/DL (ref 13.1–17.2)
LYMPHOCYTES, LYMLT: 47 % (ref 27–45)
MCH RBC QN AUTO: 32 PG (ref 26–34)
MCHC RBC AUTO-ENTMCNC: 34 G/DL (ref 32–36)
MCV RBC AUTO: 92 FL (ref 80–95)
MONOCYTES # BLD: 0.4 K/UL (ref 0.1–0.9)
MONOCYTES NFR BLD: 7 % (ref 3–9)
NEUTROPHILS # BLD AUTO: 2.2 K/UL (ref 1.8–7.7)
PLATELET # BLD AUTO: 266 K/UL (ref 140–440)
PMV BLD AUTO: 11.9 FL (ref 6–10.8)
POTASSIUM SERPL-SCNC: 3.9 MMOL/L (ref 3.5–5.5)
PROT SERPL-MCNC: 7.3 G/DL (ref 6.2–8.1)
RBC # BLD AUTO: 4.96 M/UL (ref 3.8–5.8)
SODIUM SERPL-SCNC: 138 MMOL/L (ref 133–145)
WBC # BLD AUTO: 5.1 K/UL (ref 4–11)

## 2018-02-14 LAB
HCV PCR LOG10, 20021: <1.18 {LOG_IU}/ML
HEPATITIS C RNA-PCR, 550401: <15 IU/ML

## 2018-04-15 DIAGNOSIS — I10 ESSENTIAL HYPERTENSION: ICD-10-CM

## 2018-04-17 RX ORDER — LISINOPRIL 40 MG/1
TABLET ORAL
Qty: 90 TAB | Refills: 0 | Status: SHIPPED | OUTPATIENT
Start: 2018-04-17 | End: 2018-06-14 | Stop reason: SDUPTHER

## 2018-05-08 ENCOUNTER — TELEPHONE (OUTPATIENT)
Dept: FAMILY MEDICINE CLINIC | Age: 63
End: 2018-05-08

## 2018-05-08 DIAGNOSIS — Z20.7 SCABIES EXPOSURE: Primary | ICD-10-CM

## 2018-05-08 RX ORDER — IVERMECTIN 3 MG/1
3 TABLET ORAL ONCE
Qty: 6 TAB | Refills: 0 | Status: SHIPPED | OUTPATIENT
Start: 2018-05-08 | End: 2018-05-24 | Stop reason: SDUPTHER

## 2018-05-08 RX ORDER — IVERMECTIN 10 MG/G
1 CREAM TOPICAL DAILY
Qty: 45 G | Refills: 0 | Status: SHIPPED | OUTPATIENT
Start: 2018-05-08 | End: 2018-05-15

## 2018-05-18 ENCOUNTER — TELEPHONE (OUTPATIENT)
Dept: FAMILY MEDICINE CLINIC | Age: 63
End: 2018-05-18

## 2018-05-18 NOTE — TELEPHONE ENCOUNTER
Medication was never filled for Scabies. Note stated that medication would be called in, but medication was never sent.

## 2018-05-21 NOTE — TELEPHONE ENCOUNTER
Spoke with the pharmacy and the medication was filled and picked up by the patient. The topical RX will need a prior authorization.

## 2018-05-24 ENCOUNTER — TELEPHONE (OUTPATIENT)
Dept: FAMILY MEDICINE CLINIC | Age: 63
End: 2018-05-24

## 2018-05-24 DIAGNOSIS — Z20.7 SCABIES EXPOSURE: Primary | ICD-10-CM

## 2018-05-24 RX ORDER — PERMETHRIN 50 MG/G
CREAM TOPICAL
Qty: 60 G | Refills: 0 | Status: SHIPPED | OUTPATIENT
Start: 2018-05-24 | End: 2018-06-05 | Stop reason: SDUPTHER

## 2018-05-24 RX ORDER — IVERMECTIN 3 MG/1
3 TABLET ORAL ONCE
Qty: 6 TAB | Refills: 0 | Status: SHIPPED | OUTPATIENT
Start: 2018-05-24 | End: 2018-05-24

## 2018-05-25 NOTE — TELEPHONE ENCOUNTER
Provider has clarified the prescribed order and the patient pharmacy has been called to clarify the order as follows: Patient pharmacy is to dispense 6 tabs of the ivermectin and patient is to take all 6 tabs at once to treat. Patient pharmacy read back clarified order and will dispense the medication as prescribed.

## 2018-05-25 NOTE — TELEPHONE ENCOUNTER
Patient pharmacy has been called in reference to message left 5/24/2018 for clarification to the order written for ACTACIN for the patient. Pharmacy stated that 6 tablets were ordered to be dispensed. Pharmacy needs to know if the patient is to take 1 tablet or all 6 tablets. Prescribing provider has been sent a message to clarify the order. Pharmacy to be notified upon clarification being received from provider.

## 2018-05-25 NOTE — TELEPHONE ENCOUNTER
Seth Abrams'ee,   Please let the pharmacy know that it is to be taken all at one time. Thanks so much.     YOVANI

## 2018-06-05 DIAGNOSIS — Z20.7 SCABIES EXPOSURE: ICD-10-CM

## 2018-06-05 RX ORDER — IVERMECTIN 3 MG/1
TABLET ORAL
Qty: 16 TAB | Refills: 0 | Status: SHIPPED | OUTPATIENT
Start: 2018-06-05 | End: 2018-11-02 | Stop reason: ALTCHOICE

## 2018-06-05 RX ORDER — PERMETHRIN 50 MG/G
CREAM TOPICAL
Qty: 60 G | Refills: 0 | Status: SHIPPED | OUTPATIENT
Start: 2018-06-05 | End: 2018-07-07 | Stop reason: SDUPTHER

## 2018-06-05 RX ORDER — IVERMECTIN 5 MG/G
LOTION TOPICAL
Qty: 1 TUBE | Refills: 1 | Status: SHIPPED | OUTPATIENT
Start: 2018-06-05 | End: 2018-11-02 | Stop reason: ALTCHOICE

## 2018-06-14 ENCOUNTER — OFFICE VISIT (OUTPATIENT)
Dept: FAMILY MEDICINE CLINIC | Age: 63
End: 2018-06-14

## 2018-06-14 VITALS
TEMPERATURE: 97.2 F | HEART RATE: 84 BPM | RESPIRATION RATE: 16 BRPM | SYSTOLIC BLOOD PRESSURE: 169 MMHG | WEIGHT: 164 LBS | HEIGHT: 67 IN | DIASTOLIC BLOOD PRESSURE: 77 MMHG | BODY MASS INDEX: 25.74 KG/M2

## 2018-06-14 DIAGNOSIS — E78.5 HYPERLIPIDEMIA, UNSPECIFIED HYPERLIPIDEMIA TYPE: ICD-10-CM

## 2018-06-14 DIAGNOSIS — I10 ESSENTIAL HYPERTENSION: Primary | ICD-10-CM

## 2018-06-14 DIAGNOSIS — N52.9 ERECTILE DYSFUNCTION, UNSPECIFIED ERECTILE DYSFUNCTION TYPE: ICD-10-CM

## 2018-06-14 DIAGNOSIS — B18.2 CHRONIC HEPATITIS C WITHOUT HEPATIC COMA (HCC): ICD-10-CM

## 2018-06-14 RX ORDER — LISINOPRIL 40 MG/1
TABLET ORAL
Qty: 90 TAB | Refills: 3 | Status: SHIPPED | OUTPATIENT
Start: 2018-06-14 | End: 2019-06-17 | Stop reason: SDUPTHER

## 2018-06-14 RX ORDER — AMLODIPINE BESYLATE 10 MG/1
10 TABLET ORAL EVERY EVENING
Qty: 90 TAB | Refills: 3 | Status: SHIPPED | OUTPATIENT
Start: 2018-06-14 | End: 2018-11-02 | Stop reason: ALTCHOICE

## 2018-06-14 RX ORDER — TADALAFIL 10 MG/1
10 TABLET ORAL
Qty: 4 TAB | Refills: 3 | Status: SHIPPED | OUTPATIENT
Start: 2018-06-14 | End: 2022-10-04

## 2018-06-14 NOTE — PROGRESS NOTES
Progress Note    Patient: Alycia Cuello MRN: [id number]  SSN: [id number]    YOB: 1955  Age: 70 y.o. Sex: male          Subjective:   Alycia Cuello is a 70 y.o. male who is here for follow up. The patient has been dealing with a scabies infestation in his house. He states that he needs refills on some of his blood pressure medications. He was previously was told to take 2 of the 5 mg Amlodipine. The patient mentions that he has been following with a hepatologist. He mentions that his insurance would not cover Theador Hence and was later switched to the Mono protocol. Objective:     Past Medical History:   Diagnosis Date    BPH (benign prostatic hypertrophy) 10/15/2012    ED (erectile dysfunction) 9/30/2011    Hepatitis C     HTN (hypertension) 9/30/2011    Hyperlipidemia 9/30/2011        Vitals:    06/14/18 1141   BP: 169/77   Pulse: 84   Resp: 16   Temp: 97.2 °F (36.2 °C)   TempSrc: Oral   Weight: 164 lb (74.4 kg)   Height: 5' 7\" (1.702 m)      Review of Systems:  Pertinent items are noted in the History of Present Illness. Physical Exam:   GENERAL: alert, cooperative, no distress, appears stated age  EYE: conjunctivae/corneas clear. PERRL, EOM's intact. Fundi benign  LYMPHATIC: Cervical, supraclavicular, and axillary nodes normal.   THROAT & NECK: normal and no erythema or exudates noted. LUNG: clear to auscultation bilaterally  HEART: regular rate and rhythm, S1, S2 normal, no murmur, click, rub or gallop  ABDOMEN: soft, non-tender. Bowel sounds normal. No masses,  no organomegaly  EXTREMITIES:  extremities normal, atraumatic, no cyanosis or edema  SKIN: Normal.  NEUROLOGIC: negative    Lab/Data Review:    Component Value Flag Ref Range Units Status   Hepatitis C Quantitation HCV Not Detected   IU/mL Final   Serial monitoring Comment     Final   Comment:   (NOTE)   The quantitative range of this assay is 15 IU/mL to 100 million   IU/mL.    Performed At: [facility] [address] [id number]   Savana Alba MD [id number]      HCV Graph No graph provided     Final           Lab Results   Component Value Date/Time    Hep B surface Ag screen Negative 03/20/2013 12:00 AM    Hep B Core Ab, IgM Negative 03/20/2013 12:00 AM    Hepatitis A Ab, IgM Negative 03/20/2013 12:00 AM    Hep C Virus Ab >11.0 (H) 03/20/2013 12:00 AM           Labs ordered as noted below. Assessment:     1.) Essential Hypertension: Patient advised to take amlodipine in the evening as well as lisinopril in the morning. The amlodipine was increased to 10 mg.     2.) Erectile Dysfunction: Patient ordered sildenafil for use as needed. 3.) Hyperlipidemia: Lipid panel ordered. 4.) History of Chronic Hepatitis C: Patient has cleared the virus. 5.) Preventive: Labs ordered as noted below. Patient will return in 1 month for follow up on blood pressure.      Plan:     Orders Placed This Encounter    CBC WITH AUTOMATED DIFF    METABOLIC PANEL, COMPREHENSIVE    LIPID PANEL    VITAMIN D, 25 HYDROXY    HEMOGLOBIN A1C WITH EAG    lisinopril (PRINIVIL, ZESTRIL) 40 mg tablet    amLODIPine (NORVASC) 10 mg tablet    tadalafil (CIALIS) 10 mg tablet         Signed By: [id number] Дмитрий Turner,      June 14, 2018

## 2018-06-14 NOTE — PROGRESS NOTES
Chief Complaint   Patient presents with    Hypertension    Cholesterol Problem     1. Have you been to the ER, urgent care clinic since your last visit? Hospitalized since your last visit? No    2. Have you seen or consulted any other health care providers outside of the [address] [place] [address] since your last visit? Include any pap smears or colon screening.  No

## 2018-06-14 NOTE — PATIENT INSTRUCTIONS
Please contact our office if you have any questions about your visit today. 1.) Take Amlodipine in the evening, closer to bed time anywhere between 20 minutes to an hour before you go to bed. This dose has been increased to 10 mg.    2.) Take Lisinopril in the morning. 3.) Return in 1 month for follow up on how you are doing on your blood pressures. Please get labs before next visit.

## 2018-06-14 NOTE — MR AVS SNAPSHOT
[address] [facility], [place], operated by [facility] 
 
 
 [address] Alfredo Pruitt [address] 
[phone] Patient: Jerod Tyson MRN: [id number]:[id number]/[id number] Visit Information Date & Time Provider Department Dept. Phone Encounter #  
 6/14/2018 11:15 AM David Carlson Maribell Nickolas [address] 77 [id number] Follow-up Instructions Return in about 1 month (around 7/14/2018) for Follow Up . Your Appointments 11/12/2018  1:00 PM  
[place] [address] [facility] ([address]) Appt Note: 9mnth f/up [address], [address] [address] [place] [address]  
  
   
 [address] [facility] [address], [facility] Upcoming Health Maintenance Date Due  
 GLAUCOMA SCREENING Q2Y 5/24/2018 Pneumococcal 65+ Low/Medium Risk (1 of 2 - PCV13) 5/24/2018 Influenza Age 5 to Adult 8/1/2018 COLONOSCOPY 3/12/2020 DTaP/Tdap/Td series (2 - Td) 10/2/2025 Allergies as of 6/14/2018  Review Complete On: 6/14/2018 By: David Carlson DO Severity Noted Reaction Type Reactions Sean [Amlodipine-olmesartan] High 08/15/2012    Palpitations, Other (comments) Fatigue, headache, pt states \"kidneys hurt\" Current Immunizations  Reviewed on 1/27/2017 Name Date Influenza Vaccine (Quad) PF 11/3/2017, 9/29/2016 Tdap 10/2/2015 Not reviewed this visit You Were Diagnosed With   
  
 Codes Comments Hyperlipidemia, unspecified hyperlipidemia type    -  Primary ICD-10-CM: E78.5 ICD-9-CM: 272.4 Essential hypertension     ICD-10-CM: I10 
ICD-9-CM: 401.9 Chronic hepatitis C without hepatic coma (HCC)     ICD-10-CM: B18.2 ICD-9-CM: 070.54 Erectile dysfunction, unspecified erectile dysfunction type     ICD-10-CM: N52.9 ICD-9-CM: 607.84 Vitals BP Pulse Temp Resp Height(growth percentile) Weight(growth percentile) 169/77 (BP 1 Location: Right arm, BP Patient Position: Sitting) 84 97.2 °F (36.2 °C) (Oral) 16 5' 7\" (1.702 m) 164 lb (74.4 kg) BMI Smoking Status 25.69 kg/m2 Former Smoker BMI and BSA Data Body Mass Index Body Surface Area  
 25.69 kg/m 2 1.88 m 2 Preferred Pharmacy Pharmacy Name Phone [facility]. [facility], [address] [facility] [phone] Your Updated Medication List  
  
   
This list is accurate as of 6/14/18 12:23 PM.  Always use your most recent med list. amLODIPine 10 mg tablet Commonly known as:  [place] R[place] Take 1 Tab by mouth every evening. aspirin 81 mg tablet Take 81 mg by mouth daily. hydroCHLOROthiazide 25 mg tablet Commonly known as:  HYDRODIURIL  
take 1 tablet by mouth once daily * ivermectin 0.5 % Lotn Commonly known as:  SKLICE Apply one time to affected area. * ivermectin 3 mg tablet Commonly known as:  STROMECTOL Take 8 tabs for one dose and then a second dose in two weeks  
  
 lisinopril 40 mg tablet Commonly known as:  PRINIVIL, ZESTRIL  
take 1 tablet by mouth once daily MULTIVITAMIN PO Take  by mouth daily. permethrin 5 % topical cream  
Commonly known as:  ACTICIN  
apply sparingly as directed  
  
 tadalafil 10 mg tablet Commonly known as:  CIALIS Take 1 Tab by mouth daily as needed. triamcinolone acetonide 0.1 % topical cream  
Commonly known as:  KENALOG Apply  to affected area two (2) times a day. use thin layer  
  
 urea 40 % topical cream  
Commonly known as:  CARMOL Apply  to affected area two (2) times a day. * Notice: This list has 2 medication(s) that are the same as other medications prescribed for you. Read the directions carefully, and ask your doctor or other care provider to review them with you. Prescriptions Sent to Pharmacy  Refills  
 lisinopril (PRINIVIL, ZESTRIL) 40 mg tablet 3  
 Sig: take 1 tablet by mouth once daily Class: Normal  
 Pharmacy: [id number]. [place], [address] [facility]. Ph #: [phone]  
 amLODIPine (NORVASC) 10 mg tablet 3 Sig: Take 1 Tab by mouth every evening. Class: Normal  
 [id number] [place], [address] [facility]. Ph #: [phone] Route: Oral  
 tadalafil (CIALIS) 10 mg tablet 3 Sig: Take 1 Tab by mouth daily as needed. Class: Normal  
 Pharmacy: [id number]. [place], [address] [facility]. Ph #: [phone] Route: Oral  
  
Follow-up Instructions Return in about 1 month (around 7/14/2018) for Follow Up . To-Do List   
 06/14/2018 Lab:  HEMOGLOBIN A1C WITH EAG   
  
 06/14/2018 Lab:  VITAMIN D, 25 HYDROXY Around 09/12/2018 Lab:  CBC WITH AUTOMATED DIFF Around 09/12/2018 Lab:  LIPID PANEL Around 09/12/2018 Lab:  METABOLIC PANEL, COMPREHENSIVE Patient Instructions Please contact our office if you have any questions about your visit today. 1.) Take Amlodipine in the evening, closer to bed time anywhere between 20 minutes to an hour before you go to bed. This dose has been increased to 10 mg. 
 
2.) Take Lisinopril in the morning. 3.) Return in 1 month for follow up on how you are doing on your blood pressures. Please get labs before next visit. Introducing [facility] & HEALTH SERVICES! Dear Jeff Zambrano: Thank you for requesting a Tastem[employer] account. Our records indicate that you have previously registered for a Tastemade account but its currently inactive. Please call our T[employer]emade support line at [phone]. Additional Information If you have questions, please visit the Frequently Asked Questions section of the Tastemade website at [email or web address] Remember, Tastemade is NOT to be used for urgent needs. For medical emergencies, dial 911. Now available from your iPhone and Android! Please provide this summary of care documentation to your next provider. Your primary care clinician is listed as AUGUSTINE FERRARI. If you have any questions after today's visit, please call [phone].

## 2018-07-11 ENCOUNTER — HOSPITAL ENCOUNTER (OUTPATIENT)
Dept: LAB | Age: 63
Discharge: HOME | End: 2018-07-11

## 2018-07-11 LAB — SENTARA SPECIMEN COL,SENBCF: NORMAL

## 2018-07-11 PROCEDURE — 99001 SPECIMEN HANDLING PT-LAB: CPT | Performed by: INTERNAL MEDICINE

## 2018-07-12 LAB
25(OH)D3 SERPL-MCNC: 40.1 NG/ML (ref 32–100)
A-G RATIO,AGRAT: 1.4 RATIO (ref 1.1–2.6)
ABSOLUTE LYMPHOCYTE COUNT, 10803: 3.4 K/UL (ref 1–4.8)
ALBUMIN SERPL-MCNC: 4.2 G/DL (ref 3.5–5)
ALP SERPL-CCNC: 62 U/L (ref 40–125)
ALT SERPL-CCNC: 14 U/L (ref 5–40)
ANION GAP SERPL CALC-SCNC: 15 MMOL/L
AST SERPL W P-5'-P-CCNC: 19 U/L (ref 10–37)
AVG GLU, 10930: 117 MG/DL (ref 91–123)
BASOPHILS # BLD: 0 K/UL (ref 0–0.2)
BASOPHILS NFR BLD: 0 % (ref 0–2)
BILIRUB SERPL-MCNC: 0.3 MG/DL (ref 0.2–1.2)
BUN SERPL-MCNC: 14 MG/DL (ref 6–22)
CALCIUM SERPL-MCNC: 9.1 MG/DL (ref 8.4–10.4)
CHLORIDE SERPL-SCNC: 102 MMOL/L (ref 98–110)
CHOLEST SERPL-MCNC: 193 MG/DL (ref 110–200)
CO2 SERPL-SCNC: 26 MMOL/L (ref 20–32)
CREAT SERPL-MCNC: 1 MG/DL (ref 0.8–1.6)
EOSINOPHIL # BLD: 0.1 K/UL (ref 0–0.5)
EOSINOPHIL NFR BLD: 1 % (ref 0–6)
ERYTHROCYTE [DISTWIDTH] IN BLOOD BY AUTOMATED COUNT: 13.3 % (ref 10–15.5)
GFRAA, 66117: >60
GFRNA, 66118: >60
GLOBULIN,GLOB: 2.9 G/DL (ref 2–4)
GLUCOSE SERPL-MCNC: 98 MG/DL (ref 70–99)
GRANULOCYTES,GRANS: 49 % (ref 40–75)
HBA1C MFR BLD HPLC: 5.7 % (ref 4.8–5.9)
HCT VFR BLD AUTO: 45.5 % (ref 37.8–52.2)
HDLC SERPL-MCNC: 4.3 MG/DL (ref 0–5)
HDLC SERPL-MCNC: 45 MG/DL (ref 40–59)
HGB BLD-MCNC: 14.9 G/DL (ref 12.6–17.1)
LDLC SERPL CALC-MCNC: 134 MG/DL (ref 50–99)
LYMPHOCYTES, LYMLT: 43 % (ref 20–45)
MCH RBC QN AUTO: 31 PG (ref 26–34)
MCHC RBC AUTO-ENTMCNC: 33 G/DL (ref 31–36)
MCV RBC AUTO: 93 FL (ref 80–95)
MONOCYTES # BLD: 0.6 K/UL (ref 0.1–1)
MONOCYTES NFR BLD: 7 % (ref 3–12)
NEUTROPHILS # BLD AUTO: 3.9 K/UL (ref 1.8–7.7)
PLATELET # BLD AUTO: 264 K/UL (ref 140–440)
PMV BLD AUTO: 11.6 FL (ref 9–13)
POTASSIUM SERPL-SCNC: 4.1 MMOL/L (ref 3.5–5.5)
PROT SERPL-MCNC: 7.1 G/DL (ref 6.2–8.1)
RBC # BLD AUTO: 4.87 M/UL (ref 3.8–5.8)
SODIUM SERPL-SCNC: 143 MMOL/L (ref 133–145)
TRIGL SERPL-MCNC: 70 MG/DL (ref 40–149)
VLDLC SERPL CALC-MCNC: 14 MG/DL (ref 8–30)
WBC # BLD AUTO: 8 K/UL (ref 4–11)

## 2018-07-13 ENCOUNTER — OFFICE VISIT (OUTPATIENT)
Dept: FAMILY MEDICINE CLINIC | Age: 63
End: 2018-07-13

## 2018-07-13 VITALS
SYSTOLIC BLOOD PRESSURE: 152 MMHG | HEART RATE: 75 BPM | HEIGHT: 67 IN | DIASTOLIC BLOOD PRESSURE: 78 MMHG | WEIGHT: 165 LBS | RESPIRATION RATE: 16 BRPM | TEMPERATURE: 97.2 F | BODY MASS INDEX: 25.9 KG/M2

## 2018-07-13 DIAGNOSIS — I10 ESSENTIAL HYPERTENSION: Primary | ICD-10-CM

## 2018-07-13 DIAGNOSIS — Z29.9 PREVENTIVE MEASURE: ICD-10-CM

## 2018-07-13 DIAGNOSIS — R10.12 LEFT UPPER QUADRANT PAIN: ICD-10-CM

## 2018-07-13 DIAGNOSIS — E78.5 HYPERLIPIDEMIA, UNSPECIFIED HYPERLIPIDEMIA TYPE: ICD-10-CM

## 2018-07-13 NOTE — PATIENT INSTRUCTIONS
Please contact our office if you have any questions about your visit today. 1.) Please take a half a tab of the amlodipine in the morning and the other half in the evening. Continue on the lisinopril and hydrochlorothiazide. 2.) Check blood pressures at least three times a week. Goal is for average blood pressure is less than 140/90.    3.) Please call if your blood pressures are still not within that average range. 4.) We will call if there are any abnormalities on the x-ray. 5.) Return for regular follow up in 3 months. Learning About Diuretics for High Blood Pressure  Introduction  Diuretics help to lower blood pressure. This reduces your risk of a heart attack and stroke. It also reduces your risk of kidney disease. Diuretics cause your kidneys to remove sodium and water. They also relax the blood vessel walls. These help lower your blood pressure. Examples  · Chlorthalidone  · Hydrochlorothiazide  Possible side effects  There are some common side effects. They are:  · Too little potassium. · Feeling dizzy. · Rash. · Urinating a lot. · High blood sugar. (But this is not common.)  You may have other side effects. Check the information that comes with your medicine. What to know about taking this medicine  · You may take other medicines for blood pressure. Diuretics can help those work better. They can also prevent extra fluid in your body. · You may need to take potassium pills. Or you may have to watch how much potassium is in your food. Ask your doctor about this. · You may need blood tests to check your kidneys and your potassium level. · Take your medicines exactly as prescribed. Call your doctor if you think you are having a problem with your medicine. · Check with your doctor or pharmacist before you use any other medicines. This includes over-the-counter medicines. Make sure your doctor knows all of the medicines, vitamins, herbal products, and supplements you take. Taking some medicines together can cause problems. Where can you learn more? Go to http://magdiel-rom.info/. Enter L948 in the search box to learn more about \"Learning About Diuretics for High Blood Pressure. \"  Current as of: May 10, 2017  Content Version: 11.7  © 1209-1367 [employer], [employer]. Care instructions adapted under license by [employer] (which disclaims liability or warranty for this information). If you have questions about a medical condition or this instruction, always ask your healthcare professional. [address] any warranty or liability for your use of this information.

## 2018-07-13 NOTE — PROGRESS NOTES
Progress Note    Patient: Mary Moffett MRN: [id number]  SSN: [id number]    YOB: 1955  Age: 70 y.o. Sex: male          Subjective:   Mary Moffett is a 70 y.o. male who is here for follow up. The patient mentions that he did develop leg swelling from the increased dose of amlodipine. The patient also mentions that he cuts the amlodipine in half and takes it at the same time as his lisinopril. He also mentions that he has been having severe pain in the left side of the abdomen. He mentions that he has had this pain for about a year./ He states that he does not buckle in pain. He denies any food aggravating this. He mentions that drinking too much water aggravates this. He denies any burning sensation in the left side. Visit from 6/14/18  The patient has been dealing with a scabies infestation in his house. He states that he needs refills on some of his blood pressure medications. He was previously was told to take 2 of the 5 mg Amlodipine. The patient mentions that he has been following with a hepatologist. He mentions that his insurance would not cover [employer] and was later switched to the [place] protocol. Objective:     Past Medical History:   Diagnosis Date    BPH (benign prostatic hypertrophy) 10/15/2012    ED (erectile dysfunction) 9/30/2011    Hepatitis C     HTN (hypertension) 9/30/2011    Hyperlipidemia 9/30/2011        Vitals:    07/13/18 1107   BP: 157/79   Pulse: 75   Resp: 16   Temp: 97.2 °F (36.2 °C)   TempSrc: Oral   Weight: 165 lb (74.8 kg)   Height: 5' 7\" (1.702 m)      Review of Systems:  Pertinent items are noted in the History of Present Illness. Physical Exam:   GENERAL: alert, cooperative, no distress, appears stated age  EYE: conjunctivae/corneas clear. PERRL, EOM's intact. Fundi benign  THROAT & NECK: normal and no erythema or exudates noted.    LUNG: clear to auscultation bilaterally  HEART: regular rate and rhythm, S1, S2 normal, no murmur, click, rub or gallop  ABDOMEN: tenderness in left upper abdominal quadrant. Bowel sounds normal. No masses,  no organomegaly  EXTREMITIES:  extremities normal, atraumatic, no cyanosis or edema  SKIN: Normal.    Lab/Data Review:    Lab Results   Component Value Date/Time    Hemoglobin A1c 5.7 07/11/2018 11:39 AM    Hemoglobin A1c (POC) 5.8 06/16/2016 12:02 PM     Lab Results   Component Value Date/Time    WBC 8.0 07/11/2018 11:39 AM    HGB 14.9 07/11/2018 11:39 AM    HCT 45.5 07/11/2018 11:39 AM    PLATELET 462 58/88/5522 11:39 AM    MCV 93 07/11/2018 11:39 AM     Lab Results   Component Value Date/Time    Sodium 143 07/11/2018 11:39 AM    Potassium 4.1 07/11/2018 11:39 AM    Chloride 102 07/11/2018 11:39 AM    CO2 26 07/11/2018 11:39 AM    Anion gap 15.0 07/11/2018 11:39 AM    Glucose 98 07/11/2018 11:39 AM    BUN 14 07/11/2018 11:39 AM    Creatinine 1.0 07/11/2018 11:39 AM    BUN/Creatinine ratio 16 06/04/2013 12:00 AM    GFR est AA 97 02/20/2012 12:20 PM    GFR est non-AA 80 06/04/2013 12:00 AM    Calcium 9.1 07/11/2018 11:39 AM    Bilirubin, total 0.3 07/11/2018 11:39 AM    AST (SGOT) 19 07/11/2018 11:39 AM    Alk. phosphatase 62 07/11/2018 11:39 AM    Protein, total 7.1 07/11/2018 11:39 AM    Albumin 4.2 07/11/2018 11:39 AM    Globulin 2.9 07/11/2018 11:39 AM    A-G Ratio 1.4 07/11/2018 11:39 AM    ALT (SGPT) 14 07/11/2018 11:39 AM     Lab Results   Component Value Date/Time    Cholesterol, total 193 07/11/2018 11:39 AM    HDL Cholesterol 45 07/11/2018 11:39 AM    LDL, calculated 134 (H) 07/11/2018 11:39 AM    VLDL, calculated 14 07/11/2018 11:39 AM    Triglyceride 70 07/11/2018 11:39 AM         Component Value Flag Ref Range Units Status   Hepatitis C Quantitation HCV Not Detected   IU/mL Final   Serial monitoring Comment     Final   Comment:   (NOTE)   The quantitative range of this assay is 15 IU/mL to 100 million   IU/mL.    Performed At: [address] [id number] Dior Patten MD PO:[id number]      HCV Graph No graph provided     Final           Lab Results   Component Value Date/Time    Hep B surface Ag screen Negative 03/20/2013 12:00 AM    Hep B Core Ab, IgM Negative 03/20/2013 12:00 AM    Hepatitis A Ab, IgM Negative 03/20/2013 12:00 AM    Hep C Virus Ab >11.0 (H) 03/20/2013 12:00 AM         Assessment:     1.) Intermittent Abdominal Pain: Patient ordered plain film x-ray of the abdomen. 2.) Essential Hypertension: Patient advised to cut amlodipine half and to take half in the morning and the other half in the evening. He will continue on lisinopril and hydrochlorothiazide. 3.) Erectile Dysfunction: Patient may use sildenafil for use as needed. 4.) Hyperlipidemia: Lipid panel ordered. 5.) History of Chronic Hepatitis C: Patient has cleared the virus. 6.) Preventive: All labs were reviewed and summarized with the patient. Patient will return in 3 months for follow up on blood pressure.      Plan:     Orders Placed This Encounter    XR ABD (AP AND ERECT OR DECUB)    LIPID PANEL    METABOLIC PANEL, COMPREHENSIVE    CBC WITH AUTOMATED DIFF         Signed By: Moe Hernandez DO     July 13, 2018

## 2018-07-13 NOTE — PROGRESS NOTES
Chief Complaint   Patient presents with    Hypertension    Cholesterol Problem     1. Have you been to the ER, urgent care clinic since your last visit? Hospitalized since your last visit? No    2. Have you seen or consulted any other health care providers outside of the [address] [place] [address] since your last visit? Include any pap smears or colon screening.  No

## 2018-07-13 NOTE — MR AVS SNAPSHOT
[address] [facility] 
 
 
 [address] [address] [address] [address] [phone] Patient: Marylin Garcia MRN: [id number] BKP:5/83/7105 Visit Information Date & Time Provider Department Dept. Phone Encounter #  
 7/13/2018 10:45 AM Maribell Valle 77 [id number] Follow-up Instructions Return in about 3 months (around 10/13/2018) for Regular Follow Up . Your Appointments 11/12/2018  1:00 PM  
[place] [address] [facility] ([address] [facility]) Appt Note: 9mnth f/up [address], [place] [address]  
  
   
 [address], [address] [place] [address] Upcoming Health Maintenance Date Due  
 GLAUCOMA SCREENING Q2Y 5/24/2018 Pneumococcal 65+ Low/Medium Risk (1 of 2 - PCV13) 5/24/2018 Influenza Age 5 to Adult 8/1/2018 COLONOSCOPY 3/12/2020 DTaP/Tdap/Td series (2 - Td) 10/2/2025 Allergies as of 7/13/2018  Review Complete On: 0/56/5245 By: Raul Lamar. Mimi Bui LPN Severity Noted Reaction Type Reactions Sean [Amlodipine-olmesartan] High 08/15/2012    Palpitations, Other (comments) Fatigue, headache, pt states \"kidneys hurt\" Current Immunizations  Reviewed on 1/27/2017 Name Date Influenza Vaccine (Quad) PF 11/3/2017, 9/29/2016 Tdap 10/2/2015 Not reviewed this visit You Were Diagnosed With   
  
 Codes Comments Essential hypertension    -  Primary ICD-10-CM: I10 
ICD-9-CM: 401.9 Hyperlipidemia, unspecified hyperlipidemia type     ICD-10-CM: E78.5 ICD-9-CM: 272.4 Preventive measure     ICD-10-CM: Z29.9 ICD-9-CM: V07.9 Left upper quadrant pain     ICD-10-CM: R10.12 ICD-9-CM: 789.02 Vitals BP Pulse Temp Resp Height(growth percentile) Weight(growth percentile)  152/78 (BP 1 Location: Right arm, BP Patient Position: Sitting) 75 97.2 °F (36.2 °C) (Oral) 16 5' 7\" (1.702 m) 165 lb (74.8 kg) BMI Smoking Status 25.84 kg/m2 Former Smoker Vitals History BMI and BSA Data Body Mass Index Body Surface Area  
 25.84 kg/m 2 1.88 m 2 Preferred Pharmacy Pharmacy Name Phone [facility]. [facility], [address] [facility] [phone] Your Updated Medication List  
  
   
This list is accurate as of 7/13/18 11:49 AM.  Always use your most recent med list. amLODIPine 10 mg tablet Commonly known as:  [place] Take 1 Tab by mouth every evening. aspirin 81 mg tablet Take 81 mg by mouth daily. hydroCHLOROthiazide 25 mg tablet Commonly known as:  HYDRODIURIL  
take 1 tablet by mouth once daily * ivermectin 0.5 % Lotn Commonly known as:  SKLICE Apply one time to affected area. * ivermectin 3 mg tablet Commonly known as:  STROMECTOL Take 8 tabs for one dose and then a second dose in two weeks  
  
 lisinopril 40 mg tablet Commonly known as:  PRINIVIL, ZESTRIL  
take 1 tablet by mouth once daily MULTIVITAMIN PO Take  by mouth daily. permethrin 5 % topical cream  
Commonly known as:  ACTICIN  
apply sparingly as directed  
  
 tadalafil 10 mg tablet Commonly known as:  CIALIS Take 1 Tab by mouth daily as needed. triamcinolone acetonide 0.1 % topical cream  
Commonly known as:  KENALOG Apply  to affected area two (2) times a day. use thin layer  
  
 urea 40 % topical cream  
Commonly known as:  CARMOL Apply  to affected area two (2) times a day. * Notice: This list has 2 medication(s) that are the same as other medications prescribed for you. Read the directions carefully, and ask your doctor or other care provider to review them with you. Follow-up Instructions Return in about 3 months (around 10/13/2018) for Regular Follow Up . To-Do List   
 07/13/2018 Imaging:  XR ABD (AP AND ERECT OR DECUB) Around 10/11/2018 Lab:  CBC WITH AUTOMATED DIFF Around 10/11/2018 Lab:  LIPID PANEL Around 10/11/2018 Lab:  METABOLIC PANEL, COMPREHENSIVE Patient Instructions Please contact our office if you have any questions about your visit today. 1.) Please take a half a tab of the amlodipine in the morning and the other half in the evening. Continue on the lisinopril and hydrochlorothiazide. 2.) Check blood pressures at least three times a week. Goal is for average blood pressure is less than 140/90. 
 
3.) Please call if your blood pressures are still not within that average range. 4.) We will call if there are any abnormalities on the x-ray. 5.) Return for regular follow up in 3 months. Learning About Diuretics for High Blood Pressure Introduction Diuretics help to lower blood pressure. This reduces your risk of a heart attack and stroke. It also reduces your risk of kidney disease. Diuretics cause your kidneys to remove sodium and water. They also relax the blood vessel walls. These help lower your blood pressure. Examples · Chlorthalidone · Hydrochlorothiazide Possible side effects There are some common side effects. They are: · Too little potassium. · Feeling dizzy. · Rash. · Urinating a lot. · High blood sugar. (But this is not common.) You may have other side effects. Check the information that comes with your medicine. What to know about taking this medicine · You may take other medicines for blood pressure. Diuretics can help those work better. They can also prevent extra fluid in your body. · You may need to take potassium pills. Or you may have to watch how much potassium is in your food. Ask your doctor about this. · You may need blood tests to check your kidneys and your potassium level. · Take your medicines exactly as prescribed.  Call your doctor if you think you are having a problem with your medicine. · Check with your doctor or pharmacist before you use any other medicines. This includes over-the-counter medicines. Make sure your doctor knows all of the medicines, vitamins, herbal products, and supplements you take. Taking some medicines together can cause problems. Where can you learn more? Go to http://magdiel-rom.info/. Enter I429 in the search box to learn more about \"Learning About Diuretics for High Blood Pressure. \" Current as of: May 10, 2017 Content Version: 11.7 © 1828-3891 [employer]. Care instructions adapted under license by [employer] (which disclaims liability or warranty for this information). If you have questions about a medical condition or this instruction, always ask your healthcare professional. [address] any warranty or liability for your use of this information. Introducing [facility] & HEALTH SERVICES! [facility] introduces [employer] patient portal. Now you can access parts of your medical record, email your doctor's office, and request medication refills online. 1. In your internet browser, go to [email or web address] 2. Click on the First Time User? Click Here link in the Sign In box. You will see the New Member Sign Up page. 3. Enter your [employer] Access Code exactly as it appears below. You will not need to use this code after youve completed the sign-up process. If you do not sign up before the expiration date, you must request a new code. · [employer] Access Code: [email or web address] Expires: 10/9/2018 11:24 AM 
 
4. Enter the last four digits of your Social Security Number (xxxx) and Date of Birth (mm/dd/yyyy) as indicated and click Submit. You will be taken to the next sign-up page. 5. Create a [employer] ID. This will be your [employer] login ID and cannot be changed, so think of one that is secure and easy to remember. 6. Create a [employer] password. You can change your password at any time. 7. Enter your Password Reset Question and Answer. This can be used at a later time if you forget your password. 8. Enter your e-mail address. You will receive e-mail notification when new information is available in [address] 9. Click Sign Up. You can now view and download portions of your medical record. 10. Click the Download Summary menu link to download a portable copy of your medical information. If you have questions, please visit the Frequently Asked Questions section of the [employer] website. Remember, [employer] is NOT to be used for urgent needs. For medical emergencies, dial 911. Now available from your iPhone and Android! Please provide this summary of care documentation to your next provider. Your primary care clinician is listed as AUGUSTINE FERRARI. If you have any questions after today's visit, please call [phone].

## 2018-07-30 ENCOUNTER — HOSPITAL ENCOUNTER (OUTPATIENT)
Dept: GENERAL RADIOLOGY | Age: 63
Discharge: HOME | End: 2018-07-30
Payer: COMMERCIAL

## 2018-07-30 DIAGNOSIS — R10.12 LEFT UPPER QUADRANT PAIN: ICD-10-CM

## 2018-07-30 PROCEDURE — 74019 RADEX ABDOMEN 2 VIEWS: CPT

## 2018-10-10 ENCOUNTER — TELEPHONE (OUTPATIENT)
Dept: INTERNAL MEDICINE CLINIC | Age: 63
End: 2018-10-10

## 2018-10-10 NOTE — TELEPHONE ENCOUNTER
PT has a NP appt on 11/02/18- do you want him to get labs done? He is transferring care from Dr Erasmo Casper?

## 2018-10-25 ENCOUNTER — CLINICAL SUPPORT (OUTPATIENT)
Dept: INTERNAL MEDICINE CLINIC | Age: 63
End: 2018-10-25

## 2018-10-25 DIAGNOSIS — Z23 ENCOUNTER FOR IMMUNIZATION: Primary | ICD-10-CM

## 2018-11-02 ENCOUNTER — OFFICE VISIT (OUTPATIENT)
Dept: INTERNAL MEDICINE CLINIC | Age: 63
End: 2018-11-02

## 2018-11-02 VITALS
SYSTOLIC BLOOD PRESSURE: 152 MMHG | WEIGHT: 161 LBS | HEART RATE: 67 BPM | OXYGEN SATURATION: 100 % | TEMPERATURE: 97.7 F | HEIGHT: 67 IN | BODY MASS INDEX: 25.27 KG/M2 | DIASTOLIC BLOOD PRESSURE: 100 MMHG | RESPIRATION RATE: 14 BRPM

## 2018-11-02 DIAGNOSIS — E78.5 HYPERLIPIDEMIA, UNSPECIFIED HYPERLIPIDEMIA TYPE: ICD-10-CM

## 2018-11-02 DIAGNOSIS — I10 ESSENTIAL HYPERTENSION: ICD-10-CM

## 2018-11-02 DIAGNOSIS — N40.0 BENIGN PROSTATIC HYPERPLASIA WITHOUT LOWER URINARY TRACT SYMPTOMS: ICD-10-CM

## 2018-11-02 DIAGNOSIS — Z86.19 HISTORY OF HEPATITIS C: ICD-10-CM

## 2018-11-02 DIAGNOSIS — L30.9 ECZEMA, UNSPECIFIED TYPE: Primary | ICD-10-CM

## 2018-11-02 RX ORDER — ATENOLOL 25 MG/1
25 TABLET ORAL DAILY
Qty: 30 TAB | Refills: 3 | Status: SHIPPED | OUTPATIENT
Start: 2018-11-02 | End: 2018-11-12

## 2018-11-02 RX ORDER — TRIAMCINOLONE ACETONIDE 1 MG/G
CREAM TOPICAL 2 TIMES DAILY
Qty: 454 G | Refills: 0 | Status: SHIPPED | OUTPATIENT
Start: 2018-11-02 | End: 2019-09-17 | Stop reason: SDUPTHER

## 2018-11-02 NOTE — PROGRESS NOTES
HPI  
 
Alma Khan is a 70 y.o. male with relevant past medical history of HTN, HLD, BPH, allergic dermatitis, treated hep C infection. Here to establish care, previous PCP left practice. Patient feels well overall. Would like a refill on triamcinolone cream for h/o ezcema/allergic dermatitis. He is noted to have elevated BP today. Repeated in office by me after several minutes at rest sitting down with BP ranging in the 150s/90s. The patient denies any CP, SOB, dizziness, HA, malaise, leg swelling, palpitations, diarrhea, unexplained weight loss, diaphoresis. He says BP runs in his family and his BP has been difficult to control. He reports compliance with medications. Tells me he tried a higher dose of amlodipine before but it caused leg swelling. He denies any LUTS. Admits to some decreased vision and plan to do an appointment for an eye exam with wife's optometrist. 
He received his influenza vaccine recently. Works as a . Denies any h/o Tob, ETOH, drug use. ROS As above included in HPI. Otherwise 11 point review of systems negative including constitutional, skin, HENT, eyes, respiratory, cardiovascular, gastrointestinal, genitourinary, musculoskeletal, endocrine, hematologic, allergy, and neurologic. Past Medical History Past Medical History:  
Diagnosis Date  BPH (benign prostatic hypertrophy) 10/15/2012  ED (erectile dysfunction) 9/30/2011  Hepatitis C   
 HTN (hypertension) 9/30/2011  Hyperlipidemia 9/30/2011 Past Surgical History:  
Procedure Laterality Date  HX ACL RECONSTRUCTION Family History Family History Problem Relation Age of Onset  Hypertension Mother  Hypertension Father  Alcohol abuse Father  Hypertension Brother  Hypertension Brother Social History He  reports that he has quit smoking. he has never used smokeless tobacco.  
Social History Substance and Sexual Activity Alcohol Use No  
 
 
 Immunization History Immunization History Administered Date(s) Administered  Influenza Vaccine (Quad) PF 09/29/2016, 11/03/2017  Influenza Vaccine (Tri) Adjuvanted 10/25/2018  Tdap 10/02/2015 Allergies Allergies Allergen Reactions  Sean [Amlodipine-Olmesartan] Palpitations and Other (comments) Fatigue, headache, pt states \"kidneys hurt\" Medications Current Outpatient Medications Medication Sig  
 triamcinolone acetonide (KENALOG) 0.1 % topical cream Apply  to affected area two (2) times a day. use thin layer  atenolol (TENORMIN) 25 mg tablet Take 1 Tab by mouth daily.  hydroCHLOROthiazide (HYDRODIURIL) 25 mg tablet take 1 tablet by mouth once daily  amLODIPine (NORVASC) 5 mg tablet take 1 tablet by mouth twice a day  lisinopril (PRINIVIL, ZESTRIL) 40 mg tablet take 1 tablet by mouth once daily  amLODIPine (NORVASC) 10 mg tablet Take 1 Tab by mouth every evening.  permethrin (ACTICIN) 5 % topical cream apply sparingly as directed  tadalafil (CIALIS) 10 mg tablet Take 1 Tab by mouth daily as needed.  ivermectin (SKLICE) 0.5 % lotn Apply one time to affected area.  ivermectin (STROMECTOL) 3 mg tablet Take 8 tabs for one dose and then a second dose in two weeks  urea (CARMOL) 40 % topical cream Apply  to affected area two (2) times a day.  aspirin 81 mg tablet Take 81 mg by mouth daily.  MULTIVITAMIN PO Take  by mouth daily. No current facility-administered medications for this visit. Visit Vitals BP (!) 152/100 (BP 1 Location: Left arm, BP Patient Position: Sitting) Pulse 67 Temp 97.7 °F (36.5 °C) (Oral) Resp 14 Ht 5' 7\" (1.702 m) Wt 161 lb (73 kg) SpO2 100% BMI 25.22 kg/m² Body mass index is 25.22 kg/m². Physical Exam  
Constitutional: He is oriented to person, place, and time and well-developed, well-nourished, and in no distress. HENT:  
Head: Normocephalic and atraumatic. Right Ear: External ear normal.  
Left Ear: External ear normal.  
Nose: Nose normal.  
Mouth/Throat: Oropharynx is clear and moist. No oropharyngeal exudate. Eyes: Conjunctivae and EOM are normal. Pupils are equal, round, and reactive to light. Neck: Normal range of motion. Neck supple. No JVD present. No thyromegaly present. Cardiovascular: Normal rate, regular rhythm, normal heart sounds and intact distal pulses. Pulmonary/Chest: Effort normal and breath sounds normal.  
Abdominal: Soft. Bowel sounds are normal.  
Musculoskeletal: Normal range of motion. He exhibits no edema. Lymphadenopathy:  
  He has no cervical adenopathy. Neurological: He is alert and oriented to person, place, and time. Skin: Skin is warm. Psychiatric: Mood and affect normal.  
Nursing note and vitals reviewed. REVIEW OF DATA Labs No visits with results within 1 Month(s) from this visit. Latest known visit with results is:  
Hospital Outpatient Visit on 07/11/2018 Component Date Value Ref Range Status  [facility] SPECIMEN COL 07/11/2018 Specimens collected/sent to [facility]    Final  
 
 
 
CT Results (most recent): No results found for this or any previous visit. XR Results (most recent): 
Results from Hospital Encounter encounter on 07/30/18 XR ABD (AP AND ERECT OR DECUB) Narrative Abdomen flat and upright views CPT CODE: 46895 CLINICAL HISTORY: Chronic left upper quadrant discomfort COMPARISON: 2/14/14. FINDINGS: Flat and upright views of the abdomen were obtained. There is normal 
bowel gas pattern. No evidence of bowel obstruction, bowel dilatation or 
intraperitoneal free air identified. Moderate amount feces again noted 
throughout the colon. The soft tissue and bony structures appear unremarkable. Impression IMPRESSION: 
 
No bowel obstruction or free air identified. Moderate fecal impaction again seen, suggesting constipation.     
 
Thank you for your referral.   
 
 CT  
All Micro Results None HCM Screening:  
 Colorectal: 2015, recommended follow up in 2020 DIAGNOSIS AND PLAN Patient Active Problem List  
Diagnosis Code  Hyperlipidemia E78.5  
 HTN (hypertension) I10  
 ED (erectile dysfunction) N52.9  BPH (benign prostatic hypertrophy) N40.0  Chronic hepatitis C without hepatic coma (HCC) B18.2  Hydrocele, right N43.3 1. Eczema, unspecified type Refill on triamcinolone cream given. - triamcinolone acetonide (KENALOG) 0.1 % topical cream; Apply  to affected area two (2) times a day. use thin layer  Dispense: 454 g; Refill: 0 
 
2. Essential hypertension Will add atenolol in hopes to optimize BP control. C/w HCTZ, lisinopril and amlodipine as prescribed. (patient reports leg swelling with a higher dose of amlodipine). - atenolol (TENORMIN) 25 mg tablet; Take 1 Tab by mouth daily. Dispense: 30 Tab; Refill: 3 3. History of hepatitis C S/p treatment earlier this year with SVR per patient. Has follow up this month with GI. Dr. Javad Colin, he reports. 4. Benign prostatic hyperplasia without lower urinary tract symptoms Unchanged 5. Hyperlipidemia, unspecified hyperlipidemia type  on labs from July 2018. Diet controlled. Patient has lost 4lb since July 2018. Will repeat lipids next follow up. Follow-up Disposition: 
Return in about 3 months (around 2/2/2019). Eunice Hughes MD

## 2018-11-02 NOTE — PROGRESS NOTES
ROOM # 12 Jasbir Quinn presents today for Chief Complaint Patient presents with  Complete Physical  
 Establish Care Jasbir Quinn preferred language for health care discussion is english/other. Is someone accompanying this pt? No 
 
Is the patient using any DME equipment during OV? No 
 
Depression Screening: PHQ over the last two weeks 11/2/2018 2/12/2018 12/18/2017 11/20/2017 8/31/2017 7/24/2017 6/16/2016 Little interest or pleasure in doing things Not at all Not at all Not at all Not at all Not at all Not at all Not at all Feeling down, depressed, irritable, or hopeless Not at all Not at all Not at all Not at all Not at all Not at all Not at all Total Score PHQ 2 0 0 0 0 0 0 0 Learning Assessment: 
Learning Assessment 12/18/2017 4/14/2014 11/14/2013 12/14/2012 PRIMARY LEARNER Patient Patient Patient Patient HIGHEST LEVEL OF EDUCATION - PRIMARY LEARNER  - 2 YEARS OF COLLEGE GRADUATED HIGH SCHOOL OR GED -  
BARRIERS PRIMARY LEARNER NONE NONE NONE -  
  NONE - - -  
CO-LEARNER CAREGIVER No No No - PRIMARY LANGUAGE ENGLISH ENGLISH ENGLISH ENGLISH  
LEARNER PREFERENCE PRIMARY LISTENING LISTENING READING DEMONSTRATION  
  - - VIDEOS -  
ANSWERED BY patient patient patient patient RELATIONSHIP SELF SELF SELF SELF Abuse Screening: 
Abuse Screening Questionnaire 4/14/2014 Do you ever feel afraid of your partner? Alysa Carrillo Are you in a relationship with someone who physically or mentally threatens you? Alysa Carrillo Is it safe for you to go home? Christine Baker Fall Risk Fall Risk Assessment, last 12 mths 11/2/2018 Able to walk? Yes Fall in past 12 months? No  
 
 
Visit Vitals BP (!) 152/100 (BP 1 Location: Left arm, BP Patient Position: Sitting) Pulse 67 Temp 97.7 °F (36.5 °C) (Oral) Resp 14 Ht 5' 7\" (1.702 m) Wt 161 lb (73 kg) SpO2 100% BMI 25.22 kg/m² Health Maintenance reviewed and discussed per provider. Yes Jasbir Quinn is due for Health Maintenance Due Topic Date Due  Shingrix Vaccine Age 50> (1 of 2) 05/24/2003  GLAUCOMA SCREENING Q2Y  05/24/2018  Pneumococcal 65+ Low/Medium Risk (1 of 2 - PCV13) 05/24/2018 Please order/place referral if appropriate. Advance Directive: 1. Do you have an advance directive in place? Patient Reply: No 
 
2. If not, would you like material regarding how to put one in place? Patient Reply: No 
 
Coordination of Care: 1. Have you been to the ER, urgent care clinic since your last visit? Hospitalized since your last visit? No 
 
2. Have you seen or consulted any other health care providers outside of the [address] [place] [address] since your last visit? Include any pap smears or colon screening.  No

## 2018-11-12 ENCOUNTER — HOSPITAL ENCOUNTER (OUTPATIENT)
Dept: LAB | Age: 63
Discharge: HOME | End: 2018-11-12

## 2018-11-12 ENCOUNTER — OFFICE VISIT (OUTPATIENT)
Dept: HEMATOLOGY | Age: 63
End: 2018-11-12

## 2018-11-12 VITALS
BODY MASS INDEX: 25.49 KG/M2 | HEIGHT: 67 IN | DIASTOLIC BLOOD PRESSURE: 102 MMHG | RESPIRATION RATE: 16 BRPM | WEIGHT: 162.4 LBS | OXYGEN SATURATION: 100 % | HEART RATE: 66 BPM | SYSTOLIC BLOOD PRESSURE: 189 MMHG | TEMPERATURE: 97 F

## 2018-11-12 DIAGNOSIS — B18.2 CHRONIC HEPATITIS C WITHOUT HEPATIC COMA (HCC): Primary | ICD-10-CM

## 2018-11-12 LAB — SENTARA SPECIMEN COL,SENBCF: NORMAL

## 2018-11-12 PROCEDURE — 99001 SPECIMEN HANDLING PT-LAB: CPT

## 2018-11-12 NOTE — PROGRESS NOTES
[address] Sampson MD, [address], [place], [place]       ALAN Higginbotham PA-C Fraser Manos, ACNP-[facility] ALAN Ramirez NP        at [facility]     [address] [employer], [address], [place]  [address].     [phone]     FAX: [phone]    at [place] [address], [address], [address]     [phone]     FAX: [phone]         Patient Care Team:  Liset Roldan MD as PCP - General (Internal Medicine)  Ivette Mina MD (Gastroenterology)      Problem List  Date Reviewed: 11/12/2018          Codes Class Noted    Chronic hepatitis C without hepatic coma [facility]) ICD-10-CM: B18.2  ICD-9-CM: 070.54  8/3/2015        Hydrocele, right ICD-10-CM: N43.3  ICD-9-CM: 603.9  8/3/2015        BPH (benign prostatic hypertrophy) ICD-10-CM: N40.0  ICD-9-CM: 600.00  10/15/2012        Hyperlipidemia ICD-10-CM: E78.5  ICD-9-CM: 272.4  9/30/2011        HTN (hypertension) ICD-10-CM: I10  ICD-9-CM: 401.9  9/30/2011        ED (erectile dysfunction) ICD-10-CM: N52.9  ICD-9-CM: 607.84  9/30/2011                Curtis York returns to the [facility] today for education and management of chronic HCV. The patient is here for post treatment week #12 of the Salvage clinical trial.      The active problem list, all pertinent past medical history, medications, liver histology, endoscopic studies, radiologic findings and laboratory findings related to the liver disorder were reviewed with the patient. The patient is a 70 y.o. Black male who was noted to have abnormalities in liver chemistries and subsequently tested positive for chronic HCV in the  2000s. Risk factors for acquiring HCV are IV drug use, inhaling cocaine, tattoos, and mass vaccination after enlisting in the [employer] all between [id number].   There was an episode of acute incteric hepatitis at the time of these risk factors. Ultrasound of the liver was performed in 4/2017. The results of the imaging demonstrated a normal appearing liver. An assessment of liver fibrosis with Fibrosure was 0.43 consistent with stage F2. The patient was treated with Norlin Dominion from 4-6/2017. The patient was treated for 8 weeks. The patient tolerated treatment reasonably well. HCV RNA levels obtained during treatment demonstrate a response followed by relapse. The most recent laboratory studies indicate that the liver transaminases are normal, alkaline phosphatase is normal, tests of hepatic synthetic and metabolic function are normal, and the platelet count is normal.      The patient has no symptoms which can be attributed to the liver disorder. The patient completes all daily activities without any functional limitations. The patient has not experienced fatigue, fevers, chills, shortness of breath, chest pain, pain in the right side over the liver, diffuse abdominal pain, nausea, vomiting, constipation, diarrhrea, dry eyes, dry mouth, arthralgias, myalgias, yellowing of the eyes or skin, itching, dark urine, problems concentrating, swelling of the abdomen, swelling of the lower extremities, hematemesis, or hematochezia. ALLERGIES  Allergies   Allergen Reactions    Sean [Amlodipine-Olmesartan] Palpitations and Other (comments)     Fatigue, headache, pt states \"kidneys hurt\"       MEDICATIONS  Current Outpatient Medications   Medication Sig    triamcinolone acetonide (KENALOG) 0.1 % topical cream Apply  to affected area two (2) times a day. use thin layer    hydroCHLOROthiazide (HYDRODIURIL) 25 mg tablet take 1 tablet by mouth once daily    amLODIPine (NORVASC) 5 mg tablet take 1 tablet by mouth twice a day    lisinopril (PRINIVIL, ZESTRIL) 40 mg tablet take 1 tablet by mouth once daily    urea (CARMOL) 40 % topical cream Apply  to affected area two (2) times a day.     aspirin 81 mg tablet Take 81 mg by mouth daily.  MULTIVITAMIN PO Take  by mouth daily.  tadalafil (CIALIS) 10 mg tablet Take 1 Tab by mouth daily as needed. No current facility-administered medications for this visit. SYSTEM REVIEW NOT RELATED TO LIVER DISEASE OR REVIEWED ABOVE:  Constitution systems: Negative for fever, chills, weight gain, weight loss. Eyes: Negative for visual changes. ENT: Negative for sore throat, painful swallowing. Respiratory: Negative for cough, hemoptysis, SOB. Cardiology: Negative for chest pain, palpitations. GI:  Negative for constipation or diarrhea. : Negative for urinary frequency, dysuria, hematuria, nocturia. Skin: Negative for rash. Hematology: Negative for easy bruising, blood clots. Musculo-skelatal: Negative for back pain, muscle pain, weakness. Neurologic: Negative for headaches, dizziness, vertigo, memory problems not related to HE. Psychology: Negative for anxiety, depression. FAMILY HISTORY:  The father  of alcoholism. The mother has the following chronic diseases: dementia. There is no family history of liver disease. SOCIAL HISTORY:  The patient is . The spouse has been tested for HCV and is negative. The patient has 3 children, 1 stepchildren, 1 adopted and 12 grandchildren. The patient stopped using tobacco products in 1970s. The patient consumes 1 alcoholic beverages per day. The patient currently works full time . PHYSICAL EXAMINATION:  Visit Vitals  BP (!) 189/102 (BP 1 Location: Left arm, BP Patient Position: Sitting)   Pulse 66   Temp 97 °F (36.1 °C) (Tympanic)   Resp 16   Ht 5' 7\" (1.702 m)   Wt 162 lb 6.4 oz (73.7 kg)   SpO2 100%   BMI 25.44 kg/m²     General: No acute distress. Eyes: Sclera anicteric. ENT: No oral lesions. Thyroid normal.  Nodes: No adenopathy. Skin: No spider angiomata. No jaundice. No palmar erythema. Respiratory: Lungs clear to auscultation.    Cardiovascular: Regular heart rate.  No murmurs. No JVD. Abdomen: Soft non-tender. Liver size normal to percussion/palpation. Spleen not palpable. No obvious ascites. Extremities: No edema. No muscle wasting. No gross arthritic changes. Neurologic: Alert and oriented. Cranial nerves grossly intact. No asterixis. LABORATORY STUDIES:  [facility] [address] [place] [address] & Units 7/11/2018 2/12/2018   WBC 4.0 - 11.0 K/uL 8.0 5.1   ANC 1.8 - 7.7 K/uL 3.9 2.2   HGB 12.6 - 17.1 g/dL 14.9 15.6    - 440 K/uL 264 266   AST 10 - 37 U/L 19 26   ALT 5 - 40 U/L 14 17   Alk Phos 40 - 125 U/L 62 62   Bili, Total 0.2 - 1.2 mg/dL 0.3 0.3   Bili, Direct 0.0 - 0.3 mg/dL  <0.2   Albumin 3.5 - 5.0 g/dL 4.2 4.3   BUN 6 - 22 mg/dL 14 12   Creat 0.8 - 1.6 mg/dL 1.0 1.1   Na 133 - 145 mmol/L 143 138   K 3.5 - 5.5 mmol/L 4.1 3.9   Cl 98 - 110 mmol/L 102 98   CO2 20 - 32 mmol/L 26 28   Glucose 70 - 99 mg/dL 98 131 (H)       SEROLOGIES:  1/2015. HCV RNA Log 6.5 IU/ml,   10/2016. HAV total positive, HBsAntigen negative, anti-HBcore negative, anti-HBsurface negative, HCV Geneotype 1A, anti-HIV negative  12/2016. HCV RNA undetectable  1/2017. HCV RNA undetectable  4/2017. HCV RNA Log 6.06 IU/ml,   11/2017. HCV RNA not detected. LIVER HISTOLOGY:  3/2015. HCV Fibrosure 0.43. Consistent with stage 2 fibrosis. 4/2017. HCV Fibrosure 0.55. Consistent with stage 2 fibrosis. ENDOSCOPIC PROCEDURES:  3/2015. Colonoscopy by Dr Rao Butler. No polyps    RADIOLOGY:  4/2017. Ultrasound of liver. Normal appearing liver. No liver mass lesions. OTHER TESTING:  Not available or performed    ASSESSMENT AND PLAN:  Chronic HCV with stage 2 fibrosis by Fibrosure.   The most recent laboratory studies indicate that the liver transaminases are normal, alkaline phosphatase is normal, tests of hepatic synthetic and metabolic function are normal, and the platelet count is normal.  Will perform laboratory testing to monitor liver function and degree of liver injury. This will include hepatic panel, a CBC w/ diff, a BMP, and HCV RNA. The patient was previously treated for HCV with Marce Elpidio. There was a response then relapse. The patient has HCV genotype 1A. The patient is enrolled [facility]  clinical trial for treatment experienced patients previously exposed to NS5A inhibitor + SOF. He was in the 12 week arm for non-cirrhotics. He had no treatment related complaints. No HCV RNA detected at end of treatment visit and 12 weeks afterwards. The need to perform an assessment of liver fibrosis was discussed with the patient. Elastography  can assess liver fibrosis and determine if a patient has advanced fibrosis or cirrhosis without the need for liver biopsy. This can also be performed with ultrasound. This was ordered today. The patient was directed to continue all current medications at the current dosages. There are no contraindications for the patient to take any medications that are necessary for treatment of other medical issues. The patient was counseled regarding alcohol consumption. Vaccination for viral hepatitis A is not required. The patient has serologic evidence of prior exposure or vaccination with immunity. Vaccination for viral hepatitis B is recommended. The patient does not have serologic evidence of prior exposure or vaccination with immunity. All of the above issues were discussed with the patient. All questions were answered. The patient expressed a clear understanding of the above. Follow-up Doron Watson 32 on a PRN basis. If the shear wave elastography suggests a Metavir score of F3 or more advanced disease, a follow up appointment will be made on the patient's behalf.       Lucas Church NP   [facility] [address] [place] [address], [address] [place] [address] Inez Canela, [address] [facility]   [phone]

## 2018-11-13 LAB
A-G RATIO,AGRAT: 1.4 RATIO (ref 1.1–2.6)
ABSOLUTE LYMPHOCYTE COUNT, 10803: 2.5 K/UL (ref 1–4.8)
ALBUMIN SERPL-MCNC: 4.7 G/DL (ref 3.5–5)
ALP SERPL-CCNC: 72 U/L (ref 40–125)
ALT SERPL-CCNC: 19 U/L (ref 5–40)
ANION GAP SERPL CALC-SCNC: 22 MMOL/L
AST SERPL W P-5'-P-CCNC: 24 U/L (ref 10–37)
BASOPHILS # BLD: 0 K/UL (ref 0–0.2)
BASOPHILS NFR BLD: 0 % (ref 0–2)
BILIRUB SERPL-MCNC: 0.3 MG/DL (ref 0.2–1.2)
BILIRUBIN, DIRECT,CBIL: <0.2 MG/DL (ref 0–0.3)
BUN SERPL-MCNC: 13 MG/DL (ref 6–22)
CALCIUM SERPL-MCNC: 10.3 MG/DL (ref 8.4–10.4)
CHLORIDE SERPL-SCNC: 100 MMOL/L (ref 98–110)
CO2 SERPL-SCNC: 24 MMOL/L (ref 20–32)
CREAT SERPL-MCNC: 0.9 MG/DL (ref 0.8–1.6)
EOSINOPHIL # BLD: 0.1 K/UL (ref 0–0.5)
EOSINOPHIL NFR BLD: 1 % (ref 0–6)
ERYTHROCYTE [DISTWIDTH] IN BLOOD BY AUTOMATED COUNT: 13.1 % (ref 10–15.5)
GFRAA, 66117: >60
GFRNA, 66118: >60
GLOBULIN,GLOB: 3.3 G/DL (ref 2–4)
GLUCOSE SERPL-MCNC: 95 MG/DL (ref 70–99)
GRANULOCYTES,GRANS: 47 % (ref 40–75)
HCT VFR BLD AUTO: 50.2 % (ref 37.8–52.2)
HGB BLD-MCNC: 16.2 G/DL (ref 12.6–17.1)
LYMPHOCYTES, LYMLT: 46 % (ref 20–45)
MCH RBC QN AUTO: 31 PG (ref 26–34)
MCHC RBC AUTO-ENTMCNC: 32 G/DL (ref 31–36)
MCV RBC AUTO: 95 FL (ref 80–95)
MONOCYTES # BLD: 0.4 K/UL (ref 0.1–1)
MONOCYTES NFR BLD: 6 % (ref 3–12)
NEUTROPHILS # BLD AUTO: 2.5 K/UL (ref 1.8–7.7)
PLATELET # BLD AUTO: 301 K/UL (ref 140–440)
PMV BLD AUTO: 11.3 FL (ref 9–13)
POTASSIUM SERPL-SCNC: 4.4 MMOL/L (ref 3.5–5.5)
PROT SERPL-MCNC: 8 G/DL (ref 6.2–8.1)
RBC # BLD AUTO: 5.28 M/UL (ref 3.8–5.8)
SODIUM SERPL-SCNC: 146 MMOL/L (ref 133–145)
WBC # BLD AUTO: 5.4 K/UL (ref 4–11)

## 2018-11-14 LAB — HEPATITIS C VIRUS RNA PCR TND: NORMAL IU/ML

## 2018-11-27 ENCOUNTER — HOSPITAL ENCOUNTER (OUTPATIENT)
Dept: ULTRASOUND IMAGING | Age: 63
Discharge: HOME | End: 2018-11-27
Payer: COMMERCIAL

## 2018-11-27 DIAGNOSIS — B18.2 CHRONIC HEPATITIS C WITHOUT HEPATIC COMA (HCC): ICD-10-CM

## 2018-11-27 PROCEDURE — 0346T US ABD LTD W ELASTOGRAPHY: CPT

## 2018-12-03 ENCOUNTER — TELEPHONE (OUTPATIENT)
Dept: HEMATOLOGY | Age: 63
End: 2018-12-03

## 2019-02-08 ENCOUNTER — OFFICE VISIT (OUTPATIENT)
Dept: INTERNAL MEDICINE CLINIC | Age: 64
End: 2019-02-08

## 2019-02-08 VITALS
SYSTOLIC BLOOD PRESSURE: 152 MMHG | DIASTOLIC BLOOD PRESSURE: 82 MMHG | TEMPERATURE: 98.1 F | OXYGEN SATURATION: 98 % | WEIGHT: 162.7 LBS | BODY MASS INDEX: 25.54 KG/M2 | HEART RATE: 59 BPM | RESPIRATION RATE: 17 BRPM | HEIGHT: 67 IN

## 2019-02-08 DIAGNOSIS — I10 ESSENTIAL HYPERTENSION: ICD-10-CM

## 2019-02-08 DIAGNOSIS — E78.5 HYPERLIPIDEMIA, UNSPECIFIED HYPERLIPIDEMIA TYPE: Primary | ICD-10-CM

## 2019-02-08 RX ORDER — ATENOLOL 25 MG/1
25 TABLET ORAL DAILY
Qty: 90 TAB | Refills: 1 | Status: SHIPPED | OUTPATIENT
Start: 2019-02-08 | End: 2019-03-19 | Stop reason: SINTOL

## 2019-02-08 RX ORDER — ATENOLOL 25 MG/1
25 TABLET ORAL DAILY
COMMUNITY
End: 2019-02-08 | Stop reason: SDUPTHER

## 2019-02-08 RX ORDER — HYDROCHLOROTHIAZIDE 25 MG/1
TABLET ORAL
Qty: 90 TAB | Refills: 0 | Status: CANCELLED | OUTPATIENT
Start: 2019-02-08

## 2019-02-08 RX ORDER — HYDROCHLOROTHIAZIDE 50 MG/1
50 TABLET ORAL DAILY
Qty: 90 TAB | Refills: 3 | Status: SHIPPED | OUTPATIENT
Start: 2019-02-08 | End: 2019-10-14 | Stop reason: SDUPTHER

## 2019-02-08 NOTE — PROGRESS NOTES
HPI     Kaylan Grayson is a 70 y.o. male with relevant past medical history of HTN, HLD, BPH, allergic dermatitis, treated hep C infection. Here for follow up. He is noted to have elevated BP today 150/80-70s. Repeated in office by me after several minutes at rest sitting down with BP ranging in the 150s/70s. The patient denies any CP, SOB, dizziness, HA, malaise, leg swelling, palpitations, diarrhea, unexplained weight loss, diaphoresis. He says BP runs in his family and his BP has been difficult to control. He reports compliance with medications. He denies any LUTS. Denies any h/o Tob, ETOH, drug use. ROS  As above included in HPI. Otherwise 11 point review of systems negative including constitutional, skin, HENT, eyes, respiratory, cardiovascular, gastrointestinal, genitourinary, musculoskeletal, endocrine, hematologic, allergy, and neurologic. Past Medical History  Past Medical History:   Diagnosis Date    BPH (benign prostatic hypertrophy) 10/15/2012    ED (erectile dysfunction) 9/30/2011    Hepatitis C     HTN (hypertension) 9/30/2011    Hyperlipidemia 9/30/2011     Past Surgical History:   Procedure Laterality Date    HX ACL RECONSTRUCTION          Family History  Family History   Problem Relation Age of Onset    Hypertension Father     Alcohol abuse Father     Hypertension Mother     Hypertension Brother     Hypertension Brother        Social History  He  reports that he has quit smoking.  he has never used smokeless tobacco.   Social History     Substance and Sexual Activity   Alcohol Use No       Immunization History  Immunization History   Administered Date(s) Administered    Influenza Vaccine (Quad) PF 09/29/2016, 11/03/2017    Influenza Vaccine (Tri) Adjuvanted 10/25/2018    Tdap 10/02/2015       Allergies  Allergies   Allergen Reactions    Sean [Amlodipine-Olmesartan] Palpitations and Other (comments)     Fatigue, headache, pt states \"kidneys hurt\" Medications  Current Outpatient Medications   Medication Sig    atenolol (TENORMIN) 25 mg tablet Take 1 Tab by mouth daily.  hydroCHLOROthiazide (HYDRODIURIL) 50 mg tablet Take 1 Tab by mouth daily.  triamcinolone acetonide (KENALOG) 0.1 % topical cream Apply  to affected area two (2) times a day. use thin layer    amLODIPine (NORVASC) 5 mg tablet take 1 tablet by mouth twice a day    lisinopril (PRINIVIL, ZESTRIL) 40 mg tablet take 1 tablet by mouth once daily    tadalafil (CIALIS) 10 mg tablet Take 1 Tab by mouth daily as needed.  urea (CARMOL) 40 % topical cream Apply  to affected area two (2) times a day.  aspirin 81 mg tablet Take 81 mg by mouth daily.  MULTIVITAMIN PO Take  by mouth daily. No current facility-administered medications for this visit. Visit Vitals  /82 (BP 1 Location: Left arm, BP Patient Position: Sitting)   Pulse (!) 59   Temp 98.1 °F (36.7 °C) (Oral)   Resp 17   Ht 5' 7\" (1.702 m)   Wt 162 lb 11.2 oz (73.8 kg)   SpO2 98%   BMI 25.48 kg/m²     Body mass index is 25.48 kg/m². Physical Exam   Constitutional: He is oriented to person, place, and time and well-developed, well-nourished, and in no distress. HENT:   Head: Normocephalic and atraumatic. Right Ear: External ear normal.   Left Ear: External ear normal.   Nose: Nose normal.   Mouth/Throat: Oropharynx is clear and moist. No oropharyngeal exudate. Eyes: Conjunctivae and EOM are normal. Pupils are equal, round, and reactive to light. Neck: Normal range of motion. Neck supple. No JVD present. No thyromegaly present. Cardiovascular: Normal rate, regular rhythm, normal heart sounds and intact distal pulses. Pulmonary/Chest: Effort normal and breath sounds normal.   Abdominal: Soft. Bowel sounds are normal.   Musculoskeletal: Normal range of motion. He exhibits no edema. Lymphadenopathy:     He has no cervical adenopathy.    Neurological: He is alert and oriented to person, place, and time. Skin: Skin is warm. Psychiatric: Mood and affect normal.   Nursing note and vitals reviewed. REVIEW OF DATA    Labs  No visits with results within 1 Month(s) from this visit. Latest known visit with results is:   Hospital Outpatient Visit on 11/12/2018   Component Date Value Ref Range Status    [facility] SPE[facility]IM[facility] COL 11/12/2018 Specimens collected/sent to [facility]    Final         CT Results (most recent):  No results found for this or any previous visit. XR Results (most recent):  Results from Hospital Encounter encounter on 07/30/18   XR ABD (AP AND ERECT OR DECUB)    Narrative Abdomen flat and upright views    CPT CODE: 98704    CLINICAL HISTORY: Chronic left upper quadrant discomfort    COMPARISON: 2/14/14. FINDINGS: Flat and upright views of the abdomen were obtained. There is normal  bowel gas pattern. No evidence of bowel obstruction, bowel dilatation or  intraperitoneal free air identified. Moderate amount feces again noted  throughout the colon. The soft tissue and bony structures appear unremarkable. Impression IMPRESSION:    No bowel obstruction or free air identified. Moderate fecal impaction again seen, suggesting constipation. Thank you for your referral.       CT   All Micro Results     None             HCM  Screening:    Colorectal: 2015, recommended follow up in 2020     DIAGNOSIS AND PLAN  Patient Active Problem List   Diagnosis Code    Hyperlipidemia E78.5    HTN (hypertension) I10    ED (erectile dysfunction) N52.9    BPH (benign prostatic hypertrophy) N40.0    Chronic hepatitis C without hepatic coma (HCC) B18.2    Hydrocele, right N43.3     1. Essential hypertension  Will increase HCTZ to 50 mg daily in hopes of optimizing BP control. C/w  Atenolol, lisinopril and amlodipine as prescribed. Asked to call me back in case of any adverse events or intolerance to the higher dose.  We discussed in extent the importance of optimal BP control to decrease risk of cardiovascular events. 5. Hyperlipidemia, unspecified hyperlipidemia type    on labs from July 2018. Diet controlled. Will repeat lipids next follow up. Follow-up Disposition:  Return in about 3 months (around 5/8/2019). Eunice Garza MD

## 2019-02-20 ENCOUNTER — TELEPHONE (OUTPATIENT)
Dept: INTERNAL MEDICINE CLINIC | Age: 64
End: 2019-02-20

## 2019-02-20 NOTE — TELEPHONE ENCOUNTER
Pt calling having allergic reaction to the atenolol, swelling in throat and tongue, having a rash and feeling fatigued. Been on med for 3 months and has been feeling this way since starting. Please advise.

## 2019-02-20 NOTE — TELEPHONE ENCOUNTER
Pt said he is having a reaction to his ATENOLOL- he said he has developed a rash , and swelling in his tongue please advise- [phone]

## 2019-03-19 ENCOUNTER — OFFICE VISIT (OUTPATIENT)
Dept: INTERNAL MEDICINE CLINIC | Age: 64
End: 2019-03-19

## 2019-03-19 VITALS
WEIGHT: 164 LBS | DIASTOLIC BLOOD PRESSURE: 78 MMHG | SYSTOLIC BLOOD PRESSURE: 138 MMHG | TEMPERATURE: 97.4 F | HEART RATE: 90 BPM | OXYGEN SATURATION: 99 % | RESPIRATION RATE: 18 BRPM | HEIGHT: 67 IN | BODY MASS INDEX: 25.74 KG/M2

## 2019-03-19 DIAGNOSIS — Z13.5 GLAUCOMA SCREENING: ICD-10-CM

## 2019-03-19 DIAGNOSIS — E78.5 HYPERLIPIDEMIA, UNSPECIFIED HYPERLIPIDEMIA TYPE: ICD-10-CM

## 2019-03-19 DIAGNOSIS — I10 ESSENTIAL HYPERTENSION: Primary | ICD-10-CM

## 2019-03-19 RX ORDER — AMLODIPINE BESYLATE 5 MG/1
TABLET ORAL
Qty: 180 TAB | Refills: 2 | Status: SHIPPED | OUTPATIENT
Start: 2019-03-19 | End: 2020-04-15

## 2019-03-19 NOTE — PROGRESS NOTES
HPI     Anders Alatorre is a 70 y.o. male with relevant past medical history of HTN, HLD, BPH, allergic dermatitis, treated hep C infection. Here for follow up. He reports rash as a side effect of atenolol, stopped taking it approximately late February. Reports compliance with the rest of his medications. BP today 138/78. The patient is not interested in adding any new medications at this point. The patient denies any CP, SOB, dizziness, HA, malaise, leg swelling, palpitations, diarrhea, unexplained weight loss, diaphoresis. He denies any LUTS. Denies any h/o Tob, ETOH, drug use. ROS  As above included in HPI. Otherwise 11 point review of systems negative including constitutional, skin, HENT, eyes, respiratory, cardiovascular, gastrointestinal, genitourinary, musculoskeletal, endocrine, hematologic, allergy, and neurologic. Past Medical History  Past Medical History:   Diagnosis Date    BPH (benign prostatic hypertrophy) 10/15/2012    ED (erectile dysfunction) 9/30/2011    Hepatitis C     HTN (hypertension) 9/30/2011    Hyperlipidemia 9/30/2011     Past Surgical History:   Procedure Laterality Date    HX ACL RECONSTRUCTION          Family History  Family History   Problem Relation Age of Onset    Hypertension Father     Alcohol abuse Father     Hypertension Mother     Hypertension Brother     Hypertension Brother        Social History  He  reports that he has quit smoking.  he has never used smokeless tobacco.   Social History     Substance and Sexual Activity   Alcohol Use No       Immunization History  Immunization History   Administered Date(s) Administered    Influenza Vaccine (Quad) PF 09/29/2016, 11/03/2017    Influenza Vaccine (Tri) Adjuvanted 10/25/2018    Tdap 10/02/2015       Allergies  Allergies   Allergen Reactions    Sean [Amlodipine-Olmesartan] Palpitations and Other (comments)     Fatigue, headache, pt states \"kidneys hurt\"    Atenolol Rash       Medications  Current Outpatient Medications   Medication Sig    triamcinolone acetonide (KENALOG) 0.1 % topical cream Apply  to affected area two (2) times a day. use thin layer    amLODIPine (NORVASC) 5 mg tablet take 1 tablet by mouth twice a day    lisinopril (PRINIVIL, ZESTRIL) 40 mg tablet take 1 tablet by mouth once daily    tadalafil (CIALIS) 10 mg tablet Take 1 Tab by mouth daily as needed.  urea (CARMOL) 40 % topical cream Apply  to affected area two (2) times a day.  aspirin 81 mg tablet Take 81 mg by mouth daily.  MULTIVITAMIN PO Take  by mouth daily.  hydroCHLOROthiazide (HYDRODIURIL) 50 mg tablet Take 1 Tab by mouth daily. No current facility-administered medications for this visit. Visit Vitals  /78 (BP 1 Location: Right arm, BP Patient Position: Sitting)   Pulse 90   Temp 97.4 °F (36.3 °C) (Oral)   Resp 18   Ht 5' 7\" (1.702 m)   Wt 164 lb (74.4 kg)   SpO2 99%   BMI 25.69 kg/m²     Body mass index is 25.69 kg/m². Physical Exam   Constitutional: He is oriented to person, place, and time and well-developed, well-nourished, and in no distress. HENT:   Head: Normocephalic and atraumatic. Right Ear: External ear normal.   Left Ear: External ear normal.   Nose: Nose normal.   Mouth/Throat: Oropharynx is clear and moist. No oropharyngeal exudate. Eyes: Conjunctivae and EOM are normal. Pupils are equal, round, and reactive to light. Neck: Normal range of motion. Neck supple. No JVD present. No thyromegaly present. Cardiovascular: Normal rate, regular rhythm, normal heart sounds and intact distal pulses. Pulmonary/Chest: Effort normal and breath sounds normal.   Abdominal: Soft. Bowel sounds are normal.   Musculoskeletal: Normal range of motion. He exhibits no edema. Lymphadenopathy:     He has no cervical adenopathy. Neurological: He is alert and oriented to person, place, and time. Skin: Skin is warm.    Psychiatric: Mood and affect normal.   Nursing note and vitals reviewed. REVIEW OF DATA    Labs  No visits with results within 1 Month(s) from this visit. Latest known visit with results is:   Hospital Outpatient Visit on 11/12/2018   Component Date Value Ref Range Status    [facility] COL 11/12/2018 Specimens collected/sent to [facility]    Final         CT Results (most recent):  No results found for this or any previous visit. XR Results (most recent):  Results from Hospital Encounter encounter on 07/30/18   XR ABD (AP AND ERECT OR DECUB)    Narrative Abdomen flat and upright views    CPT CODE: 98630    CLINICAL HISTORY: Chronic left upper quadrant discomfort    COMPARISON: 2/14/14. FINDINGS: Flat and upright views of the abdomen were obtained. There is normal  bowel gas pattern. No evidence of bowel obstruction, bowel dilatation or  intraperitoneal free air identified. Moderate amount feces again noted  throughout the colon. The soft tissue and bony structures appear unremarkable. Impression IMPRESSION:    No bowel obstruction or free air identified. Moderate fecal impaction again seen, suggesting constipation. Thank you for your referral.       CT   All Micro Results     None             HCM  Screening:    Colorectal: 2015, recommended follow up in 2020     DIAGNOSIS AND PLAN  Patient Active Problem List   Diagnosis Code    Hyperlipidemia E78.5    HTN (hypertension) I10    ED (erectile dysfunction) N52.9    BPH (benign prostatic hypertrophy) N40.0    Chronic hepatitis C without hepatic coma (HCC) B18.2    Hydrocele, right N43.3     1. Essential hypertension  C/w HCTZ to 50 mg, lisinopril 40 mg daily and recommended to increase amlodipine to 10 mg daily (taking only 5 mg daily now) will start 5 mg twice daily. Reported allergic reaction to atenolol- rash. Stopped. 5. Hyperlipidemia, unspecified hyperlipidemia type    on labs from July 2018. Diet controlled. Will repeat lipids before next follow up- fasting. HM  Patient will defer for now vaccination, interested in prevnar 13, will read information regarding potential side effects and decide if he wants to get it next visit. Referred to optometry for glaucoma screening    Follow-up Disposition: Not on File     Eunice Gonzalez MD

## 2019-06-17 DIAGNOSIS — I10 ESSENTIAL HYPERTENSION: ICD-10-CM

## 2019-06-17 RX ORDER — LISINOPRIL 40 MG/1
TABLET ORAL
Qty: 90 TAB | Refills: 3 | Status: SHIPPED | OUTPATIENT
Start: 2019-06-17 | End: 2020-04-15

## 2019-06-19 DIAGNOSIS — E78.5 HYPERLIPIDEMIA, UNSPECIFIED HYPERLIPIDEMIA TYPE: ICD-10-CM

## 2019-06-19 DIAGNOSIS — I10 ESSENTIAL HYPERTENSION: ICD-10-CM

## 2019-09-05 ENCOUNTER — OFFICE VISIT (OUTPATIENT)
Dept: CARDIOLOGY CLINIC | Age: 64
End: 2019-09-05

## 2019-09-05 VITALS
DIASTOLIC BLOOD PRESSURE: 80 MMHG | HEIGHT: 67 IN | SYSTOLIC BLOOD PRESSURE: 170 MMHG | OXYGEN SATURATION: 97 % | HEART RATE: 63 BPM | WEIGHT: 169 LBS | BODY MASS INDEX: 26.53 KG/M2

## 2019-09-05 DIAGNOSIS — I10 ESSENTIAL HYPERTENSION: Primary | ICD-10-CM

## 2019-09-05 DIAGNOSIS — E78.5 HYPERLIPIDEMIA, UNSPECIFIED HYPERLIPIDEMIA TYPE: ICD-10-CM

## 2019-09-05 NOTE — PROGRESS NOTES
Marta Lares presents today for   Chief Complaint   Patient presents with    New Patient     referred by PCP for HTN        Marta Lares preferred language for health care discussion is english/other. Is someone accompanying this pt? no    Is the patient using any DME equipment during [address]? no    Depression Screening:  3 most recent PHQ Screens 11/2/2018   Little interest or pleasure in doing things Not at all   Feeling down, depressed, irritable, or hopeless Not at all   Total Score PHQ 2 0       Learning Assessment:  Learning Assessment 12/18/2017   PRIMARY LEARNER Patient   HIGHEST LEVEL OF EDUCATION - PRIMARY LEARNER  -   BARRIERS PRIMARY LEARNER NONE     NONE   CO-LEARNER CAREGIVER No   PRIMARY LANGUAGE ENGLISH   LEARNER PREFERENCE PRIMARY LISTENING     -   ANSWERED BY patient   RELATIONSHIP SELF       Abuse Screening:  Abuse Screening Questionnaire 4/14/2014   Do you ever feel afraid of your partner? N   Are you in a relationship with someone who physically or mentally threatens you? N   Is it safe for you to go home? Y       Fall Risk  Fall Risk Assessment, last 12 mths 11/2/2018   Able to walk? Yes   Fall in past 12 months? No       Pt currently taking Anticoagulant therapy? ASA 81mg every day     Coordination of Care:  1. Have you been to the ER, urgent care clinic since your last visit? Hospitalized since your last visit? no    2. Have you seen or consulted any other health care providers outside of the [address] [place] [address] since your last visit? Include any pap smears or colon screening.  no

## 2019-09-05 NOTE — PROGRESS NOTES
HPI: A 68-year old male self-referred for hypertension. I last saw him many years ago. He has been doing relatively well. He denies any chest pain, dyspnea, PND, orthopnea or edema. He does have a fair amount of salt including salted pistachios which he had last night. His blood pressure in the office with Dr. Angle Hodges was 279 mmHg systolic. He is in the process of establishing with a new primary care physician. Impression/Plan:  1. Hypertension, poorly controlled and likely worsened due to some diet and salt intake. We talked about decreasing salt. 2. BPH and erectile dysfunction. 3. History of hepatitis C status post treatment. I had a lengthy discussion about his high blood pressure. I will obtain an echocardiogram to make sure there is no left ventricular dysfunction. He will continue with current medications and I recommended he obtain a blood pressure monitor as he does state he has a history of white coat syndrome. He will establish care with a new primary care physician and if his systolic blood pressure is consistently > 130 mmHg, I would make appropriate adjustments. He is already on HCTZ 50, lisinopril 40 and Norvasc 10 mg daily. It may be reasonable to add hydralazine or a nitrate. When I see him back, I also will discuss possible renal ultrasound for completeness given at least three antihypertensives if his blood pressure remains elevated. All questions answered. Past Medical History:   Diagnosis Date    BPH (benign prostatic hypertrophy) 10/15/2012    ED (erectile dysfunction) 9/30/2011    Hepatitis C     HTN (hypertension) 9/30/2011    Hyperlipidemia 9/30/2011       Current Outpatient Medications   Medication Sig Dispense Refill    lisinopril (PRINIVIL, ZESTRIL) 40 mg tablet take 1 tablet by mouth once daily 90 Tab 3    amLODIPine (NORVASC) 5 mg tablet take 1 tablet by mouth twice a day 180 Tab 2    hydroCHLOROthiazide (HYDRODIURIL) 50 mg tablet Take 1 Tab by mouth daily. 90 Tab 3    triamcinolone acetonide (KENALOG) 0.1 % topical cream Apply  to affected area two (2) times a day. use thin layer 454 g 0    tadalafil (CIALIS) 10 mg tablet Take 1 Tab by mouth daily as needed. 4 Tab 3    urea (CARMOL) 40 % topical cream Apply  to affected area two (2) times a day. 85 g 0    aspirin 81 mg tablet Take 81 mg by mouth daily.  MULTIVITAMIN PO Take  by mouth daily. Social History   reports that he has quit smoking. He has never used smokeless tobacco.   reports that he does not drink alcohol. Family History  family history includes Alcohol abuse in his father; Hypertension in his brother, brother, father, and mother. Review of Systems  Except as stated above include:  Constitutional: Negative for fever, chills and malaise/fatigue. HEENT: No congestion or recent URI. Gastrointestinal: No nausea, vomiting, abdominal pain, bloody stools. Pulmonary:  Negative except as stated above. Cardiac:  Negative except as stated above. Musculoskeletal: Negative except as stated above. Neurological:  No localized symptoms. Skin:  Negative except as stated above. Psych:  Negative except as stated above. Endocrine:  Negative except as stated above. PHYSICAL EXAM  BP Readings from Last 3 Encounters:   09/05/19 170/80   03/19/19 138/78   02/08/19 152/82     Pulse Readings from Last 3 Encounters:   09/05/19 63   03/19/19 90   02/08/19 (!) 59     Wt Readings from Last 3 Encounters:   09/05/19 76.7 kg (169 lb)   03/19/19 74.4 kg (164 lb)   02/08/19 73.8 kg (162 lb 11.2 oz)     General:   Well developed, well groomed. Head/Neck:   No jugular venous distention     No carotid bruits. No evidence of xanthelasma. Lungs:   No respiratory distress. Clear bilaterally. Heart:    Regular rate and rhythm. Normal S1/S2. Palpation of heart with normal point of maximum impulse. No significant murmurs, rubs or gallops. Abdomen:   Soft and nontender.       No palpable abdominal mass or bruits. Extremities:   Intact peripheral pulses. No significant edema. Neurological:   Alert and oriented to person, place, time. No focal neurological deficit visually. Skin:   No obvious rash    Blood Pressure Metric:  Monitor recommended and adjustments stated if needed.

## 2019-09-05 NOTE — PATIENT INSTRUCTIONS
If you have not heard from the central scheduler to schedule your testing in 48 hours, please call [phone].

## 2019-09-17 ENCOUNTER — OFFICE VISIT (OUTPATIENT)
Dept: FAMILY MEDICINE CLINIC | Age: 64
End: 2019-09-17

## 2019-09-17 VITALS
OXYGEN SATURATION: 97 % | HEIGHT: 67 IN | DIASTOLIC BLOOD PRESSURE: 67 MMHG | RESPIRATION RATE: 16 BRPM | HEART RATE: 61 BPM | TEMPERATURE: 96.7 F | SYSTOLIC BLOOD PRESSURE: 144 MMHG | WEIGHT: 165 LBS | BODY MASS INDEX: 25.9 KG/M2

## 2019-09-17 DIAGNOSIS — Z23 ENCOUNTER FOR IMMUNIZATION: ICD-10-CM

## 2019-09-17 DIAGNOSIS — L30.9 ECZEMA, UNSPECIFIED TYPE: ICD-10-CM

## 2019-09-17 DIAGNOSIS — Z12.5 SCREENING FOR MALIGNANT NEOPLASM OF PROSTATE: ICD-10-CM

## 2019-09-17 DIAGNOSIS — B18.2 CHRONIC HEPATITIS C WITHOUT HEPATIC COMA (HCC): ICD-10-CM

## 2019-09-17 DIAGNOSIS — I10 ESSENTIAL HYPERTENSION: Primary | ICD-10-CM

## 2019-09-17 RX ORDER — TRIAMCINOLONE ACETONIDE 1 MG/G
CREAM TOPICAL 2 TIMES DAILY
Qty: 454 G | Refills: 0 | Status: SHIPPED | OUTPATIENT
Start: 2019-09-17 | End: 2022-10-04

## 2019-09-17 NOTE — PROGRESS NOTES
Chief Complaint   Patient presents with    [facility] Care     HTN      1. Have you been to the ER, urgent care clinic since your last visit? Hospitalized since your last visit? No    2. Have you seen or consulted any other health care providers outside of the [address] [place] [address] since your last visit? Include any pap smears or colon screening. No     HPI  Jewell Batista comes in to hospitals care. Hypertension: Patient is on lisinopril, amlodipine and HCTZ. Blood pressure today 144/67. Patient states that usually his blood pressure is stable. We will continue with the current treatment plan and he will keep a blood pressure log. I will follow-up at next visit. He denies headache, changes in vision or focal weakness. I will check labs. Dyslipidemia: We will check his lipid panel. I will follow-up with the results. He is doing lifestyle and dietary modification. Overweight: Patient has a BMI of 25.84. Continue lifestyle and dietary modification. Eczema: Patient has eczematous rash. He uses triamcinolone cream for this. He would like a refill of medication. Prescription is sent in. Type disease: Patient has a history of hepatitis C and this has been treated. We will request the records. He is followed up by the gastroenterologist.  Health maintenance: We will check a PSA today. Patient will be given the PCV 13 vaccine today. Past Medical History  Past Medical History:   Diagnosis Date    BPH (benign prostatic hypertrophy) 10/15/2012    ED (erectile dysfunction) 9/30/2011    Hepatitis C     HTN (hypertension) 9/30/2011    Hyperlipidemia 9/30/2011       Surgical History  Past Surgical History:   Procedure Laterality Date    HX ACL RECONSTRUCTION          Medications  Current Outpatient Medications   Medication Sig Dispense Refill    KRILL OIL PO Take 500 mg by mouth.  MILK THISTLE PO Take  by mouth.       triamcinolone acetonide (KENALOG) 0.1 % topical cream Apply  to affected area two (2) times a day. use thin layer 454 g 0    lisinopril (PRINIVIL, ZESTRIL) 40 mg tablet take 1 tablet by mouth once daily 90 Tab 3    amLODIPine (NORVASC) 5 mg tablet take 1 tablet by mouth twice a day 180 Tab 2    hydroCHLOROthiazide (HYDRODIURIL) 50 mg tablet Take 1 Tab by mouth daily. 90 Tab 3    tadalafil (CIALIS) 10 mg tablet Take 1 Tab by mouth daily as needed. 4 Tab 3    urea (CARMOL) 40 % topical cream Apply  to affected area two (2) times a day. 85 g 0    aspirin 81 mg tablet Take 81 mg by mouth daily.  MULTIVITAMIN PO Take  by mouth daily. Allergies  Allergies   Allergen Reactions    Sean [Amlodipine-Olmesartan] Palpitations and Other (comments)     Fatigue, headache, pt states \"kidneys hurt\"    Atenolol Rash       Family History  Family History   Problem Relation Age of Onset    Hypertension Father     Alcohol abuse Father     Hypertension Mother     Hypertension Brother     Hypertension Brother        Social History  Social History     Socioeconomic History    Marital status:      Spouse name: Not on file    Number of children: Not on file    Years of education: Not on file    Highest education level: Not on file   Occupational History    Not on file   Social Needs    Financial resource strain: Not on file    Food insecurity:     Worry: Not on file     Inability: Not on file    Transportation needs:     Medical: Not on file     Non-medical: Not on file   Tobacco Use    Smoking status: Former Smoker    Smokeless tobacco: Never Used   Substance and Sexual Activity    Alcohol use:  Yes     Alcohol/week: 1.0 standard drinks     Types: 1 Cans of beer per week    Drug use: No    Sexual activity: Yes   Lifestyle    Physical activity:     Days per week: Not on file     Minutes per session: Not on file    Stress: Not on file   Relationships    Social connections:     Talks on phone: Not on file     Gets together: Not on file     Attends Gnosticism service: Not on file     Active member of club or organization: Not on file     Attends meetings of clubs or organizations: Not on file     Relationship status: Not on file    Intimate partner violence:     Fear of current or ex partner: Not on file     Emotionally abused: Not on file     Physically abused: Not on file     Forced sexual activity: Not on file   Other Topics Concern    Not on file   Social History Narrative    Not on file       Review of Systems  Review of Systems - Review of all systems is negative except as noted above in the HPI.     Vital Signs  Visit Vitals  /67 (BP 1 Location: Left arm, BP Patient Position: Sitting)   Pulse 61   Temp 96.7 °F (35.9 °C) (Oral)   Resp 16   Ht 5' 7\" (1.702 m)   Wt 165 lb (74.8 kg)   SpO2 97%   BMI 25.84 kg/m²         Physical Exam  Physical Examination: General appearance - alert, well appearing, and in no distress, oriented to person, place, and time, acyanotic, in no respiratory distress and well hydrated  Mental status - alert, oriented to person, place, and time, affect appropriate to mood  Ears - bilateral TM's and external ear canals normal  Nose - normal and patent, no erythema, discharge or polyps  Mouth - mucous membranes moist, pharynx normal without lesions  Neck - supple, no significant adenopathy  Lymphatics - no palpable lymphadenopathy, no hepatosplenomegaly  Chest - clear to auscultation, no wheezes, rales or rhonchi, symmetric air entry  Heart - normal rate, regular rhythm, normal S1, S2, no murmurs, rubs, clicks or gallops  Abdomen - soft, nontender, nondistended, no masses or organomegaly  Back exam - full range of motion, no tenderness, palpable spasm or pain on motion  Neurological - alert, oriented, normal speech, no focal findings or movement disorder noted  Musculoskeletal - full range of motion without pain  Extremities - intact peripheral pulses  Skin - DERMATITIS NOTED: eczematoid dermatitis on face, upper and lower extremities. Results  Results for orders placed or performed during the hospital encounter of 11/12/18   [address] [place] [address]. Result Value Ref Range    [facility] Specimens collected/sent to [facility]         ASSESSMENT and PLAN    ICD-10-CM ICD-9-CM    1. Essential hypertension I10 401.9 CBC WITH AUTOMATED DIFF      METABOLIC PANEL, COMPREHENSIVE      LIPID PANEL      LIPID PANEL      METABOLIC PANEL, COMPREHENSIVE      CBC WITH AUTOMATED DIFF   2. Eczema, unspecified type L30.9 692.9 triamcinolone acetonide (KENALOG) 0.1 % topical cream   3. Chronic hepatitis C without hepatic coma (HCC) B18.2 070.54    4. Encounter for immunization Z23 V03.89 PNEUMOCOCCAL CONJ VACCINE 13 VALENT IM   5. Screening for malignant neoplasm of prostate Z12.5 V76.44 PSA SCREENING (SCREENING)      PSA SCREENING (SCREENING)     lab results and schedule of future lab studies reviewed with patient  reviewed diet, exercise and weight control  cardiovascular risk and specific lipid/LDL goals reviewed  reviewed medications and side effects in detail  Discussed the patient's BMI with him. The BMI follow up plan is as follows:   dietary management education, guidance, and counseling  encourage exercise  monitor weight    I have discussed the diagnosis with the patient and the intended plan of care as seen in the above orders. The patient has received an after-visit summary and questions were answered concerning future plans. I have discussed medication, side effects, and warnings with the patient in detail. The patient verbalized understanding and is in agreement with the plan of care. The patient will contact the office with any additional concerns. Werner Meyer MD    PLEASE NOTE:   This document has been produced using voice recognition software.  Unrecognized errors in transcription may be present

## 2019-09-17 NOTE — PATIENT INSTRUCTIONS
Body Mass Index: Care Instructions  Your Care Instructions    Body mass index (BMI) can help you see if your weight is raising your risk for health problems. It uses a formula to compare how much you weigh with how tall you are. · A BMI lower than 18.5 is considered underweight. · A BMI between 18.5 and 24.9 is considered healthy. · A BMI between 25 and 29.9 is considered overweight. A BMI of 30 or higher is considered obese. If your BMI is in the normal range, it means that you have a lower risk for weight-related health problems. If your BMI is in the overweight or obese range, you may be at increased risk for weight-related health problems, such as high blood pressure, heart disease, stroke, arthritis or joint pain, and diabetes. If your BMI is in the underweight range, you may be at increased risk for health problems such as fatigue, lower protection (immunity) against illness, muscle loss, bone loss, hair loss, and hormone problems. BMI is just one measure of your risk for weight-related health problems. You may be at higher risk for health problems if you are not active, you eat an unhealthy diet, or you drink too much alcohol or use tobacco products. Follow-up care is a key part of your treatment and safety. Be sure to make and go to all appointments, and call your doctor if you are having problems. It's also a good idea to know your test results and keep a list of the medicines you take. How can you care for yourself at home? · Practice healthy eating habits. This includes eating plenty of fruits, vegetables, whole grains, lean protein, and low-fat dairy. · If your doctor recommends it, get more exercise. Walking is a good choice. Bit by bit, increase the amount you walk every day. Try for at least 30 minutes on most days of the week. · Do not smoke. Smoking can increase your risk for health problems. If you need help quitting, talk to your doctor about stop-smoking programs and medicines. These can increase your chances of quitting for good. · Limit alcohol to 2 drinks a day for men and 1 drink a day for women. Too much alcohol can cause health problems. If you have a BMI higher than 25  · Your doctor may do other tests to check your risk for weight-related health problems. This may include measuring the distance around your waist. A waist measurement of more than 40 inches in men or 35 inches in women can increase the risk of weight-related health problems. · Talk with your doctor about steps you can take to stay healthy or improve your health. You may need to make lifestyle changes to lose weight and stay healthy, such as changing your diet and getting regular exercise. If you have a BMI lower than 18.5  · Your doctor may do other tests to check your risk for health problems. · Talk with your doctor about steps you can take to stay healthy or improve your health. You may need to make lifestyle changes to gain or maintain weight and stay healthy, such as getting more healthy foods in your diet and doing exercises to build muscle. Where can you learn more? Go to http://magdiel-rom.info/. Enter S176 in the search box to learn more about \"Body Mass Index: Care Instructions. \"  Current as of: October 13, 2016  Content Version: 11.4  © 7820-5974 [employer]. Care instructions adapted under license by [employer] (which disclaims liability or warranty for this information). If you have questions about a medical condition or this instruction, always ask your healthcare professional. [address] any warranty or liability for your use of this information.

## 2019-09-18 LAB
A-G RATIO,AGRAT: 1.5 RATIO (ref 1.1–2.6)
ABSOLUTE LYMPHOCYTE COUNT, 10803: 2.5 K/UL (ref 1–4.8)
ALBUMIN SERPL-MCNC: 4.7 G/DL (ref 3.5–5)
ALP SERPL-CCNC: 71 U/L (ref 40–125)
ALT SERPL-CCNC: 18 U/L (ref 5–40)
ANION GAP SERPL CALC-SCNC: 17 MMOL/L
AST SERPL W P-5'-P-CCNC: 26 U/L (ref 10–37)
BASOPHILS # BLD: 0 K/UL (ref 0–0.2)
BASOPHILS NFR BLD: 0 % (ref 0–2)
BILIRUB SERPL-MCNC: 0.4 MG/DL (ref 0.2–1.2)
BUN SERPL-MCNC: 15 MG/DL (ref 6–22)
CALCIUM SERPL-MCNC: 10 MG/DL (ref 8.4–10.4)
CHLORIDE SERPL-SCNC: 100 MMOL/L (ref 98–110)
CHOLEST SERPL-MCNC: 215 MG/DL (ref 110–200)
CO2 SERPL-SCNC: 26 MMOL/L (ref 20–32)
CREAT SERPL-MCNC: 1 MG/DL (ref 0.8–1.6)
EOSINOPHIL # BLD: 0.1 K/UL (ref 0–0.5)
EOSINOPHIL NFR BLD: 2 % (ref 0–6)
ERYTHROCYTE [DISTWIDTH] IN BLOOD BY AUTOMATED COUNT: 12.3 % (ref 10–15.5)
GFRAA, 66117: >60
GFRNA, 66118: >60
GLOBULIN,GLOB: 3.1 G/DL (ref 2–4)
GLUCOSE SERPL-MCNC: 92 MG/DL (ref 70–99)
GRANULOCYTES,GRANS: 44 % (ref 40–75)
HCT VFR BLD AUTO: 47.3 % (ref 37.8–52.2)
HDLC SERPL-MCNC: 42 MG/DL (ref 40–59)
HDLC SERPL-MCNC: 5.1 MG/DL (ref 0–5)
HGB BLD-MCNC: 15.4 G/DL (ref 12.6–17.1)
LDLC SERPL CALC-MCNC: 153 MG/DL (ref 50–99)
LYMPHOCYTES, LYMLT: 45 % (ref 20–45)
MCH RBC QN AUTO: 30 PG (ref 26–34)
MCHC RBC AUTO-ENTMCNC: 33 G/DL (ref 31–36)
MCV RBC AUTO: 92 FL (ref 80–95)
MONOCYTES # BLD: 0.5 K/UL (ref 0.1–1)
MONOCYTES NFR BLD: 9 % (ref 3–12)
NEUTROPHILS # BLD AUTO: 2.4 K/UL (ref 1.8–7.7)
PLATELET # BLD AUTO: 286 K/UL (ref 140–440)
PMV BLD AUTO: 10.8 FL (ref 9–13)
POTASSIUM SERPL-SCNC: 4.6 MMOL/L (ref 3.5–5.5)
PROT SERPL-MCNC: 7.8 G/DL (ref 6.2–8.1)
PSA SERPL-MCNC: 0.79 NG/ML
RBC # BLD AUTO: 5.14 M/UL (ref 3.8–5.8)
SODIUM SERPL-SCNC: 143 MMOL/L (ref 133–145)
TRIGL SERPL-MCNC: 101 MG/DL (ref 40–149)
VLDLC SERPL CALC-MCNC: 20 MG/DL (ref 8–30)
WBC # BLD AUTO: 5.5 K/UL (ref 4–11)

## 2019-09-18 NOTE — PROGRESS NOTES
Please let patient know his LDL cholesterol is elevated at 153. He should exercise and take a diet low and polysaturated fats. Would also recommend taking Lipitor 20 mg daily to help bring this down. If he is willing he should let me know and I will send in a prescription for this medication. Rest of his labs are stable.   Landy Suarez MD

## 2019-09-25 ENCOUNTER — TELEPHONE (OUTPATIENT)
Dept: FAMILY MEDICINE CLINIC | Age: 64
End: 2019-09-25

## 2019-09-25 NOTE — TELEPHONE ENCOUNTER
----- Message from Kamla Berg MD sent at 9/18/2019  5:32 PM EDT -----  Please let patient know his LDL cholesterol is elevated at 153. He should exercise and take a diet low and polysaturated fats. Would also recommend taking Lipitor 20 mg daily to help bring this down. If he is willing he should let me know and I will send in a prescription for this medication. Rest of his labs are stable.   Kamla Berg MD

## 2019-10-01 ENCOUNTER — HOSPITAL ENCOUNTER (OUTPATIENT)
Dept: NON INVASIVE DIAGNOSTICS | Age: 64
Discharge: HOME | End: 2019-10-01
Attending: INTERNAL MEDICINE
Payer: COMMERCIAL

## 2019-10-01 VITALS
WEIGHT: 165 LBS | SYSTOLIC BLOOD PRESSURE: 144 MMHG | HEIGHT: 67 IN | BODY MASS INDEX: 25.9 KG/M2 | DIASTOLIC BLOOD PRESSURE: 67 MMHG

## 2019-10-01 DIAGNOSIS — I10 ESSENTIAL HYPERTENSION: ICD-10-CM

## 2019-10-01 LAB
ECHO AO ROOT DIAM: 2.99 CM
ECHO LA AREA 4C: 13.2 CM2
ECHO LA VOL 2C: 17.96 ML (ref 18–58)
ECHO LA VOL 4C: 30.68 ML (ref 18–58)
ECHO LA VOL BP: 25.46 ML (ref 18–58)
ECHO LA VOL/BSA BIPLANE: 13.66 ML/M2 (ref 16–28)
ECHO LA VOLUME INDEX A2C: 9.64 ML/M2 (ref 16–28)
ECHO LA VOLUME INDEX A4C: 16.47 ML/M2 (ref 16–28)
ECHO LV E' LATERAL VELOCITY: 11.07 CM/S
ECHO LV E' SEPTAL VELOCITY: 7.76 CM/S
ECHO LV INTERNAL DIMENSION DIASTOLIC: 4.69 CM (ref 4.2–5.9)
ECHO LV INTERNAL DIMENSION SYSTOLIC: 2.79 CM
ECHO LV IVSD: 0.74 CM (ref 0.6–1)
ECHO LV MASS 2D: 146.2 G (ref 88–224)
ECHO LV MASS INDEX 2D: 78.5 G/M2 (ref 49–115)
ECHO LV POSTERIOR WALL DIASTOLIC: 0.93 CM (ref 0.6–1)
ECHO LVOT DIAM: 1.75 CM
ECHO LVOT PEAK GRADIENT: 3.5 MMHG
ECHO LVOT PEAK VELOCITY: 94 CM/S
ECHO LVOT VTI: 22.34 CM
ECHO MV A VELOCITY: 71.15 CM/S
ECHO MV E DECELERATION TIME (DT): 165.6 MS
ECHO MV E VELOCITY: 74.19 CM/S
ECHO MV E/A RATIO: 1.04
ECHO MV E/E' LATERAL: 6.7
ECHO MV E/E' RATIO (AVERAGED): 8.13
ECHO MV E/E' SEPTAL: 9.56
ECHO RV TAPSE: 1.78 CM (ref 1.5–2)
ECHO TV REGURGITANT MAX VELOCITY: 229.7 CM/S
ECHO TV REGURGITANT PEAK GRADIENT: 21.1 MMHG

## 2019-10-01 PROCEDURE — 93306 TTE W/DOPPLER COMPLETE: CPT

## 2019-10-03 ENCOUNTER — CLINICAL SUPPORT (OUTPATIENT)
Dept: FAMILY MEDICINE CLINIC | Age: 64
End: 2019-10-03

## 2019-10-03 DIAGNOSIS — Z23 ENCOUNTER FOR IMMUNIZATION: Primary | ICD-10-CM

## 2019-10-14 RX ORDER — HYDROCHLOROTHIAZIDE 50 MG/1
50 TABLET ORAL DAILY
Qty: 90 TAB | Refills: 3 | Status: SHIPPED | OUTPATIENT
Start: 2019-10-14 | End: 2020-01-30 | Stop reason: SDUPTHER

## 2019-10-16 ENCOUNTER — OFFICE VISIT (OUTPATIENT)
Dept: FAMILY MEDICINE CLINIC | Age: 64
End: 2019-10-16

## 2019-10-16 VITALS
DIASTOLIC BLOOD PRESSURE: 74 MMHG | RESPIRATION RATE: 20 BRPM | BODY MASS INDEX: 26.37 KG/M2 | OXYGEN SATURATION: 99 % | SYSTOLIC BLOOD PRESSURE: 134 MMHG | WEIGHT: 168 LBS | HEIGHT: 67 IN | HEART RATE: 70 BPM | TEMPERATURE: 96.9 F

## 2019-10-16 DIAGNOSIS — B86 SCABIES INFESTATION: ICD-10-CM

## 2019-10-16 DIAGNOSIS — I10 ESSENTIAL HYPERTENSION: ICD-10-CM

## 2019-10-16 DIAGNOSIS — E78.5 HYPERLIPIDEMIA, UNSPECIFIED HYPERLIPIDEMIA TYPE: Primary | ICD-10-CM

## 2019-10-16 DIAGNOSIS — R21 RASH AND NONSPECIFIC SKIN ERUPTION: ICD-10-CM

## 2019-10-16 NOTE — PROGRESS NOTES
Chief Complaint   Patient presents with    Follow Up Chronic Condition     1. Have you been to the ER, urgent care clinic since your last visit? Hospitalized since your last visit?no    2. Have you seen or consulted any other health care providers outside of the [address] [place] [address] since your last visit? Include any pap smears or colon screening. No     hospitals  Brittney Rosales comes in for follow-up care. Patient has hypertension. He is on HCTZ, lisinopril and amlodipine. Blood pressure is stable. We will continue with the current treatment plan. I checked his labs last time he was here and that these are stable. Patient has dyslipidemia. LDL is elevated. We discussed taking cholesterol lowering medication. He would prefer to do lifestyle and dietary modification. We will recheck labs in 3 months. Patient declines to have the flu vaccine. Overall he is stable. Past Medical History  Past Medical History:   Diagnosis Date    BPH (benign prostatic hypertrophy) 10/15/2012    ED (erectile dysfunction) 9/30/2011    Hepatitis C     HTN (hypertension) 9/30/2011    Hyperlipidemia 9/30/2011       Surgical History  Past Surgical History:   Procedure Laterality Date    HX ACL RECONSTRUCTION          Medications  Current Outpatient Medications   Medication Sig Dispense Refill    hydroCHLOROthiazide (HYDRODIURIL) 50 mg tablet Take 1 Tab by mouth daily. 90 Tab 3    triamcinolone acetonide (KENALOG) 0.1 % topical cream Apply  to affected area two (2) times a day. use thin layer 454 g 0    lisinopril (PRINIVIL, ZESTRIL) 40 mg tablet take 1 tablet by mouth once daily 90 Tab 3    amLODIPine (NORVASC) 5 mg tablet take 1 tablet by mouth twice a day (Patient taking differently: 10 mg daily. take 1 tablet by mouth twice a day) 180 Tab 2    tadalafil (CIALIS) 10 mg tablet Take 1 Tab by mouth daily as needed. 4 Tab 3    urea (CARMOL) 40 % topical cream Apply  to affected area two (2) times a day.  85 g 0  KRILL OIL PO Take 500 mg by mouth.  MILK THISTLE PO Take  by mouth.  aspirin 81 mg tablet Take 81 mg by mouth daily.  MULTIVITAMIN PO Take  by mouth daily. Allergies  Allergies   Allergen Reactions    Sean [Amlodipine-Olmesartan] Palpitations and Other (comments)     Fatigue, headache, pt states \"kidneys hurt\"    Atenolol Rash       Family History  Family History   Problem Relation Age of Onset    Hypertension Father     Alcohol abuse Father     Hypertension Mother     Hypertension Brother     Hypertension Brother        Social History  Social History     Socioeconomic History    Marital status:      Spouse name: Not on file    Number of children: Not on file    Years of education: Not on file    Highest education level: Not on file   Occupational History    Not on file   Social Needs    Financial resource strain: Not on file    Food insecurity:     Worry: Not on file     Inability: Not on file    Transportation needs:     Medical: Not on file     Non-medical: Not on file   Tobacco Use    Smoking status: Former Smoker    Smokeless tobacco: Never Used   Substance and Sexual Activity    Alcohol use:  Yes     Alcohol/week: 1.0 standard drinks     Types: 1 Cans of beer per week    Drug use: No    Sexual activity: Yes   Lifestyle    Physical activity:     Days per week: Not on file     Minutes per session: Not on file    Stress: Not on file   Relationships    Social connections:     Talks on phone: Not on file     Gets together: Not on file     Attends Mu-ism service: Not on file     Active member of club or organization: Not on file     Attends meetings of clubs or organizations: Not on file     Relationship status: Not on file    Intimate partner violence:     Fear of current or ex partner: Not on file     Emotionally abused: Not on file     Physically abused: Not on file     Forced sexual activity: Not on file   Other Topics Concern    Not on file   Social History Narrative    Not on file       Review of Systems  Review of Systems - Review of all systems is negative except as noted above in the HPI.     Vital Signs  Visit Vitals  /74 (BP 1 Location: Left arm, BP Patient Position: Sitting)   Pulse 70   Temp 96.9 °F (36.1 °C) (Oral)   Resp 20   Ht 5' 7\" (1.702 m)   Wt 168 lb (76.2 kg)   SpO2 99%   BMI 26.31 kg/m²         Physical Exam  Physical Examination: General appearance - alert, well appearing, and in no distress, oriented to person, place, and time, overweight and acyanotic, in no respiratory distress  Mental status - alert, oriented to person, place, and time, affect appropriate to mood  Chest - clear to auscultation, no wheezes, rales or rhonchi, symmetric air entry  Heart - normal rate, regular rhythm, normal S1, S2, no murmurs, rubs, clicks or gallops  Neurological - motor and sensory grossly normal bilaterally  Musculoskeletal - full range of motion without pain  Extremities - intact peripheral pulses    Results  Results for orders placed or performed during the hospital encounter of 10/01/19   ECHO ADULT COMPLETE   Result Value Ref Range    LA Volume 25.46 18 - 58 mL    LV E' Lateral Velocity 11.07 cm/s    LV E' Septal Velocity 7.76 cm/s    Tapse 1.78 1.5 - 2.0 cm    Ao Root D 2.99 cm    LVIDd 4.69 4.2 - 5.9 cm    LVPWd 0.93 0.6 - 1.0 cm    LVIDs 2.79 cm    IVSd 0.74 0.6 - 1.0 cm    LVOT d 1.75 cm    LVOT Peak Velocity 94.00 cm/s    LVOT Peak Gradient 3.5 mmHg    LVOT VTI 22.34 cm    MV A Jose 71.15 cm/s    MV E Jose 74.19 cm/s    MV E/A 1.04     LA Vol 4C 30.68 18 - 58 mL    LA Vol 2C 17.96 (A) 18 - 58 mL    LA Area 4C 13.2 cm2    LV Mass .2 88 - 224 g    LV Mass AL Index 78.5 49 - 115 g/m2    E/E' lateral 6.70     E/E' septal 9.56     E/E' ratio (averaged) 8.13     Mitral Valve E Wave Deceleration Time 165.6 ms    Triscuspid Valve Regurgitation Peak Gradient 21.1 mmHg    TR Max Velocity 229.70 cm/s    LA Vol Index 13.66 16 - 28 ml/m2    LA Vol Index 9.64 16 - 28 ml/m2    LA Vol Index 16.47 16 - 28 ml/m2       ASSESSMENT and PLAN    ICD-10-CM ICD-9-CM    1. Hyperlipidemia, unspecified hyperlipidemia type E78.5 272.4    2. Essential hypertension I10 401.9      lab results and schedule of future lab studies reviewed with patient  reviewed diet, exercise and weight control  cardiovascular risk and specific lipid/LDL goals reviewed  reviewed medications and side effects in detail      I have discussed the diagnosis with the patient and the intended plan of care as seen in the above orders. The patient has received an after-visit summary and questions were answered concerning future plans. I have discussed medication, side effects, and warnings with the patient in detail. The patient verbalized understanding and is in agreement with the plan of care. The patient will contact the office with any additional concerns. Karolina Coleman MD    PLEASE NOTE:   This document has been produced using voice recognition software.  Unrecognized errors in transcription may be present

## 2019-12-19 RX ORDER — IVERMECTIN 3 MG/1
TABLET ORAL
Qty: 35 TAB | Refills: 0 | Status: SHIPPED | OUTPATIENT
Start: 2019-12-19 | End: 2022-08-30 | Stop reason: SDUPTHER

## 2019-12-19 RX ORDER — IVERMECTIN 3 MG/1
TABLET ORAL
Qty: 35 TAB | Refills: 0 | Status: SHIPPED | OUTPATIENT
Start: 2019-12-19 | End: 2019-12-19 | Stop reason: SDUPTHER

## 2019-12-19 RX ORDER — IVERMECTIN 10 MG/G
CREAM TOPICAL
Qty: 45 G | Refills: 1 | Status: SHIPPED | OUTPATIENT
Start: 2019-12-19 | End: 2022-10-04

## 2019-12-23 NOTE — TELEPHONE ENCOUNTER
Spoke with pharmacy at this time and yes patient was short 5 pills. Pharmacy states she will get the medication ready. For the cream I am waiting on determination of approval or denial.PA was submitted

## 2019-12-23 NOTE — TELEPHONE ENCOUNTER
Patient's wife called stating the pharmacy gave 6 days worth of medication instead of 7 days for treatment. Please call wife for clarification. She is asking that another day be sent in.

## 2019-12-24 NOTE — TELEPHONE ENCOUNTER
PA response letter received at this time stating that PA is not required at this time and medication is covered

## 2020-01-02 NOTE — PROGRESS NOTES
Patient has skin rash with tracks the linear on abdomen and lower extremities. These are similar to scabies rash. Wife also has scabies. Patient has a irritation and itching. They have tried various treatments especially topical treatments for scabies in the past.  Will need treatment for resistant scabies. He will also need to be followed up with a dermatologist.  He has rosacea for which he has been treated by the dermatologist.  This is not active at the moment.

## 2020-01-16 ENCOUNTER — OFFICE VISIT (OUTPATIENT)
Dept: FAMILY MEDICINE CLINIC | Age: 65
End: 2020-01-16

## 2020-01-16 VITALS
DIASTOLIC BLOOD PRESSURE: 76 MMHG | SYSTOLIC BLOOD PRESSURE: 133 MMHG | BODY MASS INDEX: 26.06 KG/M2 | OXYGEN SATURATION: 98 % | TEMPERATURE: 98.7 F | HEIGHT: 67 IN | WEIGHT: 166 LBS | HEART RATE: 75 BPM | RESPIRATION RATE: 16 BRPM

## 2020-01-16 DIAGNOSIS — E78.5 HYPERLIPIDEMIA, UNSPECIFIED HYPERLIPIDEMIA TYPE: ICD-10-CM

## 2020-01-16 DIAGNOSIS — B86 SCABIES: ICD-10-CM

## 2020-01-16 DIAGNOSIS — N52.9 ERECTILE DYSFUNCTION, UNSPECIFIED ERECTILE DYSFUNCTION TYPE: ICD-10-CM

## 2020-01-16 DIAGNOSIS — I10 ESSENTIAL HYPERTENSION: Primary | ICD-10-CM

## 2020-01-16 DIAGNOSIS — R21 RASH AND NONSPECIFIC SKIN ERUPTION: ICD-10-CM

## 2020-01-16 RX ORDER — PERMETHRIN 50 MG/G
CREAM TOPICAL
Qty: 120 G | Refills: 2 | Status: SHIPPED | OUTPATIENT
Start: 2020-01-16 | End: 2022-08-30 | Stop reason: SDUPTHER

## 2020-01-16 NOTE — PROGRESS NOTES
Chief Complaint   Patient presents with    Hypertension     1. Have you been to the ER, urgent care clinic since your last visit? Hospitalized since your last visit? No    2. Have you seen or consulted any other health care providers outside of the [address] [place] [address] since your last visit? Include any pap smears or colon screening. No     HPI  Sukhdev Riley comes in for follow-up care. Hypertension: Patient is on HCTZ, lisinopril and amlodipine. Blood pressure is stable. He denies headache, changes in vision or focal weakness. We will continue with the current treatment plan. Previous labs have been stable. Erectile dysfunction: Patient has Cialis that he uses as needed. Dermatology: Patient has rash. He has a rash that is similar to scabies rash. He also has been exposed to someone with scabies. The rash forms tracts on his skin and is itchy. Would like topical medication for this. I will give permethrin. He is on oral ivermectin. 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 Has been followed up by the dermatologist for this. He does get a rash that causes thickening of the skin especially lower extremities. Wife thinks that there is some of the scabies mites do hide underneath that skin. He has used urea and Kenalog cream for this. He does have a follow-up appointment to see a dermatologist.  I will follow-up with the recommendations. Patient is due for colonoscopy in March of this year.   States that he has this set up with a gastroenterologist.    Past Medical History  Past Medical History:   Diagnosis Date    BPH (benign prostatic hypertrophy) 10/15/2012    ED (erectile dysfunction) 9/30/2011    Hepatitis C     HTN (hypertension) 9/30/2011    Hyperlipidemia 9/30/2011       Surgical History  Past Surgical History:   Procedure Laterality Date    HX ACL RECONSTRUCTION          Medications  Current Outpatient Medications   Medication Sig Dispense Refill    permethrin (ACTICIN) 5 % topical cream apply sparingly as directed, Apply to entire body; leave on for 8 to 14 hours before washing off with water 120 g 2    hydroCHLOROthiazide (HYDRODIURIL) 50 mg tablet Take 1 Tab by mouth daily. 90 Tab 3    lisinopril (PRINIVIL, ZESTRIL) 40 mg tablet take 1 tablet by mouth once daily 90 Tab 3    amLODIPine (NORVASC) 5 mg tablet take 1 tablet by mouth twice a day (Patient taking differently: 10 mg daily. take 1 tablet by mouth twice a day) 180 Tab 2    tadalafil (CIALIS) 10 mg tablet Take 1 Tab by mouth daily as needed. 4 Tab 3    urea (CARMOL) 40 % topical cream Apply  to affected area two (2) times a day. 85 g 0    aspirin 81 mg tablet Take 81 mg by mouth daily.  MULTIVITAMIN PO Take  by mouth daily.  ivermectin (STROMECTOL) 3 mg tablet Take 15 mg ( 5 tabs) on days 0,1,7,8,14,21,28 35 Tab 0    ivermectin 1 % crea Apply to affected area thin layer every 2-3 days 45 g 1    KRILL OIL PO Take 500 mg by mouth.  MILK THISTLE PO Take  by mouth.  triamcinolone acetonide (KENALOG) 0.1 % topical cream Apply  to affected area two (2) times a day.  use thin layer 454 g 0       Allergies  Allergies   Allergen Reactions    Sean [Amlodipine-Olmesartan] Palpitations and Other (comments)     Fatigue, headache, pt states \"kidneys hurt\"    Atenolol Rash       Family History  Family History   Problem Relation Age of Onset    Hypertension Father     Alcohol abuse Father     Hypertension Mother     Hypertension Brother     Hypertension Brother        Social History  Social History     Socioeconomic History    Marital status:      Spouse name: Not on file    Number of children: Not on file    Years of education: Not on file    Highest education level: Not on file   Occupational History    Not on file   Social Needs    Financial resource strain: Not on file    Food insecurity:     Worry: Not on file     Inability: Not on file    Transportation needs:     Medical: Not on file Non-medical: Not on file   Tobacco Use    Smoking status: Former Smoker    Smokeless tobacco: Never Used   Substance and Sexual Activity    Alcohol use: Yes     Alcohol/week: 1.0 standard drinks     Types: 1 Cans of beer per week    Drug use: No    Sexual activity: Yes   Lifestyle    Physical activity:     Days per week: Not on file     Minutes per session: Not on file    Stress: Not on file   Relationships    Social connections:     Talks on phone: Not on file     Gets together: Not on file     Attends [employer] service: Not on file     Active member of club or organization: Not on file     Attends meetings of clubs or organizations: Not on file     Relationship status: Not on file    Intimate partner violence:     Fear of current or ex partner: Not on file     Emotionally abused: Not on file     Physically abused: Not on file     Forced sexual activity: Not on file   Other Topics Concern    Not on file   Social History Narrative    Not on file       Review of Systems  Review of Systems - Review of all systems is negative except as noted above in the HPI.     Vital Signs  Visit Vitals  /76 (BP 1 Location: Right arm, BP Patient Position: Sitting)   Pulse 75   Temp 98.7 °F (37.1 °C) (Oral)   Resp 16   Ht 5' 7\" (1.702 m)   Wt 166 lb (75.3 kg)   SpO2 98%   BMI 26.00 kg/m²         Physical Exam  Physical Examination: General appearance - alert, well appearing, and in no distress, oriented to person, place, and time and acyanotic, in no respiratory distress  Mental status - alert, oriented to person, place, and time, affect appropriate to mood  Mouth - mucous membranes moist, pharynx normal without lesions  Neck - supple, no significant adenopathy  Lymphatics - no palpable lymphadenopathy, no hepatosplenomegaly  Chest - clear to auscultation, no wheezes, rales or rhonchi, symmetric air entry  Heart - normal rate, regular rhythm, normal S1, S2, no murmurs, rubs, clicks or gallops  Abdomen - no rebound tenderness noted  Back exam - limited range of motion  Neurological - motor and sensory grossly normal bilaterally  Musculoskeletal - no joint tenderness, deformity or swelling  Extremities - intact peripheral pulses  Skin -patient has maculopapular rash lower extremities that is similar to ichthyosis. He has scratch marks with tracts lower extremities. Question of scabies. Results  Results for orders placed or performed during the hospital encounter of 10/01/19   ECHO ADULT COMPLETE   Result Value Ref Range    LA Volume 25.46 18 - 58 mL    LV E' Lateral Velocity 11.07 cm/s    LV E' Septal Velocity 7.76 cm/s    Tapse 1.78 1.5 - 2.0 cm    Ao Root D 2.99 cm    LVIDd 4.69 4.2 - 5.9 cm    LVPWd 0.93 0.6 - 1.0 cm    LVIDs 2.79 cm    IVSd 0.74 0.6 - 1.0 cm    LVOT d 1.75 cm    LVOT Peak Velocity 94.00 cm/s    LVOT Peak Gradient 3.5 mmHg    LVOT VTI 22.34 cm    MV A Jose 71.15 cm/s    MV E Jose 74.19 cm/s    MV E/A 1.04     LA Vol 4C 30.68 18 - 58 mL    LA Vol 2C 17.96 (A) 18 - 58 mL    LA Area 4C 13.2 cm2    LV Mass .2 88 - 224 g    LV Mass AL Index 78.5 49 - 115 g/m2    E/E' lateral 6.70     E/E' septal 9.56     E/E' ratio (averaged) 8.13     Mitral Valve E Wave Deceleration Time 165.6 ms    Triscuspid Valve Regurgitation Peak Gradient 21.1 mmHg    TR Max Velocity 229.70 cm/s    LA Vol Index 13.66 16 - 28 ml/m2    LA Vol Index 9.64 16 - 28 ml/m2    LA Vol Index 16.47 16 - 28 ml/m2       ASSESSMENT and PLAN    ICD-10-CM ICD-9-CM    1. Essential hypertension I10 401.9    2. Scabies B86 133.0 permethrin (ACTICIN) 5 % topical cream   3. Hyperlipidemia, unspecified hyperlipidemia type E78.5 272.4    4. Erectile dysfunction, unspecified erectile dysfunction type N52.9 607.84    5.  Rash and nonspecific skin eruption R21 782.1      lab results and schedule of future lab studies reviewed with patient  reviewed diet, exercise and weight control  cardiovascular risk and specific lipid/LDL goals reviewed  reviewed medications and side effects in detail      I have discussed the diagnosis with the patient and the intended plan of care as seen in the above orders. The patient has received an after-visit summary and questions were answered concerning future plans. I have discussed medication, side effects, and warnings with the patient in detail. The patient verbalized understanding and is in agreement with the plan of care. The patient will contact the office with any additional concerns. Luis Angel Bone MD    PLEASE NOTE:   This document has been produced using voice recognition software.  Unrecognized errors in transcription may be present

## 2020-01-30 RX ORDER — HYDROCHLOROTHIAZIDE 50 MG/1
50 TABLET ORAL DAILY
Qty: 90 TAB | Refills: 3 | Status: SHIPPED | OUTPATIENT
Start: 2020-01-30 | End: 2020-04-06 | Stop reason: SDUPTHER

## 2020-04-06 RX ORDER — HYDROCHLOROTHIAZIDE 50 MG/1
50 TABLET ORAL DAILY
Qty: 90 TAB | Refills: 3 | Status: SHIPPED | OUTPATIENT
Start: 2020-04-06 | End: 2020-06-10 | Stop reason: DRUGHIGH

## 2020-04-14 DIAGNOSIS — I10 ESSENTIAL HYPERTENSION: ICD-10-CM

## 2020-04-15 RX ORDER — AMLODIPINE BESYLATE 5 MG/1
10 TABLET ORAL DAILY
Qty: 180 TAB | Refills: 2 | Status: SHIPPED | OUTPATIENT
Start: 2020-04-15 | End: 2020-12-30 | Stop reason: SDUPTHER

## 2020-04-15 RX ORDER — LISINOPRIL 40 MG/1
TABLET ORAL
Qty: 90 TAB | Refills: 3 | Status: SHIPPED | OUTPATIENT
Start: 2020-04-15 | End: 2021-06-07 | Stop reason: SDUPTHER

## 2020-04-16 ENCOUNTER — VIRTUAL VISIT (OUTPATIENT)
Dept: FAMILY MEDICINE CLINIC | Age: 65
End: 2020-04-16

## 2020-04-16 DIAGNOSIS — I10 ESSENTIAL HYPERTENSION: Primary | ICD-10-CM

## 2020-04-16 DIAGNOSIS — E78.5 HYPERLIPIDEMIA, UNSPECIFIED HYPERLIPIDEMIA TYPE: ICD-10-CM

## 2020-04-16 DIAGNOSIS — L30.9 ECZEMA, UNSPECIFIED TYPE: ICD-10-CM

## 2020-04-16 NOTE — PROGRESS NOTES
Consent: Ollie Welch, who was seen by synchronous (real-time) audio-video technology, and/or his healthcare decision maker, is aware that this patient-initiated, Telehealth encounter on 4/16/2020 is a billable service, with coverage as determined by his insurance carrier. He is aware that he may receive a bill and has provided verbal consent to proceed: Yes. Assessment & Plan:       ICD-10-CM ICD-9-CM    1. Essential hypertension I10 401.9    2. Hyperlipidemia, unspecified hyperlipidemia type E78.5 272.4    3. Eczema, unspecified type L30.9 692.9        Subjective:   Ollie Welch is a 75 y.o. male who was seen for Hypertension    Patient seen for follow-up care. Patient has hypertension. He is on amlodipine, HCTZ and lisinopril. Denies headache, changes in vision or focal weakness. Has been tolerating the medication. Blood pressure has been stable. We will continue with the current treatment plan. Patient was due to have a colonoscopy but this has been postponed due to the viral pandemic. He denies cough, fever, or wheeze or shortness of breath. We will continue with the current management plan. I will follow-up with him in the clinic in 3 months. Patient has a history of eczema. Uses triamcinolone cream.  He will continue with this medication. He has dyslipidemia. Doing lifestyle and dietary modification. Plan to recheck lipid panel at next visit. Prior to Admission medications    Medication Sig Start Date End Date Taking? Authorizing Provider   lisinopriL (PRINIVIL, ZESTRIL) 40 mg tablet take 1 tablet by mouth daily 4/15/20   Luther Vazquez MD   amLODIPine (NORVASC) 5 mg tablet Take 2 Tabs by mouth daily. 4/15/20   Luther Vazquez MD   hydroCHLOROthiazide (HYDRODIURIL) 50 mg tablet Take 1 Tab by mouth daily.  4/6/20   Luther Vazquez MD   permethrin (ACTICIN) 5 % topical cream apply sparingly as directed, Apply to entire body; leave on for 8 to 14 hours before washing off with water 1/16/20   Luther Vazquez MD   ivermectin (STROMECTOL) 3 mg tablet Take 15 mg ( 5 tabs) on days 0,1,7,8,14,21,28 12/19/19   Luther Vazquez MD   ivermectin 1 % crea Apply to affected area thin layer every 2-3 days 12/19/19   Luther Vazquez MD   KRILL OIL PO Take 500 mg by mouth. Provider, Historical   MILK THISTLE PO Take  by mouth. Provider, Historical   triamcinolone acetonide (KENALOG) 0.1 % topical cream Apply  to affected area two (2) times a day. use thin layer 9/17/19   Luther Vazquez MD   tadalafil (CIALIS) 10 mg tablet Take 1 Tab by mouth daily as needed. 6/14/18   Tje Grover,    urea (CARMOL) 40 % topical cream Apply  to affected area two (2) times a day. 11/3/17   Juve Michael MD   aspirin 81 mg tablet Take 81 mg by mouth daily. Provider, Historical   MULTIVITAMIN PO Take  by mouth daily. Provider, Historical     Allergies   Allergen Reactions    Sean [Amlodipine-Olmesartan] Palpitations and Other (comments)     Fatigue, headache, pt states \"kidneys hurt\"    Atenolol Rash     ROS    Objective:   Vital Signs: (As obtained by patient/caregiver at home)  There were no vitals taken for this visit.      Constitutional: [x] Appears well-developed and well-nourished [x] No apparent distress    Mental status: [x] Alert and awake  [x] Oriented to person/place/time [x] Able to follow commands    HENT: [x] Normocephalic, atraumatic    Pulmonary/Chest: [x] Respiratory effort normal   [x] No visualized signs of difficulty breathing or respiratory distress          Musculoskeletal:   [x] Normal gait with no signs of ataxia         [x] Normal range of motion of neck         Neurological:        [x] No Facial Asymmetry (Cranial nerve 7 motor function) (limited exam due to video visit)          [x] No gaze palsy                Psychiatric:       [x] Normal Affect       [x] No Hallucinations    We discussed the expected course, resolution and complications of the diagnosis(es) in detail. Medication risks, benefits, costs, interactions, and alternatives were discussed as indicated. I advised him to contact the office if his condition worsens, changes or fails to improve as anticipated. He expressed understanding with the diagnosis(es) and plan. Neena Jordan is a 75 y.o. male being evaluated by a video visit encounter for concerns as above. A caregiver was present when appropriate. Due to this being a TeleHealth encounter (During [facility] public health emergency), evaluation of the following organ systems was limited: Vitals/Constitutional/EENT/Resp/CV/GI//MS/Neuro/Skin/Heme-Lymph-Imm. Pursuant to the emergency declaration under the [facility], [facility]5 waiver authority and the [employer] and [employer] Act, this Virtual  Visit was conducted, with patient's (and/or legal guardian's) consent, to reduce the patient's risk of exposure to COVID-19 and provide necessary medical care. Services were provided through a video synchronous discussion virtually to substitute for in-person clinic visit. Patient and provider were located at their individual homes.         Alejandro Dickens MD

## 2020-06-10 ENCOUNTER — TELEPHONE (OUTPATIENT)
Dept: FAMILY MEDICINE CLINIC | Age: 65
End: 2020-06-10

## 2020-06-10 DIAGNOSIS — M10.9 ACUTE GOUT, UNSPECIFIED CAUSE, UNSPECIFIED SITE: Primary | ICD-10-CM

## 2020-06-10 DIAGNOSIS — E78.5 HYPERLIPIDEMIA, UNSPECIFIED HYPERLIPIDEMIA TYPE: ICD-10-CM

## 2020-06-10 DIAGNOSIS — I10 ESSENTIAL HYPERTENSION: ICD-10-CM

## 2020-06-10 RX ORDER — PREDNISONE 10 MG/1
10 TABLET ORAL 2 TIMES DAILY
Qty: 10 TAB | Refills: 0 | Status: SHIPPED | OUTPATIENT
Start: 2020-06-10 | End: 2020-06-10

## 2020-06-10 RX ORDER — COLCHICINE 0.6 MG/1
CAPSULE ORAL
Qty: 45 CAP | Refills: 0 | Status: SHIPPED | OUTPATIENT
Start: 2020-06-10 | End: 2020-07-20 | Stop reason: SDUPTHER

## 2020-06-10 RX ORDER — NAPROXEN 500 MG/1
500 TABLET ORAL
Qty: 60 TAB | Refills: 0 | Status: SHIPPED | OUTPATIENT
Start: 2020-06-10 | End: 2020-09-16

## 2020-06-10 RX ORDER — DEXTROMETHORPHAN HYDROBROMIDE, GUAIFENESIN 5; 100 MG/5ML; MG/5ML
650 LIQUID ORAL
Qty: 90 TAB | Refills: 0 | Status: SHIPPED | OUTPATIENT
Start: 2020-06-10 | End: 2020-06-10 | Stop reason: ALTCHOICE

## 2020-06-10 RX ORDER — HYDROCHLOROTHIAZIDE 25 MG/1
25 TABLET ORAL DAILY
Qty: 30 TAB | Refills: 2 | Status: SHIPPED | OUTPATIENT
Start: 2020-06-10 | End: 2020-07-06

## 2020-06-10 NOTE — TELEPHONE ENCOUNTER
I have sent in prednisone to take twice a day for 5 days and Tylenol arthritis that he can take every 8 hours as needed for pain.   Ibis Barkley MD

## 2020-06-10 NOTE — TELEPHONE ENCOUNTER
Patient's wife called stating patient is experiencing gout. He is requesting medication for gout. Dr. Oanh Aguilar doesn't have an appointment until 6/30/20. He is unable to wait until then.

## 2020-06-10 NOTE — TELEPHONE ENCOUNTER
Verbal ordered received from  to call pharmacy to  cancel tylenol and  prednisone at this time. Tried to reach pharmacy to notify. Unable to reach pharmacy

## 2020-07-03 DIAGNOSIS — I10 ESSENTIAL HYPERTENSION: ICD-10-CM

## 2020-07-06 ENCOUNTER — TELEPHONE (OUTPATIENT)
Dept: FAMILY MEDICINE CLINIC | Age: 65
End: 2020-07-06

## 2020-07-06 DIAGNOSIS — I10 ESSENTIAL HYPERTENSION: Primary | ICD-10-CM

## 2020-07-06 DIAGNOSIS — Z12.5 SCREENING FOR MALIGNANT NEOPLASM OF PROSTATE: ICD-10-CM

## 2020-07-06 DIAGNOSIS — E78.5 HYPERLIPIDEMIA, UNSPECIFIED HYPERLIPIDEMIA TYPE: ICD-10-CM

## 2020-07-06 RX ORDER — HYDROCHLOROTHIAZIDE 25 MG/1
TABLET ORAL
Qty: 90 TAB | Refills: 1 | Status: SHIPPED | OUTPATIENT
Start: 2020-07-06 | End: 2021-07-07

## 2020-07-06 NOTE — TELEPHONE ENCOUNTER
Pt wife called to ask if Dr Reynaldo Judge can put in lab orders before his upcoming jaquelin so the results can be in by the time of his jaquelin 07/15. ..  Please contact pt for clarification when available

## 2020-07-08 ENCOUNTER — TELEPHONE (OUTPATIENT)
Dept: FAMILY MEDICINE CLINIC | Age: 65
End: 2020-07-08

## 2020-07-08 DIAGNOSIS — E61.1 IRON DEFICIENCY: ICD-10-CM

## 2020-07-08 DIAGNOSIS — M10.9 GOUT, UNSPECIFIED CAUSE, UNSPECIFIED CHRONICITY, UNSPECIFIED SITE: Primary | ICD-10-CM

## 2020-07-08 DIAGNOSIS — E55.9 HYPOVITAMINOSIS D: ICD-10-CM

## 2020-07-08 NOTE — TELEPHONE ENCOUNTER
I have added the vitamin D, uric acid and iron profile. We do not have a test known as cardiac profile. We will check his lipid panel. Also check his comprehensive metabolic panel.   Ginna Perez MD

## 2020-07-08 NOTE — TELEPHONE ENCOUNTER
Spoke with patient wife at this time. Patien wife is requesting uric acid ,vitamin d, and iron profile and cardiac profile to be added to labs.

## 2020-07-20 DIAGNOSIS — M10.9 ACUTE GOUT, UNSPECIFIED CAUSE, UNSPECIFIED SITE: ICD-10-CM

## 2020-07-20 NOTE — TELEPHONE ENCOUNTER
Requested Prescriptions     Pending Prescriptions Disp Refills    colchicine (MITIGARE) 0.6 mg capsule 45 Cap 0     Sig: Take 0.6 mg 2 x day for 14 days then 0.6 mg daily

## 2020-07-21 RX ORDER — COLCHICINE 0.6 MG/1
CAPSULE ORAL
Qty: 90 CAP | Refills: 0 | Status: SHIPPED | OUTPATIENT
Start: 2020-07-21 | End: 2022-01-26

## 2020-09-16 ENCOUNTER — OFFICE VISIT (OUTPATIENT)
Dept: CARDIOLOGY CLINIC | Age: 65
End: 2020-09-16

## 2020-09-16 VITALS
BODY MASS INDEX: 26.21 KG/M2 | HEART RATE: 68 BPM | OXYGEN SATURATION: 99 % | HEIGHT: 67 IN | DIASTOLIC BLOOD PRESSURE: 76 MMHG | WEIGHT: 167 LBS | SYSTOLIC BLOOD PRESSURE: 136 MMHG

## 2020-09-16 DIAGNOSIS — I10 ESSENTIAL HYPERTENSION: Primary | ICD-10-CM

## 2020-09-16 DIAGNOSIS — E78.5 HYPERLIPIDEMIA, UNSPECIFIED HYPERLIPIDEMIA TYPE: ICD-10-CM

## 2020-09-16 NOTE — PROGRESS NOTES
Dom Vásquez presents today for No chief complaint on file. Dom Vásquez preferred language for health care discussion is english/other. Is someone accompanying this pt? no    Is the patient using any DME equipment during [address]? no    Depression Screening:  3 most recent PHQ Screens 9/16/2020   Little interest or pleasure in doing things Not at all   Feeling down, depressed, irritable, or hopeless Not at all   Total Score PHQ 2 0       Learning Assessment:  Learning Assessment 9/16/2020   PRIMARY LEARNER Patient   HIGHEST LEVEL OF EDUCATION - PRIMARY LEARNER  -   BARRIERS PRIMARY LEARNER -     -   CO-LEARNER CAREGIVER -   PRIMARY LANGUAGE ENGLISH   LEARNER PREFERENCE PRIMARY DEMONSTRATION     -   ANSWERED BY patient   RELATIONSHIP SELF       Abuse Screening:  Abuse Screening Questionnaire 9/16/2020   Do you ever feel afraid of your partner? N   Are you in a relationship with someone who physically or mentally threatens you? N   Is it safe for you to go home? Y       Fall Risk  Fall Risk Assessment, last 12 mths 9/16/2020   Able to walk? Yes   Fall in past 12 months? No       Pt currently taking Anticoagulant therapy? no    Coordination of Care:  1. Have you been to the ER, urgent care clinic since your last visit? Hospitalized since your last visit? no    2. Have you seen or consulted any other health care providers outside of the [address] [place] [address] since your last visit? Include any pap smears or colon screening.  no

## 2020-09-16 NOTE — PROGRESS NOTES
Good postoperative appearance. HPI: A 78-year-old male here for follow up. I initially saw him as a self-referral for hypertension. He was on 3 medications and doing overall well. When I saw him in the past, he had a fair amount of salt in his diet and even loved his pistachios. He has tried to cut down. He has had no new chest pain, dyspnea, orthopnea, PND or edema. He is scheduled for a colonoscopy next week and he is going to have blood work with his primary care physician. Impression/Plan:  1. History of hypertension poorly controlled likely worsened since last year due to some dietary indiscretion with salt. His systolic blood pressure has not been more than 140 mmHg at home. He has been doing well. 2. History of BPH and erectile dysfunction. 3. Echocardiogram October 2019 with normal function, no significant LVH. 4. Remote hepatitis C with treatment. He remains on 3 agents for his blood pressure and has been much better controlled after he has cut out the salt from his diet. He is going to get his cholesterol checked and additional blood work with his primary care physician. He has had historically normal creatinine and unless this changes significantly or his blood pressure becomes more difficult to control, I would hold off on renal ultrasound but this could be considered. I see no contraindications to performing his colonoscopy next week. I will be available as needed.     Thank you kindly for allowing me to participate in this patient's care     Past Medical History:   Diagnosis Date    BPH (benign prostatic hypertrophy) 10/15/2012    ED (erectile dysfunction) 9/30/2011    Hepatitis C     HTN (hypertension) 9/30/2011    Hyperlipidemia 9/30/2011       Current Outpatient Medications   Medication Sig Dispense Refill    colchicine (MITIGARE) 0.6 mg capsule Take 0.6 mg  daily 90 Cap 0    hydroCHLOROthiazide (HYDRODIURIL) 25 mg tablet take 1 tablet by mouth once daily 90 Tab 1    lisinopriL (PRINIVIL, ZESTRIL) 40 mg tablet take 1 tablet by mouth daily 90 Tab 3    amLODIPine (NORVASC) 5 mg tablet Take 2 Tabs by mouth daily. 180 Tab 2    permethrin (ACTICIN) 5 % topical cream apply sparingly as directed, Apply to entire body; leave on for 8 to 14 hours before washing off with water 120 g 2    ivermectin (STROMECTOL) 3 mg tablet Take 15 mg ( 5 tabs) on days 0,1,7,8,14,21,28 35 Tab 0    ivermectin 1 % crea Apply to affected area thin layer every 2-3 days 45 g 1    triamcinolone acetonide (KENALOG) 0.1 % topical cream Apply  to affected area two (2) times a day. use thin layer 454 g 0    tadalafil (CIALIS) 10 mg tablet Take 1 Tab by mouth daily as needed. 4 Tab 3    urea (CARMOL) 40 % topical cream Apply  to affected area two (2) times a day. 85 g 0    aspirin 81 mg tablet Take 81 mg by mouth daily.  MULTIVITAMIN PO Take  by mouth daily. Social History   reports that he has quit smoking. He has never used smokeless tobacco.   reports current alcohol use of about 1.0 standard drinks of alcohol per week. Family History  family history includes Alcohol abuse in his father; Hypertension in his brother, brother, father, and mother. Review of Systems  Except as stated above include:  Constitutional: Negative for fever, chills and malaise/fatigue. HEENT: No congestion or recent URI. Gastrointestinal: No nausea, vomiting, abdominal pain, bloody stools. Pulmonary:  Negative except as stated above. Cardiac:  Negative except as stated above. Musculoskeletal: Negative except as stated above. Neurological:  No localized symptoms. Skin:  Negative except as stated above. Psych:  Negative except as stated above. Endocrine:  Negative except as stated above.     PHYSICAL EXAM  BP Readings from Last 3 Encounters:   09/16/20 136/76   01/16/20 133/76   10/16/19 134/74     Pulse Readings from Last 3 Encounters:   09/16/20 68   01/16/20 75   10/16/19 70     Wt Readings from Last 3 Encounters:   09/16/20 75.8 kg (167 lb) 01/16/20 75.3 kg (166 lb)   10/16/19 76.2 kg (168 lb)     General:   Well developed, well groomed. Head/Neck:   No jugular venous distention     No carotid bruits. No evidence of xanthelasma. Lungs:   No respiratory distress. Clear bilaterally. Heart:    Regular rate and rhythm. Normal S1/S2. Palpation of heart with normal point of maximum impulse. No significant murmurs, rubs or gallops. Abdomen:   Soft and nontender. No palpable abdominal mass or bruits. Extremities:   Intact peripheral pulses. No significant edema. Neurological:   Alert and oriented to person, place, time. No focal neurological deficit visually. Skin:   No obvious rash    Blood Pressure Metric:  Monitor recommended and adjustments stated if needed.

## 2020-09-18 ENCOUNTER — HOSPITAL ENCOUNTER (OUTPATIENT)
Dept: LAB | Age: 65
Discharge: HOME | End: 2020-09-18

## 2020-09-18 LAB — SENTARA SPECIMEN COL,SENBCF: NORMAL

## 2020-09-18 PROCEDURE — 99001 SPECIMEN HANDLING PT-LAB: CPT

## 2020-09-19 LAB
25(OH)D3 SERPL-MCNC: 46.1 NG/ML (ref 32–100)
A-G RATIO,AGRAT: 1.8 RATIO (ref 1.1–2.6)
ABSOLUTE LYMPHOCYTE COUNT, 10803: 2.4 K/UL (ref 1–4.8)
ALBUMIN SERPL-MCNC: 4.7 G/DL (ref 3.5–5)
ALP SERPL-CCNC: 71 U/L (ref 40–125)
ALT SERPL-CCNC: 12 U/L (ref 5–40)
ANION GAP SERPL CALC-SCNC: 12.3 MMOL/L (ref 3–15)
AST SERPL W P-5'-P-CCNC: 17 U/L (ref 10–37)
BASOPHILS # BLD: 0 K/UL (ref 0–0.2)
BASOPHILS NFR BLD: 1 % (ref 0–2)
BILIRUB SERPL-MCNC: 0.3 MG/DL (ref 0.2–1.2)
BUN SERPL-MCNC: 22 MG/DL (ref 6–22)
CALCIUM SERPL-MCNC: 9.9 MG/DL (ref 8.4–10.5)
CHLORIDE SERPL-SCNC: 100 MMOL/L (ref 98–110)
CHOLEST SERPL-MCNC: 223 MG/DL (ref 110–200)
CO2 SERPL-SCNC: 28 MMOL/L (ref 20–32)
CREAT SERPL-MCNC: 1 MG/DL (ref 0.8–1.6)
EOSINOPHIL # BLD: 0.1 K/UL (ref 0–0.5)
EOSINOPHIL NFR BLD: 2 % (ref 0–6)
ERYTHROCYTE [DISTWIDTH] IN BLOOD BY AUTOMATED COUNT: 12.8 % (ref 10–15.5)
FE % SATURATION,PSAT: 22 % (ref 20–50)
GFRAA, 66117: >60
GFRNA, 66118: >60
GLOBULIN,GLOB: 2.6 G/DL (ref 2–4)
GLUCOSE SERPL-MCNC: 97 MG/DL (ref 70–99)
GRANULOCYTES,GRANS: 46 % (ref 40–75)
HCT VFR BLD AUTO: 45.6 % (ref 37.8–52.2)
HDLC SERPL-MCNC: 42 MG/DL
HDLC SERPL-MCNC: 5.3 MG/DL (ref 0–5)
HGB BLD-MCNC: 14.7 G/DL (ref 12.6–17.1)
IRON,IRN: 66 MCG/DL (ref 45–160)
LDL/HDL RATIO,LDHD: 4
LDLC SERPL CALC-MCNC: 167 MG/DL (ref 50–99)
LYMPHOCYTES, LYMLT: 41 % (ref 20–45)
MCH RBC QN AUTO: 30 PG (ref 26–34)
MCHC RBC AUTO-ENTMCNC: 32 G/DL (ref 31–36)
MCV RBC AUTO: 93 FL (ref 80–95)
MONOCYTES # BLD: 0.6 K/UL (ref 0.1–1)
MONOCYTES NFR BLD: 10 % (ref 3–12)
NEUTROPHILS # BLD AUTO: 2.7 K/UL (ref 1.8–7.7)
NON-HDL CHOLESTEROL, 011976: 181 MG/DL
PLATELET # BLD AUTO: 298 K/UL (ref 140–440)
PMV BLD AUTO: 10.6 FL (ref 9–13)
POTASSIUM SERPL-SCNC: 4 MMOL/L (ref 3.5–5.5)
PROT SERPL-MCNC: 7.3 G/DL (ref 6.2–8.1)
PSA SERPL-MCNC: 1.09 NG/ML
RBC # BLD AUTO: 4.88 M/UL (ref 3.8–5.8)
SODIUM SERPL-SCNC: 140 MMOL/L (ref 133–145)
TIBC,TIBC: 296 MCG/DL (ref 228–428)
TRIGL SERPL-MCNC: 72 MG/DL (ref 40–149)
UIBC SERPL-MCNC: 230 MCG/DL (ref 110–370)
URATE SERPL-MCNC: 9.7 MG/DL (ref 3.9–9)
VLDLC SERPL CALC-MCNC: 14 MG/DL (ref 8–30)
WBC # BLD AUTO: 5.9 K/UL (ref 4–11)

## 2020-09-29 ENCOUNTER — OFFICE VISIT (OUTPATIENT)
Dept: FAMILY MEDICINE CLINIC | Age: 65
End: 2020-09-29
Payer: COMMERCIAL

## 2020-09-29 VITALS
BODY MASS INDEX: 26.16 KG/M2 | OXYGEN SATURATION: 97 % | TEMPERATURE: 97.9 F | SYSTOLIC BLOOD PRESSURE: 132 MMHG | HEART RATE: 77 BPM | DIASTOLIC BLOOD PRESSURE: 67 MMHG | RESPIRATION RATE: 18 BRPM | HEIGHT: 67 IN

## 2020-09-29 DIAGNOSIS — E78.5 DYSLIPIDEMIA: ICD-10-CM

## 2020-09-29 DIAGNOSIS — E79.0 HYPERURICEMIA: ICD-10-CM

## 2020-09-29 DIAGNOSIS — I10 ESSENTIAL HYPERTENSION: Primary | ICD-10-CM

## 2020-09-29 DIAGNOSIS — B18.2 CHRONIC HEPATITIS C WITHOUT HEPATIC COMA (HCC): ICD-10-CM

## 2020-09-29 DIAGNOSIS — Z23 ENCOUNTER FOR IMMUNIZATION: ICD-10-CM

## 2020-09-29 PROCEDURE — 90694 VACC AIIV4 NO PRSRV 0.5ML IM: CPT | Performed by: FAMILY MEDICINE

## 2020-09-29 PROCEDURE — 99214 OFFICE O/P EST MOD 30 MIN: CPT | Performed by: FAMILY MEDICINE

## 2020-09-29 PROCEDURE — 90472 IMMUNIZATION ADMIN EACH ADD: CPT | Performed by: FAMILY MEDICINE

## 2020-09-29 PROCEDURE — 90732 PPSV23 VACC 2 YRS+ SUBQ/IM: CPT | Performed by: FAMILY MEDICINE

## 2020-09-29 PROCEDURE — 90471 IMMUNIZATION ADMIN: CPT | Performed by: FAMILY MEDICINE

## 2020-09-29 RX ORDER — ATORVASTATIN CALCIUM 20 MG/1
20 TABLET, FILM COATED ORAL DAILY
Qty: 90 TAB | Refills: 1 | Status: CANCELLED | OUTPATIENT
Start: 2020-09-29

## 2020-09-29 RX ORDER — ATORVASTATIN CALCIUM 10 MG/1
10 TABLET, FILM COATED ORAL DAILY
Qty: 30 TAB | Refills: 3 | Status: SHIPPED | OUTPATIENT
Start: 2020-09-29 | End: 2021-02-11 | Stop reason: DRUGHIGH

## 2020-09-29 NOTE — PROGRESS NOTES
PSV23 and Flu vaccine  given to patient left deltoid at this time. Patient tolerated well. No other concerns noted at this time

## 2020-09-29 NOTE — PROGRESS NOTES
Chief Complaint   Patient presents with    Follow-up     1. Have you been to the ER, urgent care clinic since your last visit? Hospitalized since your last visit? No    2. Have you seen or consulted any other health care providers outside of the [address] [place] [address] since your last visit? Include any pap smears or colon screening. No     HPI  Narinder Delgado comes in for follow-up care. Hypertension: Patient has hypertension. Blood pressure is stable. He is on amlodipine, lisinopril and HCTZ. Denies headache, changes in vision or focal weakness. We will continue with the current treatment plan. Hyperuricemia: Patient has a history of hyperuricemia. We did check his uric acid levels and is elevated at 9.7. I will have him take colchicine 0.6 mg daily. He has not been taking this medication. We will follow-up at next visit. Dyslipidemia: Patient has dyslipidemia. We discussed lifestyle and dietary modification. Discussed exercise and taking a diet low in polysaturated fats. I will start him on Lipitor 10 mg daily. I will follow-up in 3 to 6 months for lipid check. Chronic hepatitis C: Patient has a history of chronic hepatitis C. This was treated with resolution. Liver function tests stable. Health maintenance: Patient will get the flu vaccine and the pneumonia vaccine today. Past Medical History  Past Medical History:   Diagnosis Date    BPH (benign prostatic hypertrophy) 10/15/2012    ED (erectile dysfunction) 9/30/2011    Hepatitis C     HTN (hypertension) 9/30/2011    Hyperlipidemia 9/30/2011       Surgical History  Past Surgical History:   Procedure Laterality Date    HX ACL RECONSTRUCTION          Medications  Current Outpatient Medications   Medication Sig Dispense Refill    atorvastatin (LIPITOR) 10 mg tablet Take 1 Tab by mouth daily.  30 Tab 3    hydroCHLOROthiazide (HYDRODIURIL) 25 mg tablet take 1 tablet by mouth once daily 90 Tab 1    lisinopriL (Catheryn Chi) 40 mg tablet take 1 tablet by mouth daily 90 Tab 3    amLODIPine (NORVASC) 5 mg tablet Take 2 Tabs by mouth daily. 180 Tab 2    ivermectin (STROMECTOL) 3 mg tablet Take 15 mg ( 5 tabs) on days 0,1,7,8,14,21,28 35 Tab 0    aspirin 81 mg tablet Take 81 mg by mouth daily.  MULTIVITAMIN PO Take  by mouth daily.  colchicine (MITIGARE) 0.6 mg capsule Take 0.6 mg  daily 90 Cap 0    permethrin (ACTICIN) 5 % topical cream apply sparingly as directed, Apply to entire body; leave on for 8 to 14 hours before washing off with water 120 g 2    ivermectin 1 % crea Apply to affected area thin layer every 2-3 days 45 g 1    triamcinolone acetonide (KENALOG) 0.1 % topical cream Apply  to affected area two (2) times a day. use thin layer 454 g 0    tadalafil (CIALIS) 10 mg tablet Take 1 Tab by mouth daily as needed. 4 Tab 3    urea (CARMOL) 40 % topical cream Apply  to affected area two (2) times a day.  85 g 0       Allergies  Allergies   Allergen Reactions    Sean [Amlodipine-Olmesartan] Palpitations and Other (comments)     Fatigue, headache, pt states \"kidneys hurt\"    Atenolol Rash    Miralax [Polyethylene Glycol 3350] Swelling       Family History  Family History   Problem Relation Age of Onset    Hypertension Father     Alcohol abuse Father     Hypertension Mother     Hypertension Brother     Hypertension Brother        Social History  Social History     Socioeconomic History    Marital status:      Spouse name: Not on file    Number of children: Not on file    Years of education: Not on file    Highest education level: Not on file   Occupational History    Not on file   Social Needs    Financial resource strain: Not on file    Food insecurity     Worry: Not on file     Inability: Not on file    Transportation needs     Medical: Not on file     Non-medical: Not on file   Tobacco Use    Smoking status: Former Smoker    Smokeless tobacco: Never Used   Substance and Sexual Activity    Alcohol use: Yes     Alcohol/week: 1.0 standard drinks     Types: 1 Cans of beer per week    Drug use: No    Sexual activity: Yes   Lifestyle    Physical activity     Days per week: Not on file     Minutes per session: Not on file    Stress: Not on file   Relationships    Social connections     Talks on phone: Not on file     Gets together: Not on file     Attends Gnosticist service: Not on file     Active member of club or organization: Not on file     Attends meetings of clubs or organizations: Not on file     Relationship status: Not on file    Intimate partner violence     Fear of current or ex partner: Not on file     Emotionally abused: Not on file     Physically abused: Not on file     Forced sexual activity: Not on file   Other Topics Concern    Not on file   Social History Narrative    Not on file       Review of Systems  Review of Systems - Review of all systems is negative except as noted above in the HPI.     Vital Signs  Visit Vitals  /67 (BP 1 Location: Left arm, BP Patient Position: Sitting)   Pulse 77   Temp 97.9 °F (36.6 °C) (Oral)   Resp 18   Ht 5' 7\" (1.702 m)   SpO2 97%   BMI 26.16 kg/m²         Physical Exam  Physical Examination: General appearance - alert, well appearing, and in no distress, oriented to person, place, and time and acyanotic, in no respiratory distress  Mental status - alert, oriented to person, place, and time, affect appropriate to mood  Lymphatics - no palpable lymphadenopathy  Chest - clear to auscultation, no wheezes, rales or rhonchi, symmetric air entry  Heart - normal rate, regular rhythm, normal S1, S2, no murmurs, rubs, clicks or gallops  Abdomen - no rebound tenderness noted  bowel sounds normal  Neurological - motor and sensory grossly normal bilaterally  Musculoskeletal - full range of motion without pain  Extremities - no pedal edema noted      Results  Results for orders placed or performed in visit on 09/18/20   LIPID PANEL   Result Value Ref Range Triglyceride 72 40 - 149 mg/dL    HDL Cholesterol 42 >=40 mg/dL    Cholesterol, total 223 (H) 110 - 200 mg/dL    CHOLESTEROL/HDL 5.3 0.0 - 5.0    Non-HDL Cholesterol 181 (H) <130 mg/dL    LDL, calculated 167 (H) 50 - 99 mg/dL    VLDL, calculated 14 8 - 30 mg/dL    LDL/HDL Ratio 4.0    METABOLIC PANEL, COMPREHENSIVE   Result Value Ref Range    Glucose 97 70 - 99 mg/dL    BUN 22 6 - 22 mg/dL    Creatinine 1.0 0.8 - 1.6 mg/dL    Sodium 140 133 - 145 mmol/L    Potassium 4.0 3.5 - 5.5 mmol/L    Chloride 100 98 - 110 mmol/L    CO2 28 20 - 32 mmol/L    AST (SGOT) 17 10 - 37 U/L    ALT (SGPT) 12 5 - 40 U/L    Alk. phosphatase 71 40 - 125 U/L    Bilirubin, total 0.3 0.2 - 1.2 mg/dL    Calcium 9.9 8.4 - 10.5 mg/dL    Protein, total 7.3 6.2 - 8.1 g/dL    Albumin 4.7 3.5 - 5.0 g/dL    A-G Ratio 1.8 1.1 - 2.6 ratio    Globulin 2.6 2.0 - 4.0 g/dL    Anion gap 12.3 3.0 - 15.0 mmol/L    GFRAA >60.0 >60.0    GFRNA >60.0 >60.0   IRON PROFILE   Result Value Ref Range    Iron 66 45 - 160 mcg/dL    UIBC 230 110 - 370 mcg/dL    TIBC 296 228 - 428 mcg/dL    Iron % saturation 22 20 - 50 %   VITAMIN D, 25 HYDROXY   Result Value Ref Range    VITAMIN D, 25-HYDROXY 46.1 32.0 - 100.0 ng/mL   PSA SCREENING (SCREENING)   Result Value Ref Range    Prostate Specific Ag 1.090 <=4.100 ng/mL   CBC WITH AUTOMATED DIFF   Result Value Ref Range    WBC 5.9 4.0 - 11.0 K/uL    RBC 4.88 3.80 - 5.80 M/uL    HGB 14.7 12.6 - 17.1 g/dL    HCT 45.6 37.8 - 52.2 %    MCV 93 80 - 95 fL    MCH 30 26 - 34 pg    MCHC 32 31 - 36 g/dL    RDW 12.8 10.0 - 15.5 %    PLATELET 285 598 - 901 K/uL    MPV 10.6 9.0 - 13.0 fL    NEUTROPHILS 46 40 - 75 %    Lymphocytes 41 20 - 45 %    MONOCYTES 10 3 - 12 %    EOSINOPHILS 2 0 - 6 %    BASOPHILS 1 0 - 2 %    ABS. NEUTROPHILS 2.7 1.8 - 7.7 K/uL    ABSOLUTE LYMPHOCYTE COUNT 2.4 1.0 - 4.8 K/uL    ABS. MONOCYTES 0.6 0.1 - 1.0 K/uL    ABS. EOSINOPHILS 0.1 0.0 - 0.5 K/uL    ABS.  BASOPHILS 0.0 0.0 - 0.2 K/uL   URIC ACID   Result Value Ref Range    Uric acid 9.7 (H) 3.9 - 9.0 mg/dL       ASSESSMENT and PLAN    ICD-10-CM ICD-9-CM    1. Essential hypertension  I10 401.9    2. Dyslipidemia  E78.5 272.4 atorvastatin (LIPITOR) 10 mg tablet   3. Chronic hepatitis C without hepatic coma (HCC)  B18.2 070.54    4. Hyperuricemia  E79.0 790.6    5. Encounter for immunization  Z23 V03.89 PNEUMOCOCCAL POLYSACCHARIDE VACCINE, 23-VALENT, ADULT OR IMMUNOSUPPRESSED PT DOSE,      FLU (FLUAD QUAD INFLUENZA VACCINE,QUAD,ADJUVANTED)      TX IMMUNIZ,ADMIN,EACH ADDL     lab results and schedule of future lab studies reviewed with patient  reviewed diet, exercise and weight control  cardiovascular risk and specific lipid/LDL goals reviewed  reviewed medications and side effects in detail      I have discussed the diagnosis with the patient and the intended plan of care as seen in the above orders. The patient has received an after-visit summary and questions were answered concerning future plans. I have discussed medication, side effects, and warnings with the patient in detail. The patient verbalized understanding and is in agreement with the plan of care. The patient will contact the office with any additional concerns. Romel Kam MD    PLEASE NOTE:   This document has been produced using voice recognition software.  Unrecognized errors in transcription may be present

## 2020-12-30 DIAGNOSIS — I10 ESSENTIAL HYPERTENSION: ICD-10-CM

## 2020-12-30 NOTE — TELEPHONE ENCOUNTER
Received faxed refill request at [facility] office. Last appt 9/29/20, has appt on 1/4/21. Last labs done 9/18/20. Requested Prescriptions     Pending Prescriptions Disp Refills    amLODIPine (NORVASC) 5 mg tablet 180 Tab 0     Sig: Take 2 Tabs by mouth daily.

## 2021-01-04 RX ORDER — AMLODIPINE BESYLATE 5 MG/1
10 TABLET ORAL DAILY
Qty: 180 TAB | Refills: 0 | Status: SHIPPED | OUTPATIENT
Start: 2021-01-04 | End: 2021-03-31

## 2021-01-21 ENCOUNTER — TELEPHONE (OUTPATIENT)
Dept: FAMILY MEDICINE CLINIC | Age: 66
End: 2021-01-21

## 2021-01-21 DIAGNOSIS — E78.5 DYSLIPIDEMIA: ICD-10-CM

## 2021-01-21 DIAGNOSIS — I10 ESSENTIAL HYPERTENSION: Primary | ICD-10-CM

## 2021-01-21 DIAGNOSIS — E79.0 HYPERURICEMIA: ICD-10-CM

## 2021-01-21 NOTE — TELEPHONE ENCOUNTER
Patient would like lab orders placed, so that they can be discussed at upcoming visit 2/16/2021. Patient would like phone call when order is placed, so they can go to  to have labs drawn.

## 2021-02-08 ENCOUNTER — HOSPITAL ENCOUNTER (OUTPATIENT)
Dept: LAB | Age: 66
Discharge: HOME | End: 2021-02-08

## 2021-02-08 LAB
A-G RATIO,AGRAT: 1.5 RATIO (ref 1.1–2.6)
ABSOLUTE LYMPHOCYTE COUNT, 10803: 2.2 K/UL (ref 1–4.8)
ALBUMIN SERPL-MCNC: 4.4 G/DL (ref 3.5–5)
ALP SERPL-CCNC: 92 U/L (ref 40–125)
ALT SERPL-CCNC: 16 U/L (ref 5–40)
ANION GAP SERPL CALC-SCNC: 10.7 MMOL/L (ref 3–15)
AST SERPL W P-5'-P-CCNC: 17 U/L (ref 10–37)
BASOPHILS # BLD: 0 K/UL (ref 0–0.2)
BASOPHILS NFR BLD: 1 % (ref 0–2)
BILIRUB SERPL-MCNC: 0.3 MG/DL (ref 0.2–1.2)
BUN SERPL-MCNC: 12 MG/DL (ref 6–22)
CALCIUM SERPL-MCNC: 10 MG/DL (ref 8.4–10.5)
CHLORIDE SERPL-SCNC: 103 MMOL/L (ref 98–110)
CHOLEST SERPL-MCNC: 186 MG/DL (ref 110–200)
CO2 SERPL-SCNC: 27 MMOL/L (ref 20–32)
CREAT SERPL-MCNC: 1 MG/DL (ref 0.8–1.6)
CREATININE, EXTERNAL: 1
EOSINOPHIL # BLD: 0.1 K/UL (ref 0–0.5)
EOSINOPHIL NFR BLD: 2 % (ref 0–6)
ERYTHROCYTE [DISTWIDTH] IN BLOOD BY AUTOMATED COUNT: 12.3 % (ref 10–15.5)
GFRAA, 66117: >60
GFRNA, 66118: >60
GLOBULIN,GLOB: 2.9 G/DL (ref 2–4)
GLUCOSE SERPL-MCNC: 96 MG/DL (ref 70–99)
GRANULOCYTES,GRANS: 51 % (ref 40–75)
HCT VFR BLD AUTO: 44.1 % (ref 37.8–52.2)
HDLC SERPL-MCNC: 4.2 MG/DL (ref 0–5)
HDLC SERPL-MCNC: 44 MG/DL
HGB BLD-MCNC: 14.5 G/DL (ref 12.6–17.1)
LDL-C, EXTERNAL: 125
LDL/HDL RATIO,LDHD: 2.9
LDLC SERPL CALC-MCNC: 125 MG/DL (ref 50–99)
LYMPHOCYTES, LYMLT: 39 % (ref 20–45)
MCH RBC QN AUTO: 30 PG (ref 26–34)
MCHC RBC AUTO-ENTMCNC: 33 G/DL (ref 31–36)
MCV RBC AUTO: 91 FL (ref 80–95)
MONOCYTES # BLD: 0.5 K/UL (ref 0.1–1)
MONOCYTES NFR BLD: 8 % (ref 3–12)
NEUTROPHILS # BLD AUTO: 2.8 K/UL (ref 1.8–7.7)
NON-HDL CHOLESTEROL, 011976: 142 MG/DL
PLATELET # BLD AUTO: 326 K/UL (ref 140–440)
PMV BLD AUTO: 11 FL (ref 9–13)
POTASSIUM SERPL-SCNC: 4.7 MMOL/L (ref 3.5–5.5)
PROT SERPL-MCNC: 7.3 G/DL (ref 6.2–8.1)
RBC # BLD AUTO: 4.83 M/UL (ref 3.8–5.8)
SENTARA SPECIMEN COL,SENBCF: NORMAL
SODIUM SERPL-SCNC: 141 MMOL/L (ref 133–145)
TRIGL SERPL-MCNC: 88 MG/DL (ref 40–149)
URATE SERPL-MCNC: 7.1 MG/DL (ref 3.9–9)
VLDLC SERPL CALC-MCNC: 18 MG/DL (ref 8–30)
WBC # BLD AUTO: 5.6 K/UL (ref 4–11)

## 2021-02-08 PROCEDURE — 99001 SPECIMEN HANDLING PT-LAB: CPT

## 2021-02-11 RX ORDER — ATORVASTATIN CALCIUM 20 MG/1
20 TABLET, FILM COATED ORAL DAILY
Qty: 30 TAB | Refills: 2 | Status: SHIPPED | OUTPATIENT
Start: 2021-02-11 | End: 2021-06-21 | Stop reason: SDUPTHER

## 2021-02-11 NOTE — PROGRESS NOTES
Please let patient know his LDL cholesterol has come down to 125. We were not decided to come down to below 100. He is on the low-dose Lipitor at 10 mg daily. I would recommend going up to 20 mg daily. I will send in a prescription at the new dose. Recheck labs in 6 months. She will continue to exercise and take a diet low in polysaturated fats. Rest of his lab results are stable.   Romaine Barnett MD

## 2021-02-11 NOTE — PROGRESS NOTES
Spoke with patient.Patient was given lab results. Patient stated that he did not want to go up on his medication. He stated that he doesn't take the medication everyday like he suppose to, and will start now with diet and exercise. Patient didn't have any questions or concerns.

## 2021-02-16 ENCOUNTER — VIRTUAL VISIT (OUTPATIENT)
Dept: FAMILY MEDICINE CLINIC | Age: 66
End: 2021-02-16
Payer: COMMERCIAL

## 2021-02-16 DIAGNOSIS — E78.5 DYSLIPIDEMIA: ICD-10-CM

## 2021-02-16 DIAGNOSIS — Z13.31 SCREENING FOR DEPRESSION: ICD-10-CM

## 2021-02-16 DIAGNOSIS — E79.0 HYPERURICEMIA: ICD-10-CM

## 2021-02-16 DIAGNOSIS — Z00.00 MEDICARE ANNUAL WELLNESS VISIT, SUBSEQUENT: ICD-10-CM

## 2021-02-16 DIAGNOSIS — Z71.89 ADVANCED DIRECTIVES, COUNSELING/DISCUSSION: ICD-10-CM

## 2021-02-16 DIAGNOSIS — B18.2 CHRONIC HEPATITIS C WITHOUT HEPATIC COMA (HCC): ICD-10-CM

## 2021-02-16 DIAGNOSIS — I10 ESSENTIAL HYPERTENSION: Primary | ICD-10-CM

## 2021-02-16 PROCEDURE — G0439 PPPS, SUBSEQ VISIT: HCPCS | Performed by: FAMILY MEDICINE

## 2021-02-16 PROCEDURE — 99497 ADVNCD CARE PLAN 30 MIN: CPT | Performed by: FAMILY MEDICINE

## 2021-02-16 PROCEDURE — 99214 OFFICE O/P EST MOD 30 MIN: CPT | Performed by: FAMILY MEDICINE

## 2021-02-16 PROCEDURE — G0444 DEPRESSION SCREEN ANNUAL: HCPCS | Performed by: FAMILY MEDICINE

## 2021-02-16 NOTE — PATIENT INSTRUCTIONS
Medicare Wellness Visit, Male The best way to live healthy is to have a lifestyle where you eat a well-balanced diet, exercise regularly, limit alcohol use, and quit all forms of tobacco/nicotine, if applicable. Regular preventive services are another way to keep healthy. Preventive services (vaccines, screening tests, monitoring & exams) can help personalize your care plan, which helps you manage your own care. Screening tests can find health problems at the earliest stages, when they are easiest to treat. [facility] follows the current, evidence-based guidelines published by the [facility] Gage Darrick ([facility]) when recommending preventive services for our patients. Because we follow these guidelines, sometimes recommendations change over time as research supports it. (For example, a prostate screening blood test is no longer routinely recommended for men with no symptoms). Of course, you and your doctor may decide to screen more often for some diseases, based on your risk and co-morbidities (chronic disease you are already diagnosed with). Preventive services for you include: - Medicare offers their members a free annual wellness visit, which is time for you and your primary care provider to discuss and plan for your preventive service needs. Take advantage of this benefit every year! 
-All adults over age 72 should receive the recommended pneumonia vaccines. Current USPSTF guidelines recommend a series of two vaccines for the best pneumonia protection.  
-All adults should have a flu vaccine yearly and tetanus vaccine every 10 years. 
-All adults age 48 and older should receive the shingles vaccines (series of two vaccines). -All adults age 38-68 who are overweight should have a diabetes screening test once every three years. -Other screening tests & preventive services for persons with diabetes include: an eye exam to screen for diabetic retinopathy, a kidney function test, a foot exam, and stricter control over your cholesterol.  
-Cardiovascular screening for adults with routine risk involves an electrocardiogram (ECG) at intervals determined by the provider.  
-Colorectal cancer screening should be done for adults age 54-65 with no increased risk factors for colorectal cancer. There are a number of acceptable methods of screening for this type of cancer. Each test has its own benefits and drawbacks. Discuss with your provider what is most appropriate for you during your annual wellness visit. The different tests include: colonoscopy (considered the best screening method), a fecal occult blood test, a fecal DNA test, and sigmoidoscopy. 
-All adults born between [facility] should be screened once for Hepatitis C. 
-An Abdominal Aortic Aneurysm (AAA) Screening is recommended for men age 73-68 who has ever smoked in their lifetime. Here is a list of your current Health Maintenance items (your personalized list of preventive services) with a due date: 
Health Maintenance Due Topic Date Due  
 COVID-19 Vaccine (1 of 2) 05/24/1969  Shingles Vaccine (1 of 2) 05/24/2003  Colorectal Screening  03/12/2020  Glaucoma Screening   03/19/2021 Medicare Wellness Visit, Male The best way to live healthy is to have a lifestyle where you eat a well-balanced diet, exercise regularly, limit alcohol use, and quit all forms of tobacco/nicotine, if applicable. Regular preventive services are another way to keep healthy. Preventive services (vaccines, screening tests, monitoring & exams) can help personalize your care plan, which helps you manage your own care. Screening tests can find health problems at the earliest stages, when they are easiest to treat. [facility] follows the current, evidence-based guidelines published by the [facility] Gage Hollingsworth ([facility]) when recommending preventive services for our patients. Because we follow these guidelines, sometimes recommendations change over time as research supports it. (For example, a prostate screening blood test is no longer routinely recommended for men with no symptoms). Of course, you and your doctor may decide to screen more often for some diseases, based on your risk and co-morbidities (chronic disease you are already diagnosed with). Preventive services for you include: - Medicare offers their members a free annual wellness visit, which is time for you and your primary care provider to discuss and plan for your preventive service needs. Take advantage of this benefit every year! 
-All adults over age 72 should receive the recommended pneumonia vaccines. Current USPSTF guidelines recommend a series of two vaccines for the best pneumonia protection.  
-All adults should have a flu vaccine yearly and tetanus vaccine every 10 years. 
-All adults age 48 and older should receive the shingles vaccines (series of two vaccines). -All adults age 38-68 who are overweight should have a diabetes screening test once every three years.  
-Other screening tests & preventive services for persons with diabetes include: an eye exam to screen for diabetic retinopathy, a kidney function test, a foot exam, and stricter control over your cholesterol.  
-Cardiovascular screening for adults with routine risk involves an electrocardiogram (ECG) at intervals determined by the provider. -Colorectal cancer screening should be done for adults age 54-65 with no increased risk factors for colorectal cancer. There are a number of acceptable methods of screening for this type of cancer. Each test has its own benefits and drawbacks. Discuss with your provider what is most appropriate for you during your annual wellness visit. The different tests include: colonoscopy (considered the best screening method), a fecal occult blood test, a fecal DNA test, and sigmoidoscopy. 
-All adults born between [facility] should be screened once for Hepatitis C. 
-An Abdominal Aortic Aneurysm (AAA) Screening is recommended for men age 73-68 who has ever smoked in their lifetime. Here is a list of your current Health Maintenance items (your personalized list of preventive services) with a due date: 
Health Maintenance Due Topic Date Due  
 COVID-19 Vaccine (1 of 2) 05/24/1969  Shingles Vaccine (1 of 2) 05/24/2003  Colorectal Screening  03/12/2020  Glaucoma Screening   03/19/2021

## 2021-02-16 NOTE — PROGRESS NOTES
Chief Complaint   Patient presents with    Follow Up Chronic Condition     1. Have you been to the ER, urgent care clinic since your last visit? Hospitalized since your last visit? Yes When: 02/21 Where: patient first  Reason for visit: allergic reaction     2. Have you seen or consulted any other health care providers outside of the [address] [place] [address] since your last visit? Include any pap smears or colon screening. No  This is the Subsequent Medicare Annual Wellness Exam, performed 12 months or more after the Initial AWV or the last Subsequent AWV    I have reviewed the patient's medical history in detail and updated the computerized patient record. Depression Risk Factor Screening:     3 most recent PHQ Screens 2/16/2021   Little interest or pleasure in doing things Not at all   Feeling down, depressed, irritable, or hopeless Not at all   Total Score PHQ 2 0   Trouble falling or staying asleep, or sleeping too much Not at all   Feeling tired or having little energy Not at all   Poor appetite, weight loss, or overeating Not at all   Feeling bad about yourself - or that you are a failure or have let yourself or your family down Not at all   Trouble concentrating on things such as school, work, reading, or watching TV Not at all   Moving or speaking so slowly that other people could have noticed; or the opposite being so fidgety that others notice Not at all   Thoughts of being better off dead, or hurting yourself in some way Not at all   PHQ 9 Score 0   How difficult have these problems made it for you to do your work, take care of your home and get along with others Not difficult at all   8 minutes spent on depression screening. Alcohol Risk Screen    Do you average more than 1 drink per night or more than 7 drinks a week: No    In the past three months have you have had more than 4 drinks containing alcohol on one occasion: No        Functional Ability and Level of Safety:     1.  Was the patient's timed Up and GO test unsteady or longer than 30 seconds? No  2. Does the patient need help with the phone, transportation, shopping, preparing meals, housework, laundry, medications or managing money? No  3. Does the patients' home have rugs in the hallway, lack grab bars in the bathroom, lack handrails on the stairs or have poor lighting? No  4. Have you noticed any hearing difficulties? No  Hearing Evaluation:    Hearing: Hearing is good. Activities of Daily Living: The home contains: no safety equipment. Patient does total self care      Ambulation: with no difficulty     Fall Risk:  Fall Risk Assessment, last 12 mths 2/16/2021   Able to walk? Yes   Fall in past 12 months? 0   Do you feel unsteady? 0   Are you worried about falling 0      Abuse Screen:  Patient is not abused       Cognitive Screening    Has your family/caregiver stated any concerns about your memory: no    Cognitive Screening: Normal - MMSE (Mini Mental Status Exam)    Assessment/Plan   Education and counseling provided:  Are appropriate based on today's review and evaluation  Screening for glaucoma    1. Medicare annual wellness visit, subsequent    2.  Advanced directives, counseling/discussion  - FULL CODE    3. Screening for depression  - [address] Maintenance Due     Health Maintenance Due   Topic Date Due    COVID-19 Vaccine (1 of 2) 05/24/1969    Shingrix Vaccine Age 50> (1 of 2) 05/24/2003    Colorectal Cancer Screening Combo  03/12/2020    GLAUCOMA SCREENING Q2Y  03/19/2021       Patient Care Team   Patient Care Team:  Haley Vasquez MD as PCP - General (Family Medicine)  Haley Vasquez MD as PCP - [address] [place] [address] Provider  Aftab Doll MD (Gastroenterology)  Aakash Garcia MD (Cardiology)    History   Bao Pickett was seen for Medicare wellness exam.    Patient Active Problem List   Diagnosis Code    Hyperlipidemia E78.5    HT[facility] (hypertension) [address] [facility] [address] [facility]    ED (erectile dysfunction) N52.9    BPH (benign prostatic hypertrophy) N40.0    Chronic hepatitis C without hepatic coma (HCC) B18.2    Hydrocele, right N43.3     Past Medical History:   Diagnosis Date    BPH (benign prostatic hypertrophy) 10/15/2012    ED (erectile dysfunction) 9/30/2011    Hepatitis C     HTN (hypertension) 9/30/2011    Hyperlipidemia 9/30/2011      Past Surgical History:   Procedure Laterality Date    HX ACL RECONSTRUCTION      HX COLONOSCOPY       Current Outpatient Medications   Medication Sig Dispense Refill    atorvastatin (LIPITOR) 20 mg tablet Take 1 Tab by mouth daily. 30 Tab 2    amLODIPine (NORVASC) 5 mg tablet Take 2 Tabs by mouth daily. 180 Tab 0    colchicine (MITIGARE) 0.6 mg capsule Take 0.6 mg  daily 90 Cap 0    hydroCHLOROthiazide (HYDRODIURIL) 25 mg tablet take 1 tablet by mouth once daily 90 Tab 1    lisinopriL (PRINIVIL, ZESTRIL) 40 mg tablet take 1 tablet by mouth daily 90 Tab 3    permethrin (ACTICIN) 5 % topical cream apply sparingly as directed, Apply to entire body; leave on for 8 to 14 hours before washing off with water 120 g 2    ivermectin (STROMECTOL) 3 mg tablet Take 15 mg ( 5 tabs) on days 0,1,7,8,14,21,28 35 Tab 0    ivermectin 1 % crea Apply to affected area thin layer every 2-3 days 45 g 1    triamcinolone acetonide (KENALOG) 0.1 % topical cream Apply  to affected area two (2) times a day. use thin layer 454 g 0    tadalafil (CIALIS) 10 mg tablet Take 1 Tab by mouth daily as needed. 4 Tab 3    urea (CARMOL) 40 % topical cream Apply  to affected area two (2) times a day. 85 g 0    aspirin 81 mg tablet Take 81 mg by mouth daily.  MULTIVITAMIN PO Take  by mouth daily.        Allergies   Allergen Reactions    Sean [Amlodipine-Olmesartan] Palpitations and Other (comments)     Fatigue, headache, pt states \"kidneys hurt\"    Atenolol Rash    Miralax [Polyethylene Glycol 3350] Swelling       Family History   Problem Relation Age of Onset    Hypertension Father     Alcohol abuse Father     Hypertension Mother     Hypertension Brother     Hypertension Brother      Social History     Tobacco Use    Smoking status: Former Smoker    Smokeless tobacco: Never Used   Substance Use Topics    Alcohol use: Yes     Alcohol/week: 1.0 standard drinks     Types: 1 Cans of beer per week       Erendira Goddard, who was evaluated through a synchronous (real-time) audio-video encounter, and/or his healthcare decision maker, is aware that it is a billable service, with coverage as determined by his insurance carrier. He provided verbal consent to proceed: Yes, and patient identification was verified. It was conducted pursuant to the emergency declaration under the [address] [place] [address], [address] [place] [address] authority and the [employer] and [employer] General Act. A caregiver was present when appropriate. Ability to conduct physical exam was limited. I was in the office. The patient was at home.     Tiffanie Mejia LPN

## 2021-02-17 NOTE — PROGRESS NOTES
Bharati Morris is a 77 y.o. male who was seen by synchronous (real-time) audio-video technology on 2/16/2021 for Follow Up Chronic Condition        Assessment & Plan:       ICD-10-CM ICD-9-CM    1. Essential hypertension  I10 401.9    2. Dyslipidemia  E78.5 272.4    3. Chronic hepatitis C without hepatic coma (HCC)  B18.2 070.54    4. Hyperuricemia  E79.0 790.6    5. Medicare annual wellness visit, subsequent  Z00.00 V70.0    6. Advanced directives, counseling/discussion  Z71.89 V65.49 FULL CODE   7. Screening for depression  Z13.31 V79.0 DEPRESSION SCREEN ANNUAL      SD DEPRESSION SCREEN ANNUAL     Subjective:   Bharati Morris is seen for follow-up care. Dyslipidemia: Patient has dyslipidemia. He takes Lipitor 10 mg. Has been taking this twice a week. Lipid panel was noted to be elevated. Discussed going up on the Lipitor to 20 mg daily. States that because he was not taking this daily he will try and take the 10 mg Lipitor more frequently. He should try and take it daily. He should also exercise and take a diet low in polysaturated fats. We will recheck lipid panel at next visit. Hypertension: Patient has hypertension. He takes amlodipine, HCTZ and lisinopril. Has been stable on these medications. He denies headache, changes in vision or focal weakness. We will continue with the current management plan. Hepatitis C: Patient has history of chronic hepatitis C. He was seen by the gastroenterologist and had treatment for this. He took [address]. States that he completed the treatment and has been cleared by the gastroenterologist.  He denies jaundice or abdominal pain. Gout arthritis: Patient has a history of gout arthritis. He takes colchicine. Stable on the medication. He should take a purine restricted diet. Patient had questions about the COVID-19 vaccine and whether he should get it. I did advise that he should get the vaccine.   We discussed side effects and the information out in the media about the vaccine. Health maintenance: Patient has had colonoscopy done. We will request the records. Initially he had problems with the first bowel prep and he had severe reaction but subsequently was given a different prep and tolerated it. Patient is scheduled to have glaucoma screening. He had PSA screening done in September. This was within normal.    Prior to Admission medications    Medication Sig Start Date End Date Taking? Authorizing Provider   atorvastatin (LIPITOR) 20 mg tablet Take 1 Tab by mouth daily. 2/11/21  Yes Luther Vazquez MD   amLODIPine (NORVASC) 5 mg tablet Take 2 Tabs by mouth daily. 1/4/21  Yes Brie Mason MD   colchicine (MITIGARE) 0.6 mg capsule Take 0.6 mg  daily 7/21/20  Yes Luther Olmstead MD   hydroCHLOROthiazide (HYDRODIURIL) 25 mg tablet take 1 tablet by mouth once daily 7/6/20  Yes Brie Mason MD   lisinopriL (PRINIVIL, ZESTRIL) 40 mg tablet take 1 tablet by mouth daily 4/15/20  Yes Brie Mason MD   permethrin (ACTICIN) 5 % topical cream apply sparingly as directed, Apply to entire body; leave on for 8 to 14 hours before washing off with water 1/16/20  Yes Brie Mason MD   ivermectin (STROMECTOL) 3 mg tablet Take 15 mg ( 5 tabs) on days 0,1,7,8,14,21,28 12/19/19  Yes Luther Olmstead MD   ivermectin 1 % crea Apply to affected area thin layer every 2-3 days 12/19/19  Yes Brie Mason MD   triamcinolone acetonide (KENALOG) 0.1 % topical cream Apply  to affected area two (2) times a day. use thin layer 9/17/19  Yes Luther Vazquez MD   tadalafil (CIALIS) 10 mg tablet Take 1 Tab by mouth daily as needed. 6/14/18  Yes Tej Grover,    urea (CARMOL) 40 % topical cream Apply  to affected area two (2) times a day. 11/3/17  Yes Sharad Kearney MD   aspirin 81 mg tablet Take 81 mg by mouth daily. Yes Provider, Historical   MULTIVITAMIN PO Take  by mouth daily.    Yes Provider, Historical     ROS Review of all systems is negative except as noted above in the HPI. Objective:   No flowsheet data found. Constitutional: [x] Appears well-developed and well-nourished [x] No apparent distress     Mental status: [x] Alert and awake  [x] Oriented to person/place/time [x] Able to follow commands       HENT: [x] Normocephalic, atraumatic   [x] Mouth/Throat: Mucous membranes are moist    Pulmonary/Chest: [x] Respiratory effort normal   [x] No visualized signs of difficulty breathing or respiratory distress            Neurological:        [x] No Facial Asymmetry (Cranial nerve 7 motor function) (limited exam due to video visit)                     Psychiatric:       [x] Normal Affect    We discussed the expected course, resolution and complications of the diagnosis(es) in detail. Medication risks, benefits, costs, interactions, and alternatives were discussed as indicated. I advised him to contact the office if his condition worsens, changes or fails to improve as anticipated. He expressed understanding with the diagnosis(es) and plan. Dixie Pablo, who was evaluated through a patient-initiated, synchronous (real-time) audio-video encounter, and/or his healthcare decision maker, is aware that it is a billable service, with coverage as determined by his insurance carrier. He provided verbal consent to proceed: Yes, and patient identification was verified. It was conducted pursuant to the emergency declaration under the [address] [place] [address], [address] [place] [address] authority and the [email or web address] Act. A caregiver was present when appropriate. Ability to conduct physical exam was limited. I was in the office. The patient was at home.       Dhara Hannah MD

## 2021-02-19 NOTE — ACP (ADVANCE CARE PLANNING)
Advance Care Planning     Advance Care Planning (ACP) Physician/NP/PA Conversation      Date of Conversation: 2/16/2021  Conducted with: Patient with Decision Making Capacity    Healthcare Decision Maker:     Primary Decision Maker (Active): Anna Silverio - [phone]  Click here to complete [address] including selection of the Healthcare Decision Maker Relationship (ie \"Primary\")  Today we documented Decision Maker(s). The patient will provide ACP documents. Care Preferences:    Hospitalization: \"If your health worsens and it becomes clear that your chance of recovery is unlikely, what would be your preference regarding hospitalization? \"  The patient would prefer hospitalization. Ventilation: \"If you were unable to breathe on your own and your chance of recovery was unlikely, what would be your preference about the use of a ventilator (breathing machine) if it was available to you? \"   The patient would desire the use of a ventilator. Resuscitation: \"In the event your heart stopped as a result of an underlying serious health condition, would you want attempts to be made to restart your heart, or would you prefer a natural death? \"   Yes, attempt to resuscitate.     Additional topics discussed: treatment goals, ventilation preferences, hospitalization preferences and resuscitation preferences    Conversation Outcomes / Follow-Up Plan:   ACP in process - completing/providing documents   Reviewed DNR/DNI and patient elects Full Code (Attempt Resuscitation)     Length of Voluntary ACP Conversation in minutes:  16 minutes    Luther Warren MD

## 2021-02-23 DIAGNOSIS — Z23 ENCOUNTER FOR IMMUNIZATION: Primary | ICD-10-CM

## 2021-04-07 DIAGNOSIS — I10 ESSENTIAL HYPERTENSION: ICD-10-CM

## 2021-04-07 DIAGNOSIS — E78.5 DYSLIPIDEMIA: ICD-10-CM

## 2021-04-07 DIAGNOSIS — E79.0 HYPERURICEMIA: ICD-10-CM

## 2021-06-07 DIAGNOSIS — I10 ESSENTIAL HYPERTENSION: ICD-10-CM

## 2021-06-07 NOTE — TELEPHONE ENCOUNTER
Requested Prescriptions     Pending Prescriptions Disp Refills    lisinopriL (PRINIVIL, ZESTRIL) 40 mg tablet 90 Tablet 3     Sig: take 1 tablet by mouth daily     Patient requesting the following medication refill       Date last prescribed: 04/15/2021    Date patient last seen: 02/16/2021    Follow up appointment scheduled: 02/15/2021    Please Advise

## 2021-06-07 NOTE — TELEPHONE ENCOUNTER
Requested Prescriptions     Pending Prescriptions Disp Refills    lisinopriL (PRINIVIL, ZESTRIL) 40 mg tablet 90 Tablet 3     Sig: take 1 tablet by mouth daily   pt stated he is completely out of this medication.  Please be advised

## 2021-06-08 RX ORDER — LISINOPRIL 40 MG/1
TABLET ORAL
Qty: 90 TABLET | Refills: 3 | Status: SHIPPED | OUTPATIENT
Start: 2021-06-08 | End: 2021-12-29

## 2021-06-21 NOTE — TELEPHONE ENCOUNTER
Requested Prescriptions     Pending Prescriptions Disp Refills    atorvastatin (LIPITOR) 20 mg tablet 30 Tablet 2     Sig: Take 1 Tablet by mouth daily. Maryellen Moore called for their medication refill.     Last Office visit:  09-  Last labs:  02-  Follow up visit:  No follow up scheduled  Last date prescribe 02-    Please Advise

## 2021-06-21 NOTE — TELEPHONE ENCOUNTER
Requested Prescriptions     Pending Prescriptions Disp Refills    atorvastatin (LIPITOR) 20 mg tablet 30 Tablet 2     Sig: Take 1 Tablet by mouth daily.

## 2021-06-22 RX ORDER — ATORVASTATIN CALCIUM 20 MG/1
20 TABLET, FILM COATED ORAL DAILY
Qty: 30 TABLET | Refills: 2 | Status: SHIPPED | OUTPATIENT
Start: 2021-06-22 | End: 2021-06-28 | Stop reason: DRUGHIGH

## 2021-06-28 ENCOUNTER — TELEPHONE (OUTPATIENT)
Dept: FAMILY MEDICINE CLINIC | Age: 66
End: 2021-06-28

## 2021-06-28 RX ORDER — ATORVASTATIN CALCIUM 10 MG/1
10 TABLET, FILM COATED ORAL DAILY
Qty: 90 TABLET | Refills: 1 | Status: SHIPPED | OUTPATIENT
Start: 2021-06-28 | End: 2022-05-23 | Stop reason: DRUGHIGH

## 2021-06-28 NOTE — TELEPHONE ENCOUNTER
Patient called stating Lipitor was sent in for 20MG. He wants to take 10MG. He took the 20MG back to the pharmacy.

## 2021-07-06 DIAGNOSIS — I10 ESSENTIAL HYPERTENSION: ICD-10-CM

## 2021-07-07 RX ORDER — HYDROCHLOROTHIAZIDE 25 MG/1
TABLET ORAL
Qty: 90 TABLET | Refills: 1 | Status: SHIPPED | OUTPATIENT
Start: 2021-07-07 | End: 2022-01-10

## 2021-07-07 RX ORDER — AMLODIPINE BESYLATE 5 MG/1
TABLET ORAL
Qty: 180 TABLET | Refills: 1 | Status: SHIPPED | OUTPATIENT
Start: 2021-07-07 | End: 2022-01-10

## 2021-08-18 ENCOUNTER — TELEPHONE (OUTPATIENT)
Dept: FAMILY MEDICINE CLINIC | Age: 66
End: 2021-08-18

## 2021-08-18 NOTE — TELEPHONE ENCOUNTER
Pt is requesting Dr Jasmine Laws to place lab orders to do a full work up for his upcoming lab jaquelin he scheduled.  Please contact for any questions or concerns of this matter when available

## 2021-08-20 DIAGNOSIS — E78.5 DYSLIPIDEMIA: ICD-10-CM

## 2021-08-20 DIAGNOSIS — R73.9 HYPERGLYCEMIA: ICD-10-CM

## 2021-08-20 DIAGNOSIS — E61.1 IRON DEFICIENCY: ICD-10-CM

## 2021-08-20 DIAGNOSIS — I10 ESSENTIAL HYPERTENSION: Primary | ICD-10-CM

## 2021-08-20 DIAGNOSIS — E55.9 HYPOVITAMINOSIS D: ICD-10-CM

## 2021-08-20 DIAGNOSIS — Z12.5 SCREENING FOR MALIGNANT NEOPLASM OF PROSTATE: ICD-10-CM

## 2021-08-20 DIAGNOSIS — E79.0 HYPERURICEMIA: ICD-10-CM

## 2021-08-20 NOTE — TELEPHONE ENCOUNTER
Spoke with patient. Patient was informed that labs was added. Patient didn't have any more questions or concerns.

## 2021-09-08 ENCOUNTER — LAB ONLY (OUTPATIENT)
Dept: FAMILY MEDICINE CLINIC | Age: 66
End: 2021-09-08
Payer: COMMERCIAL

## 2021-09-08 DIAGNOSIS — E55.9 HYPOVITAMINOSIS D: ICD-10-CM

## 2021-09-08 DIAGNOSIS — Z01.89 ENCOUNTER FOR LABORATORY TEST: Primary | ICD-10-CM

## 2021-09-08 DIAGNOSIS — R73.9 HYPERGLYCEMIA: ICD-10-CM

## 2021-09-08 DIAGNOSIS — Z12.5 SCREENING FOR MALIGNANT NEOPLASM OF PROSTATE: ICD-10-CM

## 2021-09-08 DIAGNOSIS — E61.1 IRON DEFICIENCY: ICD-10-CM

## 2021-09-08 DIAGNOSIS — I10 ESSENTIAL HYPERTENSION: ICD-10-CM

## 2021-09-08 DIAGNOSIS — E79.0 HYPERURICEMIA: ICD-10-CM

## 2021-09-08 DIAGNOSIS — E78.5 DYSLIPIDEMIA: ICD-10-CM

## 2021-09-08 PROCEDURE — 36415 COLL VENOUS BLD VENIPUNCTURE: CPT | Performed by: FAMILY MEDICINE

## 2021-09-09 LAB
25(OH)D3 SERPL-MCNC: 49.9 NG/ML (ref 32–100)
A-G RATIO,AGRAT: 1.6 RATIO (ref 1.1–2.6)
ABSOLUTE LYMPHOCYTE COUNT, 10803: 2.4 K/UL (ref 1–4.8)
ALBUMIN SERPL-MCNC: 4.6 G/DL (ref 3.5–5)
ALP SERPL-CCNC: 87 U/L (ref 40–125)
ALT SERPL-CCNC: 16 U/L (ref 5–40)
ANION GAP SERPL CALC-SCNC: 10 MMOL/L (ref 3–15)
AST SERPL W P-5'-P-CCNC: 21 U/L (ref 10–37)
AVG GLU, 10930: 129 MG/DL (ref 91–123)
BASOPHILS # BLD: 0 K/UL (ref 0–0.2)
BASOPHILS NFR BLD: 1 % (ref 0–2)
BILIRUB SERPL-MCNC: 0.2 MG/DL (ref 0.2–1.2)
BUN SERPL-MCNC: 15 MG/DL (ref 6–22)
CALCIUM SERPL-MCNC: 9.7 MG/DL (ref 8.4–10.5)
CHLORIDE SERPL-SCNC: 103 MMOL/L (ref 98–110)
CHOLEST SERPL-MCNC: 170 MG/DL (ref 110–200)
CO2 SERPL-SCNC: 29 MMOL/L (ref 20–32)
CREAT SERPL-MCNC: 1 MG/DL (ref 0.8–1.6)
EOSINOPHIL # BLD: 0.1 K/UL (ref 0–0.5)
EOSINOPHIL NFR BLD: 2 % (ref 0–6)
ERYTHROCYTE [DISTWIDTH] IN BLOOD BY AUTOMATED COUNT: 13.6 % (ref 10–15.5)
FE % SATURATION,PSAT: 29 % (ref 20–50)
FERRITIN SERPL-MCNC: 246 NG/ML (ref 22–322)
GFRAA, 66117: >60
GFRNA, 66118: >60
GLOBULIN,GLOB: 2.8 G/DL (ref 2–4)
GLUCOSE SERPL-MCNC: 112 MG/DL (ref 70–99)
GRANULOCYTES,GRANS: 48 % (ref 40–75)
HBA1C MFR BLD HPLC: 6.1 % (ref 4.8–5.6)
HCT VFR BLD AUTO: 48.8 % (ref 37.8–52.2)
HDLC SERPL-MCNC: 39 MG/DL
HDLC SERPL-MCNC: 4.4 MG/DL (ref 0–5)
HGB BLD-MCNC: 14.9 G/DL (ref 12.6–17.1)
IRON,IRN: 81 MCG/DL (ref 45–160)
LDL/HDL RATIO,LDHD: 2.8
LDLC SERPL CALC-MCNC: 110 MG/DL (ref 50–99)
LYMPHOCYTES, LYMLT: 42 % (ref 20–45)
MCH RBC QN AUTO: 31 PG (ref 26–34)
MCHC RBC AUTO-ENTMCNC: 31 G/DL (ref 31–36)
MCV RBC AUTO: 101 FL (ref 80–95)
MONOCYTES # BLD: 0.4 K/UL (ref 0.1–1)
MONOCYTES NFR BLD: 7 % (ref 3–12)
NEUTROPHILS # BLD AUTO: 2.7 K/UL (ref 1.8–7.7)
NON-HDL CHOLESTEROL, 011976: 131 MG/DL
PLATELET # BLD AUTO: 295 K/UL (ref 140–440)
PMV BLD AUTO: 11.2 FL (ref 9–13)
POTASSIUM SERPL-SCNC: 4.8 MMOL/L (ref 3.5–5.5)
PROT SERPL-MCNC: 7.4 G/DL (ref 6.2–8.1)
PSA SERPL-MCNC: 1.07 NG/ML
RBC # BLD AUTO: 4.85 M/UL (ref 3.8–5.8)
SODIUM SERPL-SCNC: 142 MMOL/L (ref 133–145)
TIBC,TIBC: 278 MCG/DL (ref 228–428)
TRIGL SERPL-MCNC: 107 MG/DL (ref 40–149)
UIBC SERPL-MCNC: 197 MCG/DL (ref 110–370)
URATE SERPL-MCNC: 8 MG/DL (ref 3.9–9)
VLDLC SERPL CALC-MCNC: 21 MG/DL (ref 8–30)
WBC # BLD AUTO: 5.7 K/UL (ref 4–11)

## 2021-09-10 NOTE — PROGRESS NOTES
Please let patient know his LDL cholesterol is down to 110 from the previous 125. We would want this to go down to below 100. He is on Lipitor 10 mg daily. He should exercise and take a diet low in polysaturated fats. Recheck labs in 6 months. If still above 100 and would consider going up on the Lipitor to 20 mg daily. Rest of his labs are reassuring.   Debra Turner MD

## 2021-09-10 NOTE — PROGRESS NOTES
Please let patient know his HbA1c is 6.1. This is still in the prediabetes range. He should intensify lifestyle and dietary modification.   Kelly Baker MD

## 2021-10-04 ENCOUNTER — OFFICE VISIT (OUTPATIENT)
Dept: FAMILY MEDICINE CLINIC | Age: 66
End: 2021-10-04
Payer: COMMERCIAL

## 2021-10-04 VITALS
BODY MASS INDEX: 25.58 KG/M2 | OXYGEN SATURATION: 98 % | WEIGHT: 163 LBS | HEIGHT: 67 IN | RESPIRATION RATE: 20 BRPM | HEART RATE: 73 BPM | SYSTOLIC BLOOD PRESSURE: 137 MMHG | DIASTOLIC BLOOD PRESSURE: 73 MMHG | TEMPERATURE: 96.1 F

## 2021-10-04 DIAGNOSIS — T78.40XA ALLERGY, INITIAL ENCOUNTER: ICD-10-CM

## 2021-10-04 DIAGNOSIS — E78.5 DYSLIPIDEMIA: ICD-10-CM

## 2021-10-04 DIAGNOSIS — I10 ESSENTIAL HYPERTENSION: Primary | ICD-10-CM

## 2021-10-04 DIAGNOSIS — Z23 ENCOUNTER FOR IMMUNIZATION: ICD-10-CM

## 2021-10-04 DIAGNOSIS — E79.0 HYPERURICEMIA: ICD-10-CM

## 2021-10-04 DIAGNOSIS — R73.03 PREDIABETES: ICD-10-CM

## 2021-10-04 PROCEDURE — 90471 IMMUNIZATION ADMIN: CPT | Performed by: FAMILY MEDICINE

## 2021-10-04 PROCEDURE — 90694 VACC AIIV4 NO PRSRV 0.5ML IM: CPT | Performed by: FAMILY MEDICINE

## 2021-10-04 PROCEDURE — 99214 OFFICE O/P EST MOD 30 MIN: CPT | Performed by: FAMILY MEDICINE

## 2021-10-04 NOTE — PROGRESS NOTES
1. Have you been to the ER, urgent care clinic since your last visit? Hospitalized since your last visit? Yes When: Urgent care about 1 month ago for fall    2. Have you seen or consulted any other health care providers outside of the [address] [place] [address] since your last visit? Include any pap smears or colon screening. No     HPI  Bharati Morris comes in for f/u care. HTN: Patient has hypertension. Blood pressure is stable. He denies headache, changes in vision or focal weakness. He is on amlodipine, lisinopril and HCTZ. We will continue current treatment plan. Lab work is stable. Dyslipidemia: Patient has dyslipidemia. LDL cholesterol is 110. This is down from 125. He is on atorvastatin. He will exercise and take a diet low in polysaturated fats. Gout arthritis: Patient has gout arthritis. He is on colchicine. Denies any acute gout arthropathy at the moment. Prediabetes: Patient has prediabetes. HbA1c 6.1. He will intensify lifestyle and dietary modification. Allergy: Patient has allergy to polyethylene glycol. We will try to order COVID-19 vaccine for him but this was indicated as an allergy for him. It is thus not possible point to get her COVID-19 vaccine due to allergy. He was given a letter stating this. Health maintenance: Patient will get the flu vaccine today.       Past Medical History  Past Medical History:   Diagnosis Date    BPH (benign prostatic hypertrophy) 10/15/2012    ED (erectile dysfunction) 9/30/2011    Hepatitis C     HTN (hypertension) 9/30/2011    Hyperlipidemia 9/30/2011       Surgical History  Past Surgical History:   Procedure Laterality Date    HX ACL RECONSTRUCTION      HX COLONOSCOPY          Medications  Current Outpatient Medications   Medication Sig Dispense Refill    amLODIPine (NORVASC) 5 mg tablet take 2 tablets by mouth once daily 180 Tablet 1    hydroCHLOROthiazide (HYDRODIURIL) 25 mg tablet take 1 tablet by mouth once daily 90 Tablet 1  atorvastatin (LIPITOR) 10 mg tablet Take 1 Tablet by mouth daily. 90 Tablet 1    lisinopriL (PRINIVIL, ZESTRIL) 40 mg tablet take 1 tablet by mouth daily 90 Tablet 3    colchicine (MITIGARE) 0.6 mg capsule Take 0.6 mg  daily 90 Cap 0    permethrin (ACTICIN) 5 % topical cream apply sparingly as directed, Apply to entire body; leave on for 8 to 14 hours before washing off with water 120 g 2    triamcinolone acetonide (KENALOG) 0.1 % topical cream Apply  to affected area two (2) times a day. use thin layer 454 g 0    tadalafil (CIALIS) 10 mg tablet Take 1 Tab by mouth daily as needed. 4 Tab 3    urea (CARMOL) 40 % topical cream Apply  to affected area two (2) times a day. 85 g 0    aspirin 81 mg tablet Take 81 mg by mouth daily.  MULTIVITAMIN PO Take  by mouth daily.  ivermectin (STROMECTOL) 3 mg tablet Take 15 mg ( 5 tabs) on days 0,1,7,8,14,21,28 (Patient not taking: Reported on 10/4/2021) 35 Tab 0    ivermectin 1 % crea Apply to affected area thin layer every 2-3 days (Patient not taking: Reported on 10/4/2021) 45 g 1       Allergies  Allergies   Allergen Reactions    Sean [Amlodipine-Olmesartan] Palpitations and Other (comments)     Fatigue, headache, pt states \"kidneys hurt\"    Atenolol Rash    Miralax [Polyethylene Glycol 3350] Swelling       Family History  Family History   Problem Relation Age of Onset    Hypertension Father     Alcohol abuse Father     Hypertension Mother     Hypertension Brother     Hypertension Brother        Social History  Social History     Socioeconomic History    Marital status:      Spouse name: Not on file    Number of children: Not on file    Years of education: Not on file    Highest education level: Not on file   Occupational History    Not on file   Tobacco Use    Smoking status: Former Smoker    Smokeless tobacco: Never Used   Substance and Sexual Activity    Alcohol use:  Yes     Alcohol/week: 1.0 standard drinks     Types: 1 Cans of beer per week    Drug use: No    Sexual activity: Yes   Other Topics Concern    Not on file   Social History Narrative    Not on file     Social Determinants of Health     Financial Resource Strain:     Difficulty of Paying Living Expenses:    Food Insecurity:     Worried About Running Out of Food in the Last Year:     [address] in the Last Year:    Transportation Needs:     Lack of Transportation (Medical):  Lack of Transportation (Non-Medical):    Physical Activity:     Days of Exercise per Week:     Minutes of Exercise per Session:    Stress:     Feeling of Stress :    Social Connections:     Frequency of Communication with Friends and Family:     Frequency of Social Gatherings with Friends and Family:     Attends [employer] Services:     Active Member of Clubs or Organizations:     Attends Club or Organization Meetings:     Marital Status:    Intimate Partner Violence:     Fear of Current or Ex-Partner:     Emotionally Abused:     Physically Abused:     Sexually Abused:        Review of Systems  Review of Systems - Review of all systems is negative except as noted above in the HPI.     Vital Signs  Visit Vitals  /73 (BP 1 Location: Right upper arm, BP Patient Position: Sitting, BP Cuff Size: Adult long)   Pulse 73   Temp (!) 96.1 °F (35.6 °C) (Oral)   Resp 20   Ht 5' 7\" (1.702 m)   Wt 163 lb (73.9 kg)   SpO2 98%   BMI 25.53 kg/m²         Physical Exam  Physical Examination: General appearance - alert, well appearing, and in no distress, oriented to person, place, and time and acyanotic, in no respiratory distress  Mental status - alert, oriented to person, place, and time, affect appropriate to mood  Neck - supple, no significant adenopathy  Lymphatics - no palpable lymphadenopathy, no hepatosplenomegaly  Chest - no tachypnea, retractions or cyanosis  Heart - S1 and S2 normal  Abdomen - no rebound tenderness noted  Back exam - limited range of motion  Neurological - abnormal neurological exam unchanged from prior examinations  Musculoskeletal - osteoarthritic changes noted in both hands  Extremities - intact peripheral pulses      Results  Results for orders placed or performed in visit on 09/08/21   HEMOGLOBIN A1C W/O EAG   Result Value Ref Range    Hemoglobin A1c 6.1 (H) 4.8 - 5.6 %    AVG  (H) 91 - 123 mg/dL       ASSESSMENT and PLAN    ICD-10-CM ICD-9-CM    1. Essential hypertension  I10 401.9    2. Dyslipidemia  E78.5 272.4    3. Hyperuricemia  E79.0 790.6    4. Prediabetes  R73.03 790.29    5. Allergy, initial encounter  T78.40XA 995.3    6. Encounter for immunization  Z23 V03.89 FLU (FLUAD QUAD INFLUENZA VACCINE,QUAD,ADJUVANTED)     lab results and schedule of future lab studies reviewed with patient  reviewed diet, exercise and weight control  cardiovascular risk and specific lipid/LDL goals reviewed  reviewed medications and side effects in detail  use of aspirin to prevent MI and TIA's discussed      I have discussed the diagnosis with the patient and the intended plan of care as seen in the above orders. The patient has received an after-visit summary and questions were answered concerning future plans. I have discussed medication, side effects, and warnings with the patient in detail. The patient verbalized understanding and is in agreement with the plan of care. The patient will contact the office with any additional concerns. Donnell Hopper MD    PLEASE NOTE:   This document has been produced using voice recognition software.  Unrecognized errors in transcription may be present

## 2021-10-04 NOTE — PROGRESS NOTES
After obtaining consent, and per orders of Dr. Nilson Medina, injection of Fluad Quad Adjuvanted given by Mike Roque. Patient instructed to remain in clinic for 20 minutes afterwards, and to report any adverse reaction to me immediately.

## 2021-10-04 NOTE — LETTER
NOTIFICATION TO WORK     10/4/2021 11:51 AM    Mr. Donna Caro  [address] [facility]      To Whom It May Concern:    Donna Caro is currently under the care of [address] [place] [address]. He has an allergy to a component that is in the Covid 19 vaccine. As a result he should be exempted from taking the vaccine on medical grounds due to allergy. If there are questions or concerns please have the patient contact our office.         Sincerely,      Donnell Hopper MD

## 2021-12-28 DIAGNOSIS — I10 ESSENTIAL HYPERTENSION: ICD-10-CM

## 2021-12-29 RX ORDER — LISINOPRIL 40 MG/1
TABLET ORAL
Qty: 90 TABLET | Refills: 3 | Status: SHIPPED | OUTPATIENT
Start: 2021-12-29

## 2022-01-08 DIAGNOSIS — I10 ESSENTIAL HYPERTENSION: ICD-10-CM

## 2022-01-10 RX ORDER — HYDROCHLOROTHIAZIDE 25 MG/1
TABLET ORAL
Qty: 90 TABLET | Refills: 1 | Status: SHIPPED | OUTPATIENT
Start: 2022-01-10 | End: 2022-10-04 | Stop reason: ALTCHOICE

## 2022-01-10 RX ORDER — AMLODIPINE BESYLATE 5 MG/1
TABLET ORAL
Qty: 180 TABLET | Refills: 1 | Status: SHIPPED | OUTPATIENT
Start: 2022-01-10 | End: 2022-09-21

## 2022-01-25 DIAGNOSIS — M10.9 ACUTE GOUT, UNSPECIFIED CAUSE, UNSPECIFIED SITE: ICD-10-CM

## 2022-01-26 RX ORDER — COLCHICINE 0.6 MG/1
CAPSULE ORAL
Qty: 90 CAPSULE | Refills: 0 | Status: SHIPPED | OUTPATIENT
Start: 2022-01-26 | End: 2022-01-28

## 2022-01-28 ENCOUNTER — TELEPHONE (OUTPATIENT)
Dept: FAMILY MEDICINE CLINIC | Age: 67
End: 2022-01-28

## 2022-01-28 DIAGNOSIS — M10.9 ACUTE GOUT, UNSPECIFIED CAUSE, UNSPECIFIED SITE: Primary | ICD-10-CM

## 2022-01-28 RX ORDER — COLCHICINE 0.6 MG/1
0.6 TABLET ORAL DAILY
Qty: 30 TABLET | Refills: 1 | Status: SHIPPED | OUTPATIENT
Start: 2022-01-28 | End: 2022-02-27

## 2022-01-28 NOTE — TELEPHONE ENCOUNTER
Patient states that he is in the middle of a gout attack and is having trouble filling his colchicine. His [facility] pharmacy states that he needs a prior authorization for this medication. Called the [facility] pharmacy and they told me that capsules are not covered however the tablets are covered so new script was sent in for the tablets.

## 2022-01-28 NOTE — TELEPHONE ENCOUNTER
Pt wife stated her  prescription was approved but it needs a prior authorization. Pt wife stated her  is in the middle of a gout attack. Please advise.  Thank you!!!

## 2022-01-31 NOTE — TELEPHONE ENCOUNTER
Pre Auth was sent and received and completed    ----- Message from Henretta Holter, MD sent at 1/28/2022  2:33 PM EST -----  You mind calling [facility] ( Patient's pharmacy) to see why patient was unable to get his colchicine prescription.   Thank you so much

## 2022-04-27 ENCOUNTER — VIRTUAL VISIT (OUTPATIENT)
Dept: FAMILY MEDICINE CLINIC | Age: 67
End: 2022-04-27
Payer: COMMERCIAL

## 2022-04-27 DIAGNOSIS — E79.0 HYPERURICEMIA: ICD-10-CM

## 2022-04-27 DIAGNOSIS — I10 ESSENTIAL HYPERTENSION: Primary | ICD-10-CM

## 2022-04-27 DIAGNOSIS — E78.5 DYSLIPIDEMIA: ICD-10-CM

## 2022-04-27 DIAGNOSIS — R73.03 PREDIABETES: ICD-10-CM

## 2022-04-27 DIAGNOSIS — Z12.5 SCREENING FOR MALIGNANT NEOPLASM OF PROSTATE: ICD-10-CM

## 2022-04-27 DIAGNOSIS — U07.1 COVID-19: ICD-10-CM

## 2022-04-27 DIAGNOSIS — E55.9 HYPOVITAMINOSIS D: ICD-10-CM

## 2022-04-27 DIAGNOSIS — R73.9 HYPERGLYCEMIA: ICD-10-CM

## 2022-04-27 DIAGNOSIS — E07.9 THYROID DISORDER: ICD-10-CM

## 2022-04-27 DIAGNOSIS — E61.1 IRON DEFICIENCY: ICD-10-CM

## 2022-04-27 PROCEDURE — 99214 OFFICE O/P EST MOD 30 MIN: CPT | Performed by: FAMILY MEDICINE

## 2022-04-27 NOTE — PROGRESS NOTES
1. \"Have you been to the ER, urgent care clinic since your last visit? Hospitalized since your last visit? \" No    2. \"Have you seen or consulted any other health care providers outside of the [address] [place] [address] since your last visit? \" No     3. For patients aged 39-70: Has the patient had a colonoscopy / FIT/ Cologuard? No      If the patient is female:    4. For patients aged 41-77: Has the patient had a mammogram within the past 2 years? NA - based on age or sex      11. For patients aged 21-65: Has the patient had a pap smear?  NA - based on age or sex

## 2022-04-27 NOTE — PROGRESS NOTES
Angelita Crowell is a 74 y.o. male who was seen by synchronous (real-time) audio-video technology on 4/27/2022 for Follow Up Chronic Condition        Assessment & Plan:       ICD-10-CM ICD-9-CM    1. Essential hypertension  O38 209.0 METABOLIC PANEL, COMPREHENSIVE      CBC WITH AUTOMATED DIFF   2. Dyslipidemia  E78.5 272.4 LIPID PANEL   3. Hyperuricemia  E79.0 790.6 URIC ACID   4. Hyperglycemia  R73.9 790.29 HEMOGLOBIN A1C WITH EAG   5. Hypovitaminosis D  E55.9 268.9 VITAMIN D, 25 HYDROXY   6. Iron deficiency  E61.1 280.9 CBC WITH AUTOMATED DIFF   7. Prediabetes  R73.03 790.29    8. Screening for malignant neoplasm of prostate  Z12.5 V76.44 PSA SCREENING (SCREENING)   9. Thyroid disorder  E07.9 246.9 TSH 3RD GENERATION   10. COVID-19  U07.1 079.89 SARS-COV-2 AB, TOTAL     Subjective:   Angelita Crowell is seen for follow-up care. Hypertension: Patient has hypertension. Blood pressure has been stable. He is on lisinopril, amlodipine and HCTZ. We will continue current treatment plan. Denies headache, changes in vision or focal weakness. Dyslipidemia: Patient has dyslipidemia. He takes Lipitor. He will continue to exercise and take a diet low in polysaturated fats. Recheck labs. Rash: Patient has chronic rash. He takes Kenalog for the eczematous/dermatitis type rash on his torso, back and upper extremities. Would like a refill of medication. Prescription is sent in. BPH: Patient has BPH with LUTS symptoms. He has been followed up by the urologist.  ED: Patient has erectile dysfunction. He takes Cialis. Would like a refill of medication. Prescription is sent in. COVID-19: Patient states that he likely came into contact with COVID-19. He would like to have antibody testing done. Request is placed. Hypovitaminosis D: Patient has history of hypovitaminosis D. We will recheck labs. Health maintenance: I will check a PSA test for prostate cancer screening.       Prior to Admission medications Medication Sig Start Date End Date Taking? Authorizing Provider   hydroCHLOROthiazide (HYDRODIURIL) 25 mg tablet take 1 tablet by mouth once daily 1/10/22  Yes Sampson Mcelroy MD   amLODIPine (NORVASC) 5 mg tablet take 2 tablets by mouth once daily 1/10/22  Yes Sampson Mcelroy MD   lisinopriL (PRINIVIL, ZESTRIL) 40 mg tablet take 1 tablet by mouth once daily 12/29/21  Yes Sampson Mcelroy MD   atorvastatin (LIPITOR) 10 mg tablet Take 1 Tablet by mouth daily. 6/28/21  Yes Luther Vazquez MD   triamcinolone acetonide (KENALOG) 0.1 % topical cream Apply  to affected area two (2) times a day. use thin layer 9/17/19  Yes Luther Vazquez MD   urea (CARMOL) 40 % topical cream Apply  to affected area two (2) times a day. 11/3/17  Yes Florencio Holguin MD   aspirin 81 mg tablet Take 81 mg by mouth daily. Yes Provider, Historical   MULTIVITAMIN PO Take  by mouth daily. Yes Provider, Historical   permethrin (ACTICIN) 5 % topical cream apply sparingly as directed, Apply to entire body; leave on for 8 to 14 hours before washing off with water  Patient not taking: Reported on 4/27/2022 1/16/20   Luther Vazquez MD   ivermectin (STROMECTOL) 3 mg tablet Take 15 mg ( 5 tabs) on days 0,1,7,8,14,21,28  Patient not taking: Reported on 10/4/2021 12/19/19   Luther Vazquez MD   ivermectin 1 % crea Apply to affected area thin layer every 2-3 days  Patient not taking: Reported on 4/27/2022 12/19/19   Luther Vazquez MD   tadalafil (CIALIS) 10 mg tablet Take 1 Tab by mouth daily as needed. 6/14/18   Tej Grover, DO     ROS Review of all systems is negative except as noted above in the HPI.     Objective:     Patient-Reported Vitals 4/27/2022   Patient-Reported Weight 165   Constitutional: [x] Appears well-developed and well-nourished [x] No apparent distress     Mental status: [x] Alert and awake  [x] Oriented to person/place/time [x] Able to follow commands     HENT: [x] Normocephalic, atraumatic Pulmonary/Chest: [x] Respiratory effort normal   [x] No visualized signs of difficulty breathing or respiratory distress           Neurological:        [x] No Facial Asymmetry (Cranial nerve 7 motor function) (limited exam due to video visit)                   Psychiatric:       [x] Normal Affect    We discussed the expected course, resolution and complications of the diagnosis(es) in detail. Medication risks, benefits, costs, interactions, and alternatives were discussed as indicated. I advised him to contact the office if his condition worsens, changes or fails to improve as anticipated. He expressed understanding with the diagnosis(es) and plan. Rosa Elena Manriquez, was evaluated through a synchronous (real-time) audio-video encounter. The patient (or guardian if applicable) is aware that this is a billable service, which includes applicable co-pays. Verbal consent to proceed has been obtained. The visit was conducted pursuant to the emergency declaration under the [address] [place] [address], [address] [place] [address] authority and the [employer] General Act. Patient identification was verified, and a caregiver was present when appropriate. The patient was located at home in a state where the provider was licensed to provide care.       Maeve Betancourt MD

## 2022-05-12 ENCOUNTER — HOSPITAL ENCOUNTER (OUTPATIENT)
Dept: LAB | Age: 67
Discharge: HOME | End: 2022-05-12

## 2022-05-12 LAB
25(OH)D3 SERPL-MCNC: 48.1 NG/ML (ref 32–100)
A-G RATIO,AGRAT: 1.7 RATIO (ref 1.1–2.6)
ABSOLUTE LYMPHOCYTE COUNT, 10803: 1.9 K/UL (ref 1–4.8)
ALBUMIN SERPL-MCNC: 4.6 G/DL (ref 3.5–5)
ALP SERPL-CCNC: 91 U/L (ref 40–125)
ALT SERPL-CCNC: 16 U/L (ref 5–40)
ANION GAP SERPL CALC-SCNC: 12 MMOL/L (ref 3–15)
AST SERPL W P-5'-P-CCNC: 25 U/L (ref 10–37)
BASOPHILS # BLD: 0 K/UL (ref 0–0.2)
BASOPHILS NFR BLD: 1 % (ref 0–2)
BILIRUB SERPL-MCNC: 0.4 MG/DL (ref 0.2–1.2)
BUN SERPL-MCNC: 15 MG/DL (ref 6–22)
CALCIUM SERPL-MCNC: 9.4 MG/DL (ref 8.4–10.5)
CHLORIDE SERPL-SCNC: 104 MMOL/L (ref 98–110)
CHOLEST SERPL-MCNC: 193 MG/DL (ref 110–200)
CO2 SERPL-SCNC: 23 MMOL/L (ref 20–32)
CREAT SERPL-MCNC: 1.1 MG/DL (ref 0.8–1.6)
EOSINOPHIL # BLD: 0.4 K/UL (ref 0–0.5)
EOSINOPHIL NFR BLD: 7 % (ref 0–6)
ERYTHROCYTE [DISTWIDTH] IN BLOOD BY AUTOMATED COUNT: 12.7 % (ref 10–15.5)
GFRAA, 66117: >60
GFRNA, 66118: >60
GLOBULIN,GLOB: 2.7 G/DL (ref 2–4)
GLUCOSE SERPL-MCNC: 90 MG/DL (ref 70–99)
GRANULOCYTES,GRANS: 43 % (ref 40–75)
HCT VFR BLD AUTO: 44.9 % (ref 37.8–52.2)
HDLC SERPL-MCNC: 4.3 MG/DL (ref 0–5)
HDLC SERPL-MCNC: 45 MG/DL
HGB BLD-MCNC: 15 G/DL (ref 12.6–17.1)
LDL/HDL RATIO,LDHD: 3.1
LDLC SERPL CALC-MCNC: 138 MG/DL (ref 50–99)
LYMPHOCYTES, LYMLT: 39 % (ref 20–45)
MCH RBC QN AUTO: 31 PG (ref 26–34)
MCHC RBC AUTO-ENTMCNC: 33 G/DL (ref 31–36)
MCV RBC AUTO: 91 FL (ref 80–95)
MONOCYTES # BLD: 0.5 K/UL (ref 0.1–1)
MONOCYTES NFR BLD: 10 % (ref 3–12)
NEUTROPHILS # BLD AUTO: 2.2 K/UL (ref 1.8–7.7)
NON-HDL CHOLESTEROL, 011976: 149 MG/DL
PLATELET # BLD AUTO: 294 K/UL (ref 140–440)
PMV BLD AUTO: 11.2 FL (ref 9–13)
POTASSIUM SERPL-SCNC: 4.3 MMOL/L (ref 3.5–5.5)
PROT SERPL-MCNC: 7.3 G/DL (ref 6.2–8.1)
PSA SERPL-MCNC: 0.86 NG/ML
RBC # BLD AUTO: 4.91 M/UL (ref 3.8–5.8)
SARS-COV-2 AB: NON REACTIVE
SENTARA SPECIMEN COL,SENBCF: NORMAL
SENTARA SPECIMEN COL,SENBCF: NORMAL
SODIUM SERPL-SCNC: 139 MMOL/L (ref 133–145)
TRIGL SERPL-MCNC: 51 MG/DL (ref 40–149)
TSH SERPL DL<=0.005 MIU/L-ACNC: 1.4 MCU/ML (ref 0.27–4.2)
URATE SERPL-MCNC: 6.9 MG/DL (ref 3.9–9)
VLDLC SERPL CALC-MCNC: 10 MG/DL (ref 8–30)
WBC # BLD AUTO: 5 K/UL (ref 4–11)

## 2022-05-12 PROCEDURE — 99001 SPECIMEN HANDLING PT-LAB: CPT

## 2022-05-13 LAB
AVG GLU, 10930: 119 MG/DL (ref 91–123)
HBA1C MFR BLD HPLC: 5.8 % (ref 4.8–5.6)

## 2022-05-23 RX ORDER — ATORVASTATIN CALCIUM 20 MG/1
20 TABLET, FILM COATED ORAL DAILY
Qty: 30 TABLET | Refills: 3 | Status: SHIPPED | OUTPATIENT
Start: 2022-05-23

## 2022-05-23 NOTE — PROGRESS NOTES
Attempted to contact patient with no answer. Patient was left a VM and a letter will be sent out today.

## 2022-05-23 NOTE — PROGRESS NOTES
Please let patient know lipid level is elevated. His LDL is 138. Would want this to go down to below 100. He is on Lipitor 10 mg daily. I would recommend going up to 20 mg daily. I will send in a prescription at the new dose. His HbA1c is 5.8. This is in the prediabetes range but improved from previous. Rest of his labs are reassuring. COVID antibody test is negative.   Mariano Joseph MD

## 2022-07-25 ENCOUNTER — TELEPHONE (OUTPATIENT)
Dept: FAMILY MEDICINE CLINIC | Age: 67
End: 2022-07-25

## 2022-07-25 NOTE — TELEPHONE ENCOUNTER
Mr. Alvareztte Jac is requesting Dr Balderas Dear to place lab orders for his upcoming jaquelin 09/26.  The patient would like to have the results of his lab work before he comes to his jaquelin

## 2022-08-04 DIAGNOSIS — E61.1 IRON DEFICIENCY: ICD-10-CM

## 2022-08-04 DIAGNOSIS — I10 ESSENTIAL HYPERTENSION: ICD-10-CM

## 2022-08-04 DIAGNOSIS — E07.9 THYROID DISORDER: ICD-10-CM

## 2022-08-04 DIAGNOSIS — E79.0 HYPERURICEMIA: ICD-10-CM

## 2022-08-04 DIAGNOSIS — Z12.5 SCREENING FOR MALIGNANT NEOPLASM OF PROSTATE: ICD-10-CM

## 2022-08-04 DIAGNOSIS — E55.9 HYPOVITAMINOSIS D: ICD-10-CM

## 2022-08-04 DIAGNOSIS — E78.5 DYSLIPIDEMIA: ICD-10-CM

## 2022-08-04 DIAGNOSIS — R73.9 HYPERGLYCEMIA: ICD-10-CM

## 2022-08-04 DIAGNOSIS — U07.1 COVID-19: ICD-10-CM

## 2022-08-30 DIAGNOSIS — B86 SCABIES: ICD-10-CM

## 2022-08-30 RX ORDER — IVERMECTIN 3 MG/1
TABLET ORAL
Qty: 49 TABLET | Refills: 0 | Status: SHIPPED | OUTPATIENT
Start: 2022-08-30 | End: 2022-10-30

## 2022-08-30 RX ORDER — PERMETHRIN 50 MG/G
CREAM TOPICAL
Qty: 120 G | Refills: 2 | Status: SHIPPED | OUTPATIENT
Start: 2022-08-30 | End: 2022-09-28 | Stop reason: SDUPTHER

## 2022-09-21 DIAGNOSIS — I10 ESSENTIAL HYPERTENSION: ICD-10-CM

## 2022-09-21 RX ORDER — AMLODIPINE BESYLATE 5 MG/1
TABLET ORAL
Qty: 180 TABLET | Refills: 1 | Status: SHIPPED | OUTPATIENT
Start: 2022-09-21

## 2022-09-28 DIAGNOSIS — B86 SCABIES: ICD-10-CM

## 2022-09-28 RX ORDER — PERMETHRIN 50 MG/G
CREAM TOPICAL
Qty: 120 G | Refills: 2 | Status: SHIPPED | OUTPATIENT
Start: 2022-09-28

## 2022-09-28 NOTE — TELEPHONE ENCOUNTER
Please see refill request    Patient was last seen on 4-    Last prescribed on 8-  #120g x 2    Patient requesting 2 tubes    Thank you

## 2022-09-28 NOTE — TELEPHONE ENCOUNTER
Requested Prescriptions     Pending Prescriptions Disp Refills    permethrin (ACTICIN) 5 % topical cream 120 g 2     Sig: apply sparingly as directed, Apply to entire body; leave on for 8 to 14 hours before washing off with water        Pt is requesting 2 tubes of this medication cream

## 2022-10-04 ENCOUNTER — OFFICE VISIT (OUTPATIENT)
Dept: FAMILY MEDICINE CLINIC | Age: 67
End: 2022-10-04
Payer: COMMERCIAL

## 2022-10-04 VITALS
RESPIRATION RATE: 20 BRPM | DIASTOLIC BLOOD PRESSURE: 69 MMHG | TEMPERATURE: 98.2 F | WEIGHT: 161 LBS | HEIGHT: 67 IN | SYSTOLIC BLOOD PRESSURE: 151 MMHG | OXYGEN SATURATION: 100 % | HEART RATE: 96 BPM | BODY MASS INDEX: 25.27 KG/M2

## 2022-10-04 DIAGNOSIS — E78.5 DYSLIPIDEMIA: ICD-10-CM

## 2022-10-04 DIAGNOSIS — I10 ESSENTIAL HYPERTENSION: Primary | ICD-10-CM

## 2022-10-04 DIAGNOSIS — Z23 ENCOUNTER FOR IMMUNIZATION: ICD-10-CM

## 2022-10-04 DIAGNOSIS — R21 RASH: ICD-10-CM

## 2022-10-04 PROCEDURE — 99214 OFFICE O/P EST MOD 30 MIN: CPT | Performed by: FAMILY MEDICINE

## 2022-10-04 PROCEDURE — 90694 VACC AIIV4 NO PRSRV 0.5ML IM: CPT | Performed by: FAMILY MEDICINE

## 2022-10-04 PROCEDURE — G0008 ADMIN INFLUENZA VIRUS VAC: HCPCS | Performed by: FAMILY MEDICINE

## 2022-10-04 PROCEDURE — 1123F ACP DISCUSS/DSCN MKR DOCD: CPT | Performed by: FAMILY MEDICINE

## 2022-10-04 RX ORDER — SPIRONOLACTONE AND HYDROCHLOROTHIAZIDE 25; 25 MG/1; MG/1
1 TABLET ORAL DAILY
Qty: 30 TABLET | Refills: 2 | Status: SHIPPED | OUTPATIENT
Start: 2022-10-04 | End: 2022-10-30

## 2022-10-04 NOTE — PROGRESS NOTES
After obtaining consent, and per orders of Dr. Ramone Mace, injection of [facility] Adjuvanted was given by Rosalinda Huitron. Patient instructed to remain in clinic for 20 minutes afterwards, and to report any adverse reaction to me immediately.

## 2022-10-04 NOTE — PROGRESS NOTES
1. \"Have you been to the ER, urgent care clinic since your last visit? Hospitalized since your last visit? \" No    2. \"Have you seen or consulted any other health care providers outside of the [address] [place] [address] since your last visit? \" No     3. For patients aged 39-70: Has the patient had a colonoscopy / FIT/ Cologuard? No      If the patient is female:    4. For patients aged 41-77: Has the patient had a mammogram within the past 2 years? Yes - no Care Gap present      5. For patients aged 21-65: Has the patient had a pap smear?  NA - based on age or sex

## 2022-10-04 NOTE — PROGRESS NOTES
OLIVIA Allred Husbands comes in for follow-up care. Hypertension: Patient has hypertension. Blood pressure today is elevated. He denies headache, changes in vision or focal weakness. He is on amlodipine 5 mg daily, lisinopril 40 mg daily and HCTZ 25 mg. We will discontinue HCTZ and give Aldactazide 25/25 mg. He will continue with lisinopril 40 mg daily and amlodipine 5 mg daily. He will take a low-sodium diet. He will keep blood pressure log and I will follow-up at next visit. Dyslipidemia: Patient has dyslipidemia. He takes Lipitor. Has been stable on medication. He will continue to exercise and take a diet low in concentrated fats. We will recheck labs at next visit. Rash: Patient has history of skin rash. Has been diagnosed with eczema and ichthyosis. He also has had scabies like lesions. These are relieved when he uses permethrin. Currently he is stable. Health maintenance: Patient will get the flu vaccine today. Past Medical History  Past Medical History:   Diagnosis Date    BPH (benign prostatic hypertrophy) 10/15/2012    ED (erectile dysfunction) 9/30/2011    Hepatitis C     HTN (hypertension) 9/30/2011    Hyperlipidemia 9/30/2011       Surgical History  Past Surgical History:   Procedure Laterality Date    HX ACL RECONSTRUCTION      HX COLONOSCOPY          Medications  Current Outpatient Medications   Medication Sig Dispense Refill    spironolactone-hydrochlorothiazide (ALDACTAZIDE) 25-25 mg per tablet Take 1 Tablet by mouth daily. 30 Tablet 2    amLODIPine (NORVASC) 5 mg tablet take 2 tablets by mouth once daily 180 Tablet 1    atorvastatin (LIPITOR) 20 mg tablet Take 1 Tablet by mouth daily. 30 Tablet 3    lisinopriL (PRINIVIL, ZESTRIL) 40 mg tablet take 1 tablet by mouth once daily 90 Tablet 3    aspirin 81 mg tablet Take 81 mg by mouth daily. MULTIVITAMIN PO Take  by mouth daily.       permethrin (ACTICIN) 5 % topical cream apply sparingly as directed, Apply to entire body; leave on for 8 to 14 hours before washing off with water (Patient not taking: Reported on 10/4/2022) 120 g 2    ivermectin (STROMECTOL) 3 mg tablet Take 21 mg ( 7 tabs) on days 0,1,7,8,14,21,28 (Patient not taking: Reported on 10/4/2022) 49 Tablet 0    ivermectin 1 % crea Apply to affected area thin layer every 2-3 days (Patient not taking: No sig reported) 45 g 1    triamcinolone acetonide (KENALOG) 0.1 % topical cream Apply  to affected area two (2) times a day. use thin layer (Patient not taking: Reported on 10/4/2022) 454 g 0    tadalafil (CIALIS) 10 mg tablet Take 1 Tab by mouth daily as needed. (Patient not taking: Reported on 10/4/2022) 4 Tab 3    urea (CARMOL) 40 % topical cream Apply  to affected area two (2) times a day. (Patient not taking: Reported on 10/4/2022) 85 g 0       Allergies  Allergies   Allergen Reactions    Sean [Amlodipine-Olmesartan] Palpitations and Other (comments)     Fatigue, headache, pt states \"kidneys hurt\"    Atenolol Rash    Miralax [Polyethylene Glycol 3350] Swelling       Family History  Family History   Problem Relation Age of Onset    Hypertension Father     Alcohol abuse Father     Hypertension Mother     Hypertension Brother     Hypertension Brother        Social History  Social History     Socioeconomic History    Marital status:      Spouse name: Not on file    Number of children: Not on file    Years of education: Not on file    Highest education level: Not on file   Occupational History    Not on file   Tobacco Use    Smoking status: Former    Smokeless tobacco: Never   Vaping Use    Vaping Use: Not on file   Substance and Sexual Activity    Alcohol use:  Yes     Alcohol/week: 1.0 standard drink     Types: 1 Cans of beer per week    Drug use: No    Sexual activity: Yes   Other Topics Concern    Not on file   Social History Narrative    Not on file     Social Determinants of Health     Financial Resource Strain: Not on file   Food Insecurity: Not on file Transportation Needs: Not on file   Physical Activity: Not on file   Stress: Not on file   Social Connections: Not on file   Intimate Partner Violence: Not on file   Housing Stability: Not on file       Review of Systems  Review of Systems - Review of all systems is negative except as noted above in the HPI. Vital Signs  Visit Vitals  BP (!) 151/69   Pulse 96   Temp 98.2 °F (36.8 °C)   Resp 20   Ht 5' 7\" (1.702 m)   Wt 161 lb (73 kg)   SpO2 100%   BMI 25.22 kg/m²         Physical Exam  Physical Examination: General appearance - alert, well appearing, and in no distress, oriented to person, place, and time, and acyanotic, in no respiratory distress  Mental status - affect appropriate to mood  Lymphatics - no palpable lymphadenopathy  Chest - no tachypnea, retractions or cyanosis  Heart - S1 and S2 normal  Abdomen - no rebound tenderness noted  Back exam - limited range of motion  Neurological - normal muscle tone, no tremors, strength 5/5  Musculoskeletal - osteoarthritic changes noted in both hands  Extremities - no pedal edema noted, intact peripheral pulses    Results  Results for orders placed or performed during the hospital encounter of 05/12/22   [address] [place] [address]. Result Value Ref Range    [facility] Specimens collected/sent to [address] [place] [address]. Result Value Ref Range    [facility] Specimens collected/sent to [facility]         ASSESSMENT and PLAN    ICD-10-CM ICD-9-CM    1. Essential hypertension  I10 401.9 spironolactone-hydrochlorothiazide (ALDACTAZIDE) 25-25 mg per tablet      2. Dyslipidemia  E78.5 272.4       3. Rash  R21 782.1       4.  Encounter for immunization  Z23 V03.89 INFLUENZA, FLUAD, (AGE 72 Y+), IM, PF, 0.5 ML      IA IMMUNIZ ADMIN,1 SINGLE/COMB VAC/TOXOID        reviewed diet, exercise and weight control  cardiovascular risk and specific lipid/LDL goals reviewed  reviewed medications and side effects in detail      I have discussed the diagnosis with the patient and the intended plan of care as seen in the above orders. The patient has received an after-visit summary and questions were answered concerning future plans. I have discussed medication, side effects, and warnings with the patient in detail. The patient verbalized understanding and is in agreement with the plan of care. The patient will contact the office with any additional concerns. I spent at least 30 minutes on this visit with this established patient. Mansi Joe MD    PLEASE NOTE:   This document has been produced using voice recognition software.  Unrecognized errors in transcription may be present

## 2022-10-30 ENCOUNTER — APPOINTMENT (OUTPATIENT)
Dept: MRI IMAGING | Age: 67
DRG: 312 | End: 2022-10-30
Attending: INTERNAL MEDICINE
Payer: COMMERCIAL

## 2022-10-30 ENCOUNTER — APPOINTMENT (OUTPATIENT)
Dept: GENERAL RADIOLOGY | Age: 67
DRG: 312 | End: 2022-10-30
Attending: EMERGENCY MEDICINE
Payer: COMMERCIAL

## 2022-10-30 ENCOUNTER — APPOINTMENT (OUTPATIENT)
Dept: CT IMAGING | Age: 67
DRG: 312 | End: 2022-10-30
Attending: EMERGENCY MEDICINE
Payer: COMMERCIAL

## 2022-10-30 ENCOUNTER — HOSPITAL ENCOUNTER (INPATIENT)
Age: 67
LOS: 3 days | Discharge: HOME | DRG: 312 | End: 2022-11-02
Attending: EMERGENCY MEDICINE | Admitting: INTERNAL MEDICINE
Payer: COMMERCIAL

## 2022-10-30 DIAGNOSIS — R77.8 ELEVATED TROPONIN: ICD-10-CM

## 2022-10-30 DIAGNOSIS — R55 SYNCOPE, UNSPECIFIED SYNCOPE TYPE: Primary | ICD-10-CM

## 2022-10-30 DIAGNOSIS — U07.1 SARS-COV-2 POSITIVE: ICD-10-CM

## 2022-10-30 DIAGNOSIS — I10 PRIMARY HYPERTENSION: ICD-10-CM

## 2022-10-30 PROBLEM — N40.0 BPH (BENIGN PROSTATIC HYPERPLASIA): Status: ACTIVE | Noted: 2022-10-30

## 2022-10-30 PROBLEM — R79.89 ELEVATED TROPONIN: Status: ACTIVE | Noted: 2022-10-30

## 2022-10-30 PROBLEM — N40.0 BPH (BENIGN PROSTATIC HYPERPLASIA): Chronic | Status: ACTIVE | Noted: 2022-10-30

## 2022-10-30 LAB
ALBUMIN SERPL-MCNC: 4 G/DL (ref 3.4–5)
ALBUMIN/GLOB SERPL: 0.9 (ref 0.8–1.7)
ALP SERPL-CCNC: 79 U/L (ref 45–117)
ALT SERPL-CCNC: 26 U/L (ref 16–61)
AMPHET UR QL SCN: NEGATIVE
ANION GAP SERPL CALC-SCNC: 7 MMOL/L (ref 3–18)
APPEARANCE UR: CLEAR
AST SERPL-CCNC: 19 U/L (ref 10–38)
ATRIAL RATE: 84 BPM
ATRIAL RATE: 87 BPM
B PERT DNA SPEC QL NAA+PROBE: NOT DETECTED
BARBITURATES UR QL SCN: NEGATIVE
BASOPHILS # BLD: 0 K/UL (ref 0–0.1)
BASOPHILS NFR BLD: 0 % (ref 0–2)
BENZODIAZ UR QL: NEGATIVE
BILIRUB SERPL-MCNC: 0.3 MG/DL (ref 0.2–1)
BILIRUB UR QL: NEGATIVE
BNP SERPL-MCNC: 32 PG/ML (ref 0–900)
BORDETELLA PARAPERTUSSIS PCR, BORPAR: NOT DETECTED
BUN SERPL-MCNC: 16 MG/DL (ref 7–18)
BUN/CREAT SERPL: 12 (ref 12–20)
C PNEUM DNA SPEC QL NAA+PROBE: NOT DETECTED
CALCIUM SERPL-MCNC: 8.6 MG/DL (ref 8.5–10.1)
CALCULATED P AXIS, ECG09: 64 DEGREES
CALCULATED P AXIS, ECG09: 64 DEGREES
CALCULATED R AXIS, ECG10: 60 DEGREES
CALCULATED R AXIS, ECG10: 65 DEGREES
CALCULATED T AXIS, ECG11: 64 DEGREES
CALCULATED T AXIS, ECG11: 72 DEGREES
CANNABINOIDS UR QL SCN: NEGATIVE
CHLORIDE SERPL-SCNC: 103 MMOL/L (ref 100–111)
CO2 SERPL-SCNC: 28 MMOL/L (ref 21–32)
COCAINE UR QL SCN: NEGATIVE
COLOR UR: YELLOW
CREAT SERPL-MCNC: 1.39 MG/DL (ref 0.6–1.3)
DIAGNOSIS, 93000: NORMAL
DIAGNOSIS, 93000: NORMAL
DIFFERENTIAL METHOD BLD: ABNORMAL
EOSINOPHIL # BLD: 0 K/UL (ref 0–0.4)
EOSINOPHIL NFR BLD: 0 % (ref 0–5)
ERYTHROCYTE [DISTWIDTH] IN BLOOD BY AUTOMATED COUNT: 12.5 % (ref 11.6–14.5)
ETHANOL SERPL-MCNC: <3 MG/DL (ref 0–3)
FLUAV H1 2009 PAND RNA SPEC QL NAA+PROBE: NOT DETECTED
FLUAV H1 RNA SPEC QL NAA+PROBE: NOT DETECTED
FLUAV H3 RNA SPEC QL NAA+PROBE: NOT DETECTED
FLUAV SUBTYP SPEC NAA+PROBE: NOT DETECTED
FLUBV RNA SPEC QL NAA+PROBE: NOT DETECTED
GLOBULIN SER CALC-MCNC: 4.3 G/DL (ref 2–4)
GLUCOSE SERPL-MCNC: 133 MG/DL (ref 74–99)
GLUCOSE UR STRIP.AUTO-MCNC: NEGATIVE MG/DL
HADV DNA SPEC QL NAA+PROBE: NOT DETECTED
HCOV 229E RNA SPEC QL NAA+PROBE: NOT DETECTED
HCOV HKU1 RNA SPEC QL NAA+PROBE: NOT DETECTED
HCOV NL63 RNA SPEC QL NAA+PROBE: NOT DETECTED
HCOV OC43 RNA SPEC QL NAA+PROBE: NOT DETECTED
HCT VFR BLD AUTO: 46 % (ref 36–48)
HDSCOM,HDSCOM: NORMAL
HGB BLD-MCNC: 16 G/DL (ref 13–16)
HGB UR QL STRIP: NEGATIVE
HMPV RNA SPEC QL NAA+PROBE: NOT DETECTED
HPIV1 RNA SPEC QL NAA+PROBE: NOT DETECTED
HPIV2 RNA SPEC QL NAA+PROBE: NOT DETECTED
HPIV3 RNA SPEC QL NAA+PROBE: NOT DETECTED
HPIV4 RNA SPEC QL NAA+PROBE: NOT DETECTED
IMM GRANULOCYTES # BLD AUTO: 0 K/UL (ref 0–0.04)
IMM GRANULOCYTES NFR BLD AUTO: 0 % (ref 0–0.5)
KETONES UR QL STRIP.AUTO: ABNORMAL MG/DL
LEUKOCYTE ESTERASE UR QL STRIP.AUTO: NEGATIVE
LYMPHOCYTES # BLD: 2.2 K/UL (ref 0.9–3.6)
LYMPHOCYTES NFR BLD: 45 % (ref 21–52)
M PNEUMO DNA SPEC QL NAA+PROBE: NOT DETECTED
MAGNESIUM SERPL-MCNC: 2 MG/DL (ref 1.6–2.6)
MCH RBC QN AUTO: 31.1 PG (ref 24–34)
MCHC RBC AUTO-ENTMCNC: 34.8 G/DL (ref 31–37)
MCV RBC AUTO: 89.3 FL (ref 78–100)
METHADONE UR QL: NEGATIVE
MONOCYTES # BLD: 0.6 K/UL (ref 0.05–1.2)
MONOCYTES NFR BLD: 12 % (ref 3–10)
NEUTS SEG # BLD: 2.1 K/UL (ref 1.8–8)
NEUTS SEG NFR BLD: 43 % (ref 40–73)
NITRITE UR QL STRIP.AUTO: NEGATIVE
NRBC # BLD: 0 K/UL (ref 0–0.01)
NRBC BLD-RTO: 0 PER 100 WBC
OPIATES UR QL: NEGATIVE
P-R INTERVAL, ECG05: 128 MS
P-R INTERVAL, ECG05: 132 MS
PCP UR QL: NEGATIVE
PH UR STRIP: 7 (ref 5–8)
PLATELET # BLD AUTO: 257 K/UL (ref 135–420)
PMV BLD AUTO: 9.8 FL (ref 9.2–11.8)
POTASSIUM SERPL-SCNC: 3.7 MMOL/L (ref 3.5–5.5)
PROT SERPL-MCNC: 8.3 G/DL (ref 6.4–8.2)
PROT UR STRIP-MCNC: NEGATIVE MG/DL
Q-T INTERVAL, ECG07: 376 MS
Q-T INTERVAL, ECG07: 380 MS
QRS DURATION, ECG06: 86 MS
QRS DURATION, ECG06: 88 MS
QTC CALCULATION (BEZET), ECG08: 449 MS
QTC CALCULATION (BEZET), ECG08: 452 MS
RBC # BLD AUTO: 5.15 M/UL (ref 4.35–5.65)
RSV RNA SPEC QL NAA+PROBE: NOT DETECTED
RV+EV RNA SPEC QL NAA+PROBE: NOT DETECTED
SARS-COV-2 PCR, COVPCR: DETECTED
SODIUM SERPL-SCNC: 138 MMOL/L (ref 136–145)
SP GR UR REFRACTOMETRY: 1.02 (ref 1–1.03)
TROPONIN-HIGH SENSITIVITY: 202 NG/L (ref 0–78)
TROPONIN-HIGH SENSITIVITY: 207 NG/L (ref 0–78)
TROPONIN-HIGH SENSITIVITY: 211 NG/L (ref 0–78)
UROBILINOGEN UR QL STRIP.AUTO: 1 EU/DL (ref 0.2–1)
VENTRICULAR RATE, ECG03: 84 BPM
VENTRICULAR RATE, ECG03: 87 BPM
WBC # BLD AUTO: 5 K/UL (ref 4.6–13.2)

## 2022-10-30 PROCEDURE — 81003 URINALYSIS AUTO W/O SCOPE: CPT

## 2022-10-30 PROCEDURE — 71045 X-RAY EXAM CHEST 1 VIEW: CPT

## 2022-10-30 PROCEDURE — 0202U NFCT DS 22 TRGT SARS-COV-2: CPT

## 2022-10-30 PROCEDURE — 70547 MR ANGIOGRAPHY NECK W/O DYE: CPT

## 2022-10-30 PROCEDURE — 36415 COLL VENOUS BLD VENIPUNCTURE: CPT

## 2022-10-30 PROCEDURE — 74011250637 HC RX REV CODE- 250/637: Performed by: INTERNAL MEDICINE

## 2022-10-30 PROCEDURE — 74011000250 HC RX REV CODE- 250: Performed by: INTERNAL MEDICINE

## 2022-10-30 PROCEDURE — 82077 ASSAY SPEC XCP UR&BREATH IA: CPT

## 2022-10-30 PROCEDURE — G0378 HOSPITAL OBSERVATION PER HR: HCPCS

## 2022-10-30 PROCEDURE — 96372 THER/PROPH/DIAG INJ SC/IM: CPT

## 2022-10-30 PROCEDURE — 65270000046 HC RM TELEMETRY

## 2022-10-30 PROCEDURE — 85025 COMPLETE CBC W/AUTO DIFF WBC: CPT

## 2022-10-30 PROCEDURE — 83735 ASSAY OF MAGNESIUM: CPT

## 2022-10-30 PROCEDURE — 99285 EMERGENCY DEPT VISIT HI MDM: CPT

## 2022-10-30 PROCEDURE — 70450 CT HEAD/BRAIN W/O DYE: CPT

## 2022-10-30 PROCEDURE — 93005 ELECTROCARDIOGRAM TRACING: CPT

## 2022-10-30 PROCEDURE — 80307 DRUG TEST PRSMV CHEM ANLYZR: CPT

## 2022-10-30 PROCEDURE — 99223 1ST HOSP IP/OBS HIGH 75: CPT | Performed by: INTERNAL MEDICINE

## 2022-10-30 PROCEDURE — 74011250636 HC RX REV CODE- 250/636: Performed by: INTERNAL MEDICINE

## 2022-10-30 PROCEDURE — 84484 ASSAY OF TROPONIN QUANT: CPT

## 2022-10-30 PROCEDURE — 70544 MR ANGIOGRAPHY HEAD W/O DYE: CPT

## 2022-10-30 PROCEDURE — 83880 ASSAY OF NATRIURETIC PEPTIDE: CPT

## 2022-10-30 PROCEDURE — 80053 COMPREHEN METABOLIC PANEL: CPT

## 2022-10-30 PROCEDURE — 70551 MRI BRAIN STEM W/O DYE: CPT

## 2022-10-30 RX ORDER — ASPIRIN 81 MG/1
81 TABLET ORAL DAILY
Status: DISCONTINUED | OUTPATIENT
Start: 2022-10-30 | End: 2022-11-02 | Stop reason: HOSPADM

## 2022-10-30 RX ORDER — HYDROCHLOROTHIAZIDE 25 MG/1
25 TABLET ORAL DAILY
COMMUNITY
End: 2022-12-14

## 2022-10-30 RX ORDER — ONDANSETRON 4 MG/1
4 TABLET, ORALLY DISINTEGRATING ORAL
Status: DISCONTINUED | OUTPATIENT
Start: 2022-10-30 | End: 2022-11-02 | Stop reason: HOSPADM

## 2022-10-30 RX ORDER — ATORVASTATIN CALCIUM 40 MG/1
80 TABLET, FILM COATED ORAL
Status: DISCONTINUED | OUTPATIENT
Start: 2022-10-30 | End: 2022-10-31

## 2022-10-30 RX ORDER — GUAIFENESIN 100 MG/5ML
81 LIQUID (ML) ORAL DAILY
Status: DISCONTINUED | OUTPATIENT
Start: 2022-10-31 | End: 2022-10-30

## 2022-10-30 RX ORDER — SODIUM CHLORIDE 0.9 % (FLUSH) 0.9 %
5-40 SYRINGE (ML) INJECTION AS NEEDED
Status: DISCONTINUED | OUTPATIENT
Start: 2022-10-30 | End: 2022-11-02 | Stop reason: HOSPADM

## 2022-10-30 RX ORDER — IPRATROPIUM BROMIDE AND ALBUTEROL SULFATE 2.5; .5 MG/3ML; MG/3ML
3 SOLUTION RESPIRATORY (INHALATION)
Status: DISCONTINUED | OUTPATIENT
Start: 2022-10-30 | End: 2022-11-02 | Stop reason: HOSPADM

## 2022-10-30 RX ORDER — CHOLECALCIFEROL (VITAMIN D3) 125 MCG
5 CAPSULE ORAL
Status: DISCONTINUED | OUTPATIENT
Start: 2022-10-30 | End: 2022-11-02 | Stop reason: HOSPADM

## 2022-10-30 RX ORDER — ONDANSETRON 2 MG/ML
4 INJECTION INTRAMUSCULAR; INTRAVENOUS
Status: DISCONTINUED | OUTPATIENT
Start: 2022-10-30 | End: 2022-11-02 | Stop reason: HOSPADM

## 2022-10-30 RX ORDER — SODIUM CHLORIDE 0.9 % (FLUSH) 0.9 %
5-40 SYRINGE (ML) INJECTION EVERY 8 HOURS
Status: DISCONTINUED | OUTPATIENT
Start: 2022-10-30 | End: 2022-11-02 | Stop reason: HOSPADM

## 2022-10-30 RX ORDER — ENOXAPARIN SODIUM 100 MG/ML
40 INJECTION SUBCUTANEOUS EVERY 24 HOURS
Status: DISCONTINUED | OUTPATIENT
Start: 2022-10-30 | End: 2022-11-02 | Stop reason: HOSPADM

## 2022-10-30 RX ORDER — THERA TABS 400 MCG
1 TAB ORAL DAILY
Status: DISCONTINUED | OUTPATIENT
Start: 2022-10-31 | End: 2022-11-02 | Stop reason: HOSPADM

## 2022-10-30 RX ORDER — ACETAMINOPHEN 325 MG/1
650 TABLET ORAL
Status: DISCONTINUED | OUTPATIENT
Start: 2022-10-30 | End: 2022-11-02 | Stop reason: HOSPADM

## 2022-10-30 RX ORDER — LABETALOL HCL 20 MG/4 ML
5 SYRINGE (ML) INTRAVENOUS
Status: DISCONTINUED | OUTPATIENT
Start: 2022-10-30 | End: 2022-11-02 | Stop reason: HOSPADM

## 2022-10-30 RX ADMIN — SODIUM CHLORIDE, PRESERVATIVE FREE 10 ML: 5 INJECTION INTRAVENOUS at 16:56

## 2022-10-30 RX ADMIN — ASPIRIN 81 MG: 81 TABLET, COATED ORAL at 19:01

## 2022-10-30 RX ADMIN — SODIUM CHLORIDE, PRESERVATIVE FREE 10 ML: 5 INJECTION INTRAVENOUS at 22:16

## 2022-10-30 RX ADMIN — ENOXAPARIN SODIUM 40 MG: 100 INJECTION SUBCUTANEOUS at 18:06

## 2022-10-30 RX ADMIN — ATORVASTATIN CALCIUM 80 MG: 40 TABLET, FILM COATED ORAL at 22:12

## 2022-10-30 NOTE — ED TRIAGE NOTES
Patient presented to the ed for syncopal episode. Pt states that he was in the kitchen when he started to feel sick. He proceeded to have a bowel movement when he started to notice he was sweating, however, denies straining. At that moment pt started to feel weak. Pt states that he passed out at that moment and his wife found him.

## 2022-10-30 NOTE — ED PROVIDER NOTES
Patient is a 20-year-old male with past medical history significant for hypertension, BPH, hepatitis C, and hyperlipidemia who was brought to the ED today by EMS for chief complaint of dizziness. His wife thinks that he had a TIA. The wife provided most of the history for the patient. She states that the patient went in the bathroom. She had heard a noise like the patient was throwing up and she went into the bathroom to check on the patient. She states that the patient was sitting on the toilet with vomit all over the front of his shirt. His head was leaning to back against the wall and his head was tilted to the side. His eyes were rolled back in his head and his left lower lip was hanging down and he was drooling. He came to and the patient's wife states that he could not focus his eyes for approximately 2 to 3 minutes. After that he was able to sit up and focused on her. When he was more alert he stated that he does not remember what had happened. The patient has had a similar episode in the past.  It was when he was prepping for a colonoscopy and he was drinking GoLytely the day before. At that time he went to urgent care and was told that it is most likely an allergic reaction to the colonoscopy prep. The wife states that the patient has also had some flu symptoms and a cough. He does have his flu shot. However, he did go to [place] to see a [employer] given with his sons in [place] and he came back sick. He took a COVID test at home and that was negative.              Past Medical History:   Diagnosis Date    BPH (benign prostatic hypertrophy) 10/15/2012    ED (erectile dysfunction) 9/30/2011    Hepatitis C     HTN (hypertension) 9/30/2011    Hyperlipidemia 9/30/2011       Past Surgical History:   Procedure Laterality Date    HX ACL RECONSTRUCTION      HX COLONOSCOPY           Family History:   Problem Relation Age of Onset    Hypertension Father     Alcohol abuse Father Hypertension Mother     Hypertension Brother     Hypertension Brother        Social History     Socioeconomic History    Marital status:      Spouse name: Not on file    Number of children: Not on file    Years of education: Not on file    Highest education level: Not on file   Occupational History    Not on file   Tobacco Use    Smoking status: Former    Smokeless tobacco: Never   Vaping Use    Vaping Use: Not on file   Substance and Sexual Activity    Alcohol use: Yes     Alcohol/week: 1.0 standard drink     Types: 1 Cans of beer per week    Drug use: No    Sexual activity: Yes   Other Topics Concern    Not on file   Social History Narrative    Not on file     Social Determinants of Health     Financial Resource Strain: Not on file   Food Insecurity: Not on file   Transportation Needs: Not on file   Physical Activity: Not on file   Stress: Not on file   Social Connections: Not on file   Intimate Partner Violence: Not on file   Housing Stability: Not on file         ALLERGIES: Sean [amlodipine-olmesartan], Atenolol, and Miralax [polyethylene glycol 3350]    Review of Systems   All other systems reviewed and are negative. Vitals:    10/30/22 1239   BP: 126/74   Pulse: 92   Resp: 16   Temp: 98.2 °F (36.8 °C)   SpO2: 99%   Weight: 72.6 kg (160 lb)   Height: 5' 7\" (1.702 m)            Physical Exam  Vitals and nursing note reviewed. Constitutional:       Appearance: Normal appearance. HENT:      Head: Normocephalic and atraumatic. Right Ear: External ear normal.      Left Ear: External ear normal.      Nose: Nose normal.      Mouth/Throat:      Mouth: Mucous membranes are moist.      Pharynx: Oropharynx is clear. Eyes:      Extraocular Movements: Extraocular movements intact. Conjunctiva/sclera: Conjunctivae normal.      Pupils: Pupils are equal, round, and reactive to light. Cardiovascular:      Rate and Rhythm: Normal rate and regular rhythm.    Pulmonary:      Effort: Pulmonary effort is normal.      Breath sounds: Normal breath sounds. Abdominal:      General: Abdomen is flat. Bowel sounds are normal.      Palpations: Abdomen is soft. Musculoskeletal:         General: Normal range of motion. Cervical back: Normal range of motion and neck supple. Skin:     General: Skin is warm and dry. Capillary Refill: Capillary refill takes less than 2 seconds. Neurological:      General: No focal deficit present. Mental Status: He is alert and oriented to person, place, and time. Cranial Nerves: No cranial nerve deficit. Sensory: No sensory deficit. Motor: No weakness. Coordination: Coordination normal.   Psychiatric:         Mood and Affect: Mood normal.         Behavior: Behavior normal.         Thought Content: Thought content normal.         Judgment: Judgment normal.        Recent Results (from the past 12 hour(s))   CBC WITH AUTOMATED DIFF    Collection Time: 10/30/22 12:44 PM   Result Value Ref Range    WBC 5.0 4.6 - 13.2 K/uL    RBC 5.15 4.35 - 5.65 M/uL    HGB 16.0 13.0 - 16.0 g/dL    HCT 46.0 36.0 - 48.0 %    MCV 89.3 78.0 - 100.0 FL    MCH 31.1 24.0 - 34.0 PG    MCHC 34.8 31.0 - 37.0 g/dL    RDW 12.5 11.6 - 14.5 %    PLATELET 500 849 - 605 K/uL    MPV 9.8 9.2 - 11.8 FL    NRBC 0.0 0  WBC    ABSOLUTE NRBC 0.00 0.00 - 0.01 K/uL    NEUTROPHILS 43 40 - 73 %    LYMPHOCYTES 45 21 - 52 %    MONOCYTES 12 (H) 3 - 10 %    EOSINOPHILS 0 0 - 5 %    BASOPHILS 0 0 - 2 %    IMMATURE GRANULOCYTES 0 0.0 - 0.5 %    ABS. NEUTROPHILS 2.1 1.8 - 8.0 K/UL    ABS. LYMPHOCYTES 2.2 0.9 - 3.6 K/UL    ABS. MONOCYTES 0.6 0.05 - 1.2 K/UL    ABS. EOSINOPHILS 0.0 0.0 - 0.4 K/UL    ABS. BASOPHILS 0.0 0.0 - 0.1 K/UL    ABS. IMM.  GRANS. 0.0 0.00 - 0.04 K/UL    DF AUTOMATED     METABOLIC PANEL, COMPREHENSIVE    Collection Time: 10/30/22 12:44 PM   Result Value Ref Range    Sodium 138 136 - 145 mmol/L    Potassium 3.7 3.5 - 5.5 mmol/L    Chloride 103 100 - 111 mmol/L    CO2 28 21 - 32 mmol/L    Anion gap 7 3.0 - 18 mmol/L    Glucose 133 (H) 74 - 99 mg/dL    BUN 16 7.0 - 18 MG/DL    Creatinine 1.39 (H) 0.6 - 1.3 MG/DL    BUN/Creatinine ratio 12 12 - 20      eGFR 55 (L) >60 ml/min/1.73m2    Calcium 8.6 8.5 - 10.1 MG/DL    Bilirubin, total 0.3 0.2 - 1.0 MG/DL    ALT (SGPT) 26 16 - 61 U/L    AST (SGOT) 19 10 - 38 U/L    Alk.  phosphatase 79 45 - 117 U/L    Protein, total 8.3 (H) 6.4 - 8.2 g/dL    Albumin 4.0 3.4 - 5.0 g/dL    Globulin 4.3 (H) 2.0 - 4.0 g/dL    A-G Ratio 0.9 0.8 - 1.7     NT-PRO BNP    Collection Time: 10/30/22 12:44 PM   Result Value Ref Range    NT pro-BNP 32 0 - 900 PG/ML   TROPONIN-HIGH SENSITIVITY    Collection Time: 10/30/22 12:44 PM   Result Value Ref Range    Troponin-High Sensitivity 207 (HH) 0 - 78 ng/L   ETHYL ALCOHOL    Collection Time: 10/30/22 12:44 PM   Result Value Ref Range    ALCOHOL(ETHYL),SERUM <3 0 - 3 MG/DL   MAGNESIUM    Collection Time: 10/30/22 12:44 PM   Result Value Ref Range    Magnesium 2.0 1.6 - 2.6 mg/dL   EKG, 12 LEAD, INITIAL    Collection Time: 10/30/22 12:49 PM   Result Value Ref Range    Ventricular Rate 84 BPM    Atrial Rate 84 BPM    P-R Interval 132 ms    QRS Duration 86 ms    Q-T Interval 380 ms    QTC Calculation (Bezet) 449 ms    Calculated P Axis 64 degrees    Calculated R Axis 60 degrees    Calculated T Axis 72 degrees    Diagnosis       Poor data quality, interpretation may be adversely affected  Sinus rhythm with premature atrial complexes  Nonspecific T wave abnormality  Abnormal ECG  When compared with ECG of 14-NOV-2014 12:46,  premature atrial complexes are now present     EKG, 12 LEAD, SUBSEQUENT    Collection Time: 10/30/22  1:10 PM   Result Value Ref Range    Ventricular Rate 87 BPM    Atrial Rate 87 BPM    P-R Interval 128 ms    QRS Duration 88 ms    Q-T Interval 376 ms    QTC Calculation (Bezet) 452 ms    Calculated P Axis 64 degrees    Calculated R Axis 65 degrees    Calculated T Axis 64 degrees    Diagnosis       Sinus rhythm with premature atrial complexes  Nonspecific T wave abnormality  Abnormal ECG  When compared with ECG of 30-OCT-2022 12:49,  No significant change was found     URINALYSIS W/ RFLX MICROSCOPIC    Collection Time: 10/30/22  3:48 PM   Result Value Ref Range    Color YELLOW      Appearance CLEAR      Specific gravity 1.019 1.005 - 1.030      pH (UA) 7.0 5.0 - 8.0      Protein Negative NEG mg/dL    Glucose Negative NEG mg/dL    Ketone TRACE (A) NEG mg/dL    Bilirubin Negative NEG      Blood Negative NEG      Urobilinogen 1.0 0.2 - 1.0 EU/dL    Nitrites Negative NEG      Leukocyte Esterase Negative NEG     DRUG SCREEN, URINE    Collection Time: 10/30/22  3:48 PM   Result Value Ref Range    BENZODIAZEPINES Negative NEG      BARBITURATES Negative NEG      THC (TH-CANNABINOL) Negative NEG      OPIATES Negative NEG      PCP(PHENCYCLIDINE) Negative NEG      COCAINE Negative NEG      AMPHETAMINES Negative NEG      METHADONE Negative NEG      HDSCOM (NOTE)    RESPIRATORY VIRUS PANEL W/COVID-19, PCR    Collection Time: 10/30/22  3:48 PM    Specimen: Nasopharyngeal   Result Value Ref Range    Adenovirus Not detected NOTD      Coronavirus 229E Not detected NOTD      Coronavirus HKU1 Not detected NOTD      Coronavirus CVNL63 Not detected NOTD      Coronavirus OC43 Not detected NOTD      SARS-CoV-2, PCR Detected (AA) NOTD      Metapneumovirus Not detected NOTD      Rhinovirus and Enterovirus Not detected NOTD      Influenza A Not detected NOTD      Influenza A, subtype H1 Not detected NOTD      Influenza A, subtype H3 Not detected NOTD      INFLUENZA A H1N1 PCR Not detected NOTD      Influenza B Not detected NOTD      Parainfluenza 1 Not detected NOTD      Parainfluenza 2 Not detected NOTD      Parainfluenza 3 Not detected NOTD      Parainfluenza virus 4 Not detected NOTD      RSV by PCR Not detected NOTD      B. parapertussis, PCR Not detected NOTD      Bordetella pertussis - PCR Not detected NOTD      Chlamydophila pneumoniae DNA, QL, PCR Not detected NOTD      Mycoplasma pneumoniae DNA, QL, PCR Not detected NOTD     TROPONIN-HIGH SENSITIVITY    Collection Time: 10/30/22  4:11 PM   Result Value Ref Range    Troponin-High Sensitivity 211 (HH) 0 - 78 ng/L     CT HEAD WO CONT   Final Result      1. No evidence of acute intracranial process   2. Mild chronic small vessel ischemic disease         XR CHEST PORT   Final Result   1. No active cardiopulmonary disease                        MDM  Number of Diagnoses or Management Options  Syncope, unspecified syncope type [X05 (ICD-10-CM)]  Diagnosis management comments: The patient is a 68-year-old male with past medical history significant for hypertension, hyperlipidemia, and hepatitis C who was brought to the ED today where he had an episode of unresponsiveness at home. My differential diagnosis includes seizure versus arrhythmia versus TIA vs convulsive syncope. His EKG shows normal sinus rhythm at a rate of 84 with ST depression in 2 3 and aVF and very slight ST elevation in aVR. All of these are less than 1 mm. His repeat EKG is unchanged. His troponin is positive at 207. His second troponin is 211. He is COVID-positive. His head CT is negative and his chest x-ray shows no signs of pneumonia. I have consulted the hospitalist for admission for further evaluation management. They have accepted the patient on their service.            Procedures

## 2022-10-30 NOTE — H&P
History and Physical    Patient: Kelechi Alba MRN: [id number]  SSN: [id number]    YOB: 1955  Age: 69 y.o. Sex: male      Subjective:      Kelechi Alba is a 69 y.o. male who presents to [facility] ER with complaint of \"Unresponsive Episode. \"  Patient reports that he was feeling unwell today while emptying his  and went and sat down on his toilet where he then had 3 bowel movements in rapid succession and described himself as moderately to severely diaphoretic. Patient reports that at that point he \"fell out\" and slowly awoke to his wife calling his name and asking him if he was okay. Patient reports that he had vomited all over himself and was slowly regaining his wits. Patient also notes that his tongue was swollen from where he had apparently bitten it while he was unconscious, but notes that he did not fall off the toilet. Per [facility] ER physician, patient's wife felt that patient had some facial droop at this time that rapidly resolved. Patient remarks that approximately 6 months ago he had a colonoscopy during which time he had a similar event where he bit his tongue and could not tolerate bowel preparation. Patient states that he cannot remember these what occurred while he was \"falling out\" and also states that he had bitten his tongue last time this happened as well. Notably, patient was sitting on a toilet seat and cannot report whether he was incontinent of bowel or bladder during the situation. Notably, Patient remarks that he was initially thought allergic to bowel preparation when this first occurred to him. Consequently, Patient reports that he was unable to receive COVID-19 vaccinations due to some similar component in the injection as well as the bowel preparation. Therefore, Patient has had no vaccinations or boosters to COVID-19, but reports that he has had his flu shot as well as his pneumonia shots.     In [facility] ER, Patient is noted to have Temperature 98.2°F, Heart Rate 92 bpm, Respiration Rate 24 bpm, Blood Pressure 121/71 mm Hg to 158/68 mm Hg, SpO2 96% on Room Air, WBC 5.0, Neut% 43%, Neut# 2.1, UA (-), Glucose 133 mg/dL, Creatinine 1.39 mg/dL (Baseline Creatinine ~1.1 mg/dL), eGFR 55, Troponin 207 ng/L, Pro-BNP 32 ng/L, UDS (-), and ETOH <3 mg/dL. Patient is admitted to [facility] Telemetry Unit for management of TIA/CVA versus Syncope versus possible Dysrhythmia with Elevated Troponin. Past Medical History:   Diagnosis Date    BPH (benign prostatic hypertrophy) 10/15/2012    ED (erectile dysfunction) 9/30/2011    Hepatitis C     HTN (hypertension) 9/30/2011    Hyperlipidemia 9/30/2011     Past Surgical History:   Procedure Laterality Date    HX ACL RECONSTRUCTION      HX COLONOSCOPY        Family History   Problem Relation Age of Onset    Hypertension Father     Alcohol abuse Father     Hypertension Mother     Hypertension Brother     Hypertension Brother      Social History     Tobacco Use    Smoking status: Former    Smokeless tobacco: Never   Substance Use Topics    Alcohol use: Yes     Alcohol/week: 1.0 standard drink     Types: 1 Cans of beer per week      Prior to Admission medications    Medication Sig Start Date End Date Taking? Authorizing Provider   hydroCHLOROthiazide (HYDRODIURIL) 25 mg tablet Take 25 mg by mouth daily. Yes Provider, Historical   amLODIPine (NORVASC) 5 mg tablet take 2 tablets by mouth once daily 9/21/22  Yes Luther Vazquez MD   atorvastatin (LIPITOR) 20 mg tablet Take 1 Tablet by mouth daily. 5/23/22  Yes Luther Vazquez MD   lisinopriL (PRINIVIL, ZESTRIL) 40 mg tablet take 1 tablet by mouth once daily 12/29/21  Yes Gi Lewis MD   aspirin 81 mg tablet Take 81 mg by mouth daily. Yes Provider, Historical   MULTIVITAMIN PO Take  by mouth daily.    Yes Provider, Historical   permethrin (ACTICIN) 5 % topical cream apply sparingly as directed, Apply to entire body; leave on for 8 to 14 hours before washing off with water  Patient not taking: No sig reported 9/28/22   Luther Vazquez MD        Allergies   Allergen Reactions    Sean [Amlodipine-Olmesartan] Palpitations and Other (comments)     Fatigue, headache, pt states \"kidneys hurt\" (tolerates amlodipine alone)    Atenolol Rash    Miralax [Polyethylene Glycol 3350] Swelling       Review of Systems:  (-) Fevers  (-) Chills  (-) Cough  (-) Increased Sputum Production  (-) Chest Pain  (-) Abdominal Pain  (+) Nausea  (+) Vomiting  (+) Diarrhea  (-) Dysuria  (?) Incontinence  (-) Expressive/Receptive Aphasia  (-) Focal Weakness  (+) Loss of Consciousness  (+) Memory Loss  (?) Seizure  (-) Loss of Sensation  All other systems have been reviewed and are negative      Objective:     Vitals:    10/30/22 1828 10/30/22 1843 10/30/22 1858 10/30/22 1947   BP: (!) 182/106 129/81  137/78   Pulse: 96 88 87 87   Resp: 21 11 17 18   Temp:       SpO2: 99% 97% 98% 97%   Weight:       Height:            Physical Exam:  General:  Older adult male lying in bed in no acute distress  HEENT:  Atraumatic, normocephalic; Pupils equally round and reactive to light with accommodation; Extraocular muscles intact; Moist Oropharynx without erythema, edema, or exudates  Chest:  No pectus carinatum; No pectus excavatum  Cardiovascular:  Regular rate and rhythm without rubs, gallops, or murmurs  Respiratory:  Clear to Auscultation Bilaterally without wheezes, rales, or rhonchi; normal effort of breathing  Abdominal:  Soft, non-tense, non-tender abdomen; BS present without guarding, rebound, or masses  :  Deferred  Extremities:  Pulses 2+ x4 without significant edema, clubbing, or cyanosis  Musculoskeletal:  Strength 5/5 and symmetrical in BUE and BLE  Integument:  No rash on face, forearms, or legs  Neurological:  Alert & Ostensibly Oriented x4/4; No gross deficits of Visual Acuity, Eye Movement, Jaw Opening, Facial Expression, Hearing, Phonation, or Head Movement;  No gross deficits of Tongue Movement or Slurring of Speech; No facial asymmetry  Psychiatric:  Affect is appropriate; Language is present and fluent; Behavior is appropriate      Laboratory Studies:  CMP:   Lab Results   Component Value Date/Time     10/30/2022 12:44 PM    K 3.7 10/30/2022 12:44 PM     10/30/2022 12:44 PM    CO2 28 10/30/2022 12:44 PM    AGAP 7 10/30/2022 12:44 PM     (H) 10/30/2022 12:44 PM    BUN 16 10/30/2022 12:44 PM    CREA 1.39 (H) 10/30/2022 12:44 PM    CA 8.6 10/30/2022 12:44 PM    MG 2.0 10/30/2022 12:44 PM    ALB 4.0 10/30/2022 12:44 PM    TP 8.3 (H) 10/30/2022 12:44 PM    GLOB 4.3 (H) 10/30/2022 12:44 PM    AGRAT 0.9 10/30/2022 12:44 PM    ALT 26 10/30/2022 12:44 PM     CBC:   Lab Results   Component Value Date/Time    WBC 5.0 10/30/2022 12:44 PM    HGB 16.0 10/30/2022 12:44 PM    HCT 46.0 10/30/2022 12:44 PM     10/30/2022 12:44 PM     All Cardiac Markers in the last 24 hours: No results found for: CPK, CK, CKMMB, CKMB, RCK3, CKMBT, CKNDX, CKND1, SUZI, TROPT, TROIQ, DONAVAN, TROPT, TNIPOC, BNP, BNPP  Recent Glucose Results:   Lab Results   Component Value Date/Time     (H) 10/30/2022 12:44 PM     COAGS: No results found for: APTT, PTP, INR, INREXT, INREXT     Images Reviewed:  CT HEAD WO CONT    Result Date: 10/30/2022  EXAM: CT HEAD WO CONT CLINICAL INDICATION/HISTORY : Concern for TIA. Syncopal episode COMPARISON: None TECHNIQUE: Helical axial scan was obtained from the skull base to the vertex without IV contrast administration. All CT scans at this facility are performed using dose optimization of technique as appropriate to a performed exam, to include automated exposure control, adjustment of the mA and/or kV according to patient size (including appropriate matching for site specific examinations), or use of iterative reconstruction technique. FINDINGS: The ventricles and sulci are normal in size, shape and position for patient's age.   Scattered low attenuation foci are identified along the periventricular and subcortical white matter, a nonspecific finding that is most commonly encountered in the setting of chronic microvascular disease. . Visualized portion of orbits and sinuses appear unremarkable. No hemorrhage identified. No mass lesion identified. No acute infarction identified. Typical posterior fossa artifacts     1. No evidence of acute intracranial process 2. Mild chronic small vessel ischemic disease     XR CHEST PORT    Result Date: 10/30/2022  EXAM: XR CHEST PORT CLINICAL INDICATION/HISTORY : syncope. COMPARISON: November 14, 2014 TECHNIQUE: 1 VIEWS FINDINGS: EKG leads and wires overlie the chest. The lungs are clear. Atherosclerotic vascular calcifications. No cardiomegaly. No evidence of pleural effusion. 1.  No active cardiopulmonary disease         Assessment:     Hospital Problems  Date Reviewed: 10/4/2022            Codes Class Noted POA    * (Principal) Syncope ICD-10-CM: R55  ICD-9-CM: 780.2  10/30/2022 Yes        Elevated troponin ICD-10-CM: R77.8  ICD-9-CM: 790.6  10/30/2022 Yes        SARS-CoV-2 positive ICD-10-CM: U07.1  ICD-9-CM: 079.89  10/30/2022 Yes        BPH (benign prostatic hyperplasia) (Chronic) ICD-10-CM: N40.0  ICD-9-CM: 600.00  10/30/2022 Yes        Hepatitis C (Chronic) ICD-10-CM: B19.20  ICD-9-CM: 070.70  11/14/2013 Yes        Hyperlipidemia (Chronic) ICD-10-CM: E78.5  ICD-9-CM: 272.4  9/30/2011 Yes        HTN (hypertension) (Chronic) ICD-10-CM: I10  ICD-9-CM: 401.9  9/30/2011 Yes           Plan:     TIA/CVA versus Syncope versus possible Dysrhythmia with Elevated Troponin  Telemetry, Bedside Swallow Screen (NPO if failed), Aspirin, Atorvastatin 80 mg, NeuroChecks q4 hrs, 24-hour Permissive Hypertensive Period (unless SBP >220 mm Hg or DBP >120 mm Hg, and then cover with PRN IV Labetalol 5 mg q10 minutes), MRI Brain and MRA Head & Neck, Echocardiogram in AM, and Consult Neurology. Check TSH, Hba1c, and Lipid Panel. Consult PT/OT/ST. Serial Troponins. Orthostatic Vitals qShift. Recommend EEG, given tongue-biting behavior. Elevated Troponin  - Troponin 207 ng/L to 211 ng/L  Trend Troponins. SARS-Cov-2+ Infection  Consult Infectious Disease services. Hypertension  HOLD home Amlodipine, Lisinopril, and HCTZ. Hyperlipidemia  HOLD home Statin therapy in lieu of High-Intensity Statin Therapy. Hepatitis C S/P Harvoni Treatment    BPH  No current home medications for this issue. DVT Prophylaxis  DVT chemoprophylaxis is achieved with subcutaneous Enoxaparin 40 mg qday.      Signed By: Colleen Mason DO     October 30, 2022

## 2022-10-31 ENCOUNTER — APPOINTMENT (OUTPATIENT)
Dept: NON INVASIVE DIAGNOSTICS | Age: 67
DRG: 312 | End: 2022-10-31
Attending: INTERNAL MEDICINE
Payer: COMMERCIAL

## 2022-10-31 LAB
ALBUMIN SERPL-MCNC: 3.4 G/DL (ref 3.4–5)
ALBUMIN/GLOB SERPL: 0.8 (ref 0.8–1.7)
ALP SERPL-CCNC: 79 U/L (ref 45–117)
ALT SERPL-CCNC: 24 U/L (ref 16–61)
ANION GAP SERPL CALC-SCNC: 5 MMOL/L (ref 3–18)
AST SERPL-CCNC: 21 U/L (ref 10–38)
BASOPHILS # BLD: 0 K/UL (ref 0–0.1)
BASOPHILS NFR BLD: 0 % (ref 0–2)
BILIRUB SERPL-MCNC: 0.3 MG/DL (ref 0.2–1)
BUN SERPL-MCNC: 17 MG/DL (ref 7–18)
BUN/CREAT SERPL: 14 (ref 12–20)
CALCIUM SERPL-MCNC: 8.9 MG/DL (ref 8.5–10.1)
CHLORIDE SERPL-SCNC: 103 MMOL/L (ref 100–111)
CHOLEST SERPL-MCNC: 134 MG/DL
CO2 SERPL-SCNC: 28 MMOL/L (ref 21–32)
CREAT SERPL-MCNC: 1.19 MG/DL (ref 0.6–1.3)
DIFFERENTIAL METHOD BLD: ABNORMAL
EOSINOPHIL # BLD: 0 K/UL (ref 0–0.4)
EOSINOPHIL NFR BLD: 0 % (ref 0–5)
ERYTHROCYTE [DISTWIDTH] IN BLOOD BY AUTOMATED COUNT: 12.6 % (ref 11.6–14.5)
EST. AVERAGE GLUCOSE BLD GHB EST-MCNC: 117 MG/DL
GLOBULIN SER CALC-MCNC: 4.2 G/DL (ref 2–4)
GLUCOSE SERPL-MCNC: 100 MG/DL (ref 74–99)
HBA1C MFR BLD: 5.7 % (ref 4.2–5.6)
HCT VFR BLD AUTO: 44.1 % (ref 36–48)
HDLC SERPL-MCNC: 32 MG/DL (ref 40–60)
HDLC SERPL: 4.2 (ref 0–5)
HGB BLD-MCNC: 15.2 G/DL (ref 13–16)
IMM GRANULOCYTES # BLD AUTO: 0 K/UL (ref 0–0.04)
IMM GRANULOCYTES NFR BLD AUTO: 0 % (ref 0–0.5)
INR PPP: 1 (ref 0.8–1.2)
LDLC SERPL CALC-MCNC: 78.2 MG/DL (ref 0–100)
LIPID PROFILE,FLP: ABNORMAL
LYMPHOCYTES # BLD: 2.1 K/UL (ref 0.9–3.6)
LYMPHOCYTES NFR BLD: 55 % (ref 21–52)
MCH RBC QN AUTO: 31 PG (ref 24–34)
MCHC RBC AUTO-ENTMCNC: 34.5 G/DL (ref 31–37)
MCV RBC AUTO: 89.8 FL (ref 78–100)
MONOCYTES # BLD: 0.5 K/UL (ref 0.05–1.2)
MONOCYTES NFR BLD: 13 % (ref 3–10)
NEUTS SEG # BLD: 1.2 K/UL (ref 1.8–8)
NEUTS SEG NFR BLD: 31 % (ref 40–73)
NRBC # BLD: 0 K/UL (ref 0–0.01)
NRBC BLD-RTO: 0 PER 100 WBC
PLATELET # BLD AUTO: 243 K/UL (ref 135–420)
PMV BLD AUTO: 9.8 FL (ref 9.2–11.8)
POTASSIUM SERPL-SCNC: 4.3 MMOL/L (ref 3.5–5.5)
PROT SERPL-MCNC: 7.6 G/DL (ref 6.4–8.2)
PROTHROMBIN TIME: 13.8 SEC (ref 11.5–15.2)
RBC # BLD AUTO: 4.91 M/UL (ref 4.35–5.65)
SODIUM SERPL-SCNC: 136 MMOL/L (ref 136–145)
TRIGL SERPL-MCNC: 119 MG/DL (ref ?–150)
TROPONIN-HIGH SENSITIVITY: 192 NG/L (ref 0–78)
TSH SERPL DL<=0.05 MIU/L-ACNC: 0.84 UIU/ML (ref 0.36–3.74)
VLDLC SERPL CALC-MCNC: 23.8 MG/DL
WBC # BLD AUTO: 3.8 K/UL (ref 4.6–13.2)

## 2022-10-31 PROCEDURE — 74011250637 HC RX REV CODE- 250/637: Performed by: FAMILY MEDICINE

## 2022-10-31 PROCEDURE — 80053 COMPREHEN METABOLIC PANEL: CPT

## 2022-10-31 PROCEDURE — 74011250637 HC RX REV CODE- 250/637: Performed by: INTERNAL MEDICINE

## 2022-10-31 PROCEDURE — 83036 HEMOGLOBIN GLYCOSYLATED A1C: CPT

## 2022-10-31 PROCEDURE — 74011250636 HC RX REV CODE- 250/636: Performed by: INTERNAL MEDICINE

## 2022-10-31 PROCEDURE — 84443 ASSAY THYROID STIM HORMONE: CPT

## 2022-10-31 PROCEDURE — 99232 SBSQ HOSP IP/OBS MODERATE 35: CPT | Performed by: FAMILY MEDICINE

## 2022-10-31 PROCEDURE — 80061 LIPID PANEL: CPT

## 2022-10-31 PROCEDURE — 65270000046 HC RM TELEMETRY

## 2022-10-31 PROCEDURE — 97166 OT EVAL MOD COMPLEX 45 MIN: CPT

## 2022-10-31 PROCEDURE — 36415 COLL VENOUS BLD VENIPUNCTURE: CPT

## 2022-10-31 PROCEDURE — 74011000250 HC RX REV CODE- 250: Performed by: INTERNAL MEDICINE

## 2022-10-31 PROCEDURE — 97161 PT EVAL LOW COMPLEX 20 MIN: CPT

## 2022-10-31 PROCEDURE — 96372 THER/PROPH/DIAG INJ SC/IM: CPT

## 2022-10-31 PROCEDURE — 85610 PROTHROMBIN TIME: CPT

## 2022-10-31 PROCEDURE — 85025 COMPLETE CBC W/AUTO DIFF WBC: CPT

## 2022-10-31 PROCEDURE — 84484 ASSAY OF TROPONIN QUANT: CPT

## 2022-10-31 PROCEDURE — G0378 HOSPITAL OBSERVATION PER HR: HCPCS

## 2022-10-31 RX ORDER — AMLODIPINE BESYLATE 10 MG/1
10 TABLET ORAL DAILY
Status: DISCONTINUED | OUTPATIENT
Start: 2022-10-31 | End: 2022-10-31

## 2022-10-31 RX ORDER — HYDROCHLOROTHIAZIDE 25 MG/1
25 TABLET ORAL DAILY
Status: DISCONTINUED | OUTPATIENT
Start: 2022-10-31 | End: 2022-11-02 | Stop reason: HOSPADM

## 2022-10-31 RX ORDER — LISINOPRIL 40 MG/1
40 TABLET ORAL DAILY
Status: DISCONTINUED | OUTPATIENT
Start: 2022-10-31 | End: 2022-11-02 | Stop reason: HOSPADM

## 2022-10-31 RX ORDER — AMLODIPINE BESYLATE 10 MG/1
10 TABLET ORAL
Status: DISCONTINUED | OUTPATIENT
Start: 2022-11-01 | End: 2022-11-02 | Stop reason: HOSPADM

## 2022-10-31 RX ORDER — ATORVASTATIN CALCIUM 20 MG/1
20 TABLET, FILM COATED ORAL
Status: DISCONTINUED | OUTPATIENT
Start: 2022-10-31 | End: 2022-11-02 | Stop reason: HOSPADM

## 2022-10-31 RX ADMIN — LISINOPRIL 40 MG: 40 TABLET ORAL at 16:28

## 2022-10-31 RX ADMIN — SODIUM CHLORIDE, PRESERVATIVE FREE 10 ML: 5 INJECTION INTRAVENOUS at 21:11

## 2022-10-31 RX ADMIN — ATORVASTATIN CALCIUM 20 MG: 20 TABLET, FILM COATED ORAL at 21:11

## 2022-10-31 RX ADMIN — HYDROCHLOROTHIAZIDE 25 MG: 25 TABLET ORAL at 16:28

## 2022-10-31 RX ADMIN — ENOXAPARIN SODIUM 40 MG: 100 INJECTION SUBCUTANEOUS at 16:28

## 2022-10-31 RX ADMIN — SODIUM CHLORIDE, PRESERVATIVE FREE 10 ML: 5 INJECTION INTRAVENOUS at 07:12

## 2022-10-31 RX ADMIN — THERA TABS 1 TABLET: TAB at 09:33

## 2022-10-31 RX ADMIN — ASPIRIN 81 MG: 81 TABLET, COATED ORAL at 09:33

## 2022-10-31 NOTE — CONSULTS
[facility]  (A Division of [address] [place] [address])      Consultation Note      Date of Admission: 10/30/2022    Date of Note: 10/31/2022      Reason for Referral: Sugey Britton  Referring Physician: Dr. Shayy Priest from this admission:   10/30 respiratory viral panel: Positive for SARS-CoV-2    Current Antimicrobials:    Prior Antimicrobials:  None        Assessment:         Asymptomatic COVID-19 infection in unvaccinated patient-no associated fevers, patient is not hypoxic remains on room air. Chest x-ray without any acute cardiovascular changes  Rule out TIA: In progress including MRI MR a of brain and neck-no acute ischemic process or hemorrhages noted  Elevated troponin: Trending down since presentation  Mild LELAND  History of hep C-viral load undetectable  Hypertension currently controlled  Dyslipidemia: Controlled    Plan:   Encourage fluid resuscitation  No supplemental oxygen currently required; maintain sats>92  Aspiration precautions, Incentive spirometry    CBC, CMP daily  No current indication for steroids as he is not hypoxic and relatively asymptomatic  Remdesivir: Not indicated  Baricitinib: Not indicated  Tocilizumib: Not indicated    Lovenox 40 mg IV daily for DVT prophylaxis   SSI and Accuchecks for tight glucose control. Consider ancillary COVID-19 supportive therapies: Vit C, Vit D, zinc, melatonin- no proven significant benefit, however do not cause harm    Okay to discharge from infectious disease standpoint.   Given age and comorbidities could consider Paxlovid dosepak 200/100 twice daily x5 days upon discharge (must be given within first 5 days)      Kelechi Smith, [facility]  [address] \[facility]\"", [address]  Office: [phone], Ext [phone]  Mobile/Text: [phone]    Lines / Catheters:  Peripheral    HPI:  Mr. Marcus Kohli is a 61-year-old gentleman with a history of BPH, hypertension, hepatitis C with an undetectable viral load and dyslipidemia who presented to the emergency department after having an unresponsive episode. He had been otherwise feeling well until he sat down on the toilet when he had about 3 bowel movements and that he became dizzy and diaphoretic. That point his wife had called EMS and it was noted per the report that he had vomit on his clothing. He had had a similar event about 6 months ago during a colonoscopy prep. He is currently back to his baseline mentation. No fevers or chills. He is not sure what had happened but overall is feeling better. He has been afebrile since presentation. Blood pressure in the ED was 121/71. O2 sats are 96% and he remains on room air. WBCs initially 5.0. Creatinine 1.39 troponin 207. Respiratory viral panel was positive for COVID. Patient states that he is not vaccinated for COVID due to concern for allergy.   Unaware of any sick contacts now or in the past.         Past Medical History:   Diagnosis Date    BPH (benign prostatic hypertrophy) 10/15/2012    ED (erectile dysfunction) 9/30/2011    Hepatitis C     HTN (hypertension) 9/30/2011    Hyperlipidemia 9/30/2011     Past Surgical History:   Procedure Laterality Date    HX ACL RECONSTRUCTION      HX COLONOSCOPY       Family History   Problem Relation Age of Onset    Hypertension Father     Alcohol abuse Father     Hypertension Mother     Hypertension Brother     Hypertension Brother      Medications reviewed as below:   Current Facility-Administered Medications   Medication Dose Route Frequency Provider Last Rate Last Admin    atorvastatin (LIPITOR) tablet 80 mg  80 mg Oral QHS aVhid Tavarez DO   80 mg at 10/30/22 2212    labetaloL (NORMODYNE;TRANDATE) 20 mg/4 mL (5 mg/mL) injection 5 mg  5 mg IntraVENous Q10MIN PRN Vahid Tavarez DO        enoxaparin (LOVENOX) injection 40 mg  40 mg SubCUTAneous Q24H Vahid Tavarez DO   40 mg at 10/30/22 1806    sodium chloride (NS) flush 5-40 mL  5-40 mL IntraVENous Q8H Mamadou Amarillo, DO   10 mL at 10/31/22 5971    sodium chloride (NS) flush 5-40 mL  5-40 mL IntraVENous PRN Mamadou Amarillo, DO        acetaminophen (TYLENOL) tablet 650 mg  650 mg Oral Q6H PRN Mamadou Amarillo, DO        Or    acetaminophen (TYLENOL) suppository 650 mg  650 mg Rectal Q6H PRN Vahid Tavarez, DO        ondansetron (ZOFRAN ODT) tablet 4 mg  4 mg Oral Q8H PRN Vahid Tavarez,         Or    ondansetron (ZOFRAN) injection 4 mg  4 mg IntraVENous Q6H PRN Vahid Tavarez, DO        melatonin tablet 5 mg  5 mg Oral QHS PRN Vahid Tavarez, DO        albuterol-ipratropium (DUO-NEB) 2.5 MG-0.5 MG/3 ML  3 mL Nebulization Q6H PRN Vahid Tavarez,         aspirin delayed-release tablet 81 mg  81 mg Oral DAILY Mamadou Amarillo, DO   81 mg at 10/30/22 1901    therapeutic multivitamin (THERAGRAN) tablet 1 Tablet  1 Tablet Oral DAILY Vahid Tavarez, DO         Allergies   Allergen Reactions    Sean [Amlodipine-Olmesartan] Palpitations and Other (comments)     Fatigue, headache, pt states \"kidneys hurt\" (tolerates amlodipine alone)    Atenolol Rash    Miralax [Polyethylene Glycol 3350] Swelling     Social History     Socioeconomic History    Marital status:      Spouse name: Not on file    Number of children: Not on file    Years of education: Not on file    Highest education level: Not on file   Occupational History    Not on file   Tobacco Use    Smoking status: Former    Smokeless tobacco: Never   Vaping Use    Vaping Use: Not on file   Substance and Sexual Activity    Alcohol use:  Yes     Alcohol/week: 1.0 standard drink     Types: 1 Cans of beer per week    Drug use: No    Sexual activity: Yes   Other Topics Concern    Not on file   Social History Narrative    Not on file     Social Determinants of Health     Financial Resource Strain: Not on file   Food Insecurity: Not on file   Transportation Needs: Not on file   Physical Activity: Not on file   Stress: Not on file   Social Connections: Not on file   Intimate Partner Violence: Not on file   Housing Stability: Not on file        Review of Systems    Negative Unless BOLDED    General: fevers, chills, myalgias, arthralgias, unexplained weight loss, malaise, fatigue. HEENT:  headaches,sinus pain or presure, recent URI, recent dental procedures;  tinnitus, hearing loss, catarats, dizziness   PUlMONARY:  cough , shortness of breath, sputum production, hx of asthma or COPD. previous treatement for TB or PPD. Cardiovascular: chest pain, previous CAD/MI, hx valvular heart disease,  murmurs, peripheral edema, weight gain  GI:   nausea, vomiting, diarrhea, abdominal pain, prior C.diff, heartburn, melena, hematochezia  :  urinary frequency, dysuria, hematuria, bladder incontinence, flank pain   Neurologic:  seizures, syncope, prior CVA/TIA, confusion, memory impairment, neuropathy, weakness of extremities, difficulty speaking/word finding, headache, vision changes  Musculoskeletal:  myalgias, arthralgias, joint pain, joint swelling, back pain  Skin:  Pruritis, rash, chronic stasis changes, diabetic foot ulcers, lymphedema, pressure wounds  Endocrine: polyuria, polydipsia, hair loss  Psych: anxiousness, prior substance abuse, suicidal ideation, depressed mood. Heme-Onc: prior DVT, easy bruising, fatigue, malignancy        Objective:      Visit Vitals  BP (!) 143/83 (BP 1 Location: Left upper arm, BP Patient Position: At rest)   Pulse 83   Temp 98 °F (36.7 °C)   Resp 20   Ht 5' 7\" (1.702 m)   Wt 72.6 kg (160 lb)   SpO2 98%   BMI 25.06 kg/m²     Temp (24hrs), Av.1 °F (36.7 °C), Min:98 °F (36.7 °C), Max:98.3 °F (36.8 °C)        General:   awake alert and oriented   Skin:   no rashes or skin lesions noted on limited exam   HEENT:  Normocephalic, atraumatic, PERRL, EOMI, no scleral icterus or pallor; no conjunctival hemmohage;  nasal and oral mucous are moist and without evidence of lesions. No thrush. Dentition good. Neck supple, no bruits. Lymph Nodes:   no cervical, axillary or inguinal adenopathy   Lungs:   non-labored, bilaterally clear to aspiration- no crackles wheezes rales or rhonchi   Heart:  RRR, s1 and s2; no murmurs rubs or gallops, no edema, + pedal pulses   Abdomen:  soft, non-distended, active bowel sounds, no hepatomegaly, no splenomegaly. Appropriate surgical scars for stated surgeries. Non-tender   Genitourinary:  deferred   Extremities:   no clubbing, cyanosis; no joint effusions or swelling; Full ROM of all large joints to the upper and lower extremities; muscle mass appropriate for age   Neurologic:  No gross focal sensory abnormalities; 5/5 muscle strength to upper and lower extremities. Speech appropirate. Cranial nerves intact   Psychiatric:   appropriate and interactive.        Labs: Results:   Chemistry Recent Labs     10/31/22  0424 10/30/22  1244   * 133*    138   K 4.3 3.7    103   CO2 28 28   BUN 17 16   CREA 1.19 1.39*   CA 8.9 8.6   AGAP 5 7   BUCR 14 12   AP 79 79   TP 7.6 8.3*   ALB 3.4 4.0   GLOB 4.2* 4.3*   AGRAT 0.8 0.9      CBC w/Diff Recent Labs     10/31/22  0424 10/30/22  1244   WBC 3.8* 5.0   RBC 4.91 5.15   HGB 15.2 16.0   HCT 44.1 46.0    257   GRANS 31* 43   LYMPH 55* 45   EOS 0 0            No results found for: SDES No results found for: CULT     Results       Procedure Component Value Units Date/Time    COVID-19 WITH INFLUENZA A/B [[id number]] Collected: 10/30/22 1548    Order Status: Canceled Specimen: Nasopharyngeal     RESPIRATORY VIRUS PANEL W/COVID-19, PCR [[id number]]  (Abnormal) Collected: 10/30/22 1548    Order Status: Completed Specimen: Nasopharyngeal Updated: 10/30/22 9957     Adenovirus Not detected        Coronavirus 229E Not detected        Coronavirus HKU1 Not detected        Coronavirus CVNL63 Not detected        Coronavirus OC43 Not detected        SARS-CoV-2, PCR Detected        Comment: CALLED TO AND CORRECTLY REPEATED BY:  Vishal Pierre RN ED AT [phone] TO Arnaldo De Oliveira Not detected        Rhinovirus and Enterovirus Not detected        Influenza A Not detected        Influenza A, subtype H1 Not detected        Influenza A, subtype H3 Not detected        INFLUENZA A H1N1 PCR Not detected        Influenza B Not detected        Parainfluenza 1 Not detected        Parainfluenza 2 Not detected        Parainfluenza 3 Not detected        Parainfluenza virus 4 Not detected        RSV by PCR Not detected        B. parapertussis, PCR Not detected        Bordetella pertussis - PCR Not detected        Chlamydophila pneumoniae DNA, QL, PCR Not detected        Mycoplasma pneumoniae DNA, QL, PCR Not detected       RSV AG - RAPID [[id number]] Collected: 10/30/22 1545    Order Status: Canceled               Imaging:      All imaging reviewed from Admission to present as per radiology interpretation in [address] [place] [address]

## 2022-10-31 NOTE — PROGRESS NOTES
Problem: Mobility Impaired (Adult and Pediatric)  Goal: *Acute Goals and Plan of Care (Insert Text)  Outcome: Resolved/Met   PHYSICAL THERAPY EVALUATION AND DISCHARGE    Patient: Clau Stewart (74 y.o. male)  Date: 10/31/2022  Primary Diagnosis: Syncope [R55]  Elevated troponin [R77.8]       Precautions:   (droplet plus)  PLOF: Pt independent works as a . Lives with wife in 1 story house with 4 MILAGROS with B handrails. ASSESSMENT :  Based on the objective data described below, the patient presents with baseline functional mobility. Pt independent in bed mobility, transfers and ambulation. Pt reporting has been toileting independently without AD. Pt demonstrating 5/5 LE strength. Ambulating x30 feet with appropriate mobility and no AD. Pt with no questions or concerns regarding mobility. Will sign off. Patient does not require further skilled intervention at this level of care. PLAN :  Recommendations and Planned Interventions:   No formal PT needs identified at this time. Further Equipment Recommendations for Discharge: N/A    AMPAC: At this time and based on an AM-PAC score of 24/24, no further PT is recommended upon discharge due to patient at baseline functional status. Recommend patient returns to prior setting with prior services. This AMPAC score should be considered in conjunction with interdisciplinary team recommendations to determine the most appropriate discharge setting. Patient's social support, diagnosis, medical stability, and prior level of function should also be taken into consideration. SUBJECTIVE:   Patient stated I feel good.     OBJECTIVE DATA SUMMARY:     Past Medical History:   Diagnosis Date    BPH (benign prostatic hypertrophy) 10/15/2012    ED (erectile dysfunction) 9/30/2011    Hepatitis C     HTN (hypertension) 9/30/2011    Hyperlipidemia 9/30/2011     Past Surgical History:   Procedure Laterality Date    HX ACL RECONSTRUCTION      HX COLONOSCOPY Barriers to Learning/Limitations: None  Compensate with: N/A  Home Situation:   Home Situation  Home Environment: Private residence  # Steps to Enter: 4  Rails to Enter: Yes  Hand Rails : Bilateral  One/Two Story Residence: One story  Living Alone: No  Support Systems: Spouse/Significant Other  Patient Expects to be Discharged to[de-identified] Home  Current DME Used/Available at Home: None  Tub or Shower Type: Tub/Shower combination  Critical Behavior:  Neurologic State: Alert  Orientation Level: Oriented X4  Cognition: Follows commands  Safety/Judgement: Fall prevention  Psychosocial  Patient Behaviors: Calm; Cooperative    Strength:    Strength: Within functional limits    Tone & Sensation:   Tone: Normal    Sensation: Intact    Range Of Motion:  AROM: Within functional limits    Posture:  Posture (WDL): Within defined limits      Functional Mobility:  Bed Mobility:     Supine to Sit: Independent  Sit to Supine: Independent  Scooting: Independent  Transfers:  Sit to Stand: Independent  Stand to Sit: Independent    Balance:   Sitting: Intact  Standing: Intact    Ambulation/Gait Training:    Gait Description (WDL): Within defined limits    Pain:  Pain level pre-treatment: 0/10   Pain level post-treatment: 0/10    Activity Tolerance:   Good     Please refer to the flowsheet for vital signs taken during this treatment. After treatment:   []         Patient left in no apparent distress sitting up in chair  [x]         Patient left in no apparent distress in bed  [x]         Call bell left within reach  [x]         Nursing notified  []         Caregiver present  []         Bed alarm activated  []         SCDs applied    COMMUNICATION/EDUCATION:   [x]         Role of Physical Therapy in the acute care setting. [x]         Fall prevention education was provided and the patient/caregiver indicated understanding. [x]         Patient/family have participated as able in goal setting and plan of care.   [x]         Patient/family agree to work toward stated goals and plan of care. []         Patient understands intent and goals of therapy, but is neutral about his/her participation. []         Patient is unable to participate in goal setting/plan of care: ongoing with therapy staff.  []         Other: Thank you for this referral.  Basilio Gallegos, PT   Time Calculation: 10 mins      Eval Complexity: History: MEDIUM  Complexity : 1-2 comorbidities / personal factors will impact the outcome/ POC Exam:LOW Complexity : 1-2 Standardized tests and measures addressing body structure, function, activity limitation and / or participation in recreation  Presentation: LOW Complexity : Stable, uncomplicated  Clinical Decision Making:Low Complexity low  Overall Complexity:LOW     [facility] AM-PAC® Basic Mobility Inpatient Short Form (6-Clicks) Version 2    How much HELP from another person does the patient currently need    (If the patient hasn't done an activity recently, how much help from another person do you think he/she would need if he/she tried?)   Total (Total A or Dep)   A Lot  (Mod to Max A)   A Little (Sup or Min A)   None (Mod I to I)   Turning from your back to your side while in a flat bed without using bedrails? [] 1 [] 2 [] 3 [x] 4   2. Moving from lying on your back to sitting on the side of a flat bed without using bedrails? [] 1 [] 2 [] 3 [x] 4   3. Moving to and from a bed to a chair (including a wheelchair)? [] 1 [] 2 [] 3 [x] 4   4. Standing up from a chair using your arms (e.g., wheelchair, or bedside chair)? [] 1 [] 2 [] 3 [x] 4   5. Walking in hospital room? [] 1 [] 2 [] 3 [x] 4   6. Climbing 3-5 steps with a railing?+   [] 1 [] 2 [] 3 [x] 4   +If stair climbing cannot be assessed, skip item #6. Sum responses from items 1-5.

## 2022-10-31 NOTE — ROUTINE PROCESS
Bedside and Verbal shift change report given to AURELIO Lund RN by Latasha Abraham RN. Report included the following information SBAR, Kardex, Intake/Output, and MAR.

## 2022-10-31 NOTE — PROGRESS NOTES
SLP Note:    SLP evaluation orders received. Upon chart review and discussion with pt, no skilled SLP evaluation indicated at this time. Pt tolerating regular diet with thin liquids with no reported distress. Speech and voice within functional limits. Will complete order.          Thank you for this referral,   Asia Camacho M.S., [id number] [facility]  Speech-Language Pathologist

## 2022-10-31 NOTE — PROGRESS NOTES
Problem: Self Care Deficits Care Plan (Adult)  Goal: *Acute Goals and Plan of Care (Insert Text)  Outcome: Resolved/Met   OCCUPATIONAL THERAPY EVALUATION/DISCHARGE    Patient: Shama Kan (89 y.o. male)  Date: 10/31/2022  Primary Diagnosis: Syncope [R55]  Elevated troponin [R77.8]       Precautions:   Contact (droplet+)  PLOF: independent with ADLs and functional mobility, driving    ASSESSMENT AND RECOMMENDATIONS:  Nursing/RN cleared for pt to participate in OT evaluation and tx session. Patient presents sitting up in recliner chair, A & O x 4, no c/o pain. LB dress: independent doff and don slipper sock using crossing leg method. Toilet transfer: independent and good balance, independent washing hands in stance at sink. Patient sitting up in recliner chair at end of tx session. Patient verbalized understanding to utilize call bell to request assist as needed. Nursing notified of pt's level of assist with toilet transfer. Patient presents at baseline as PLOF with ADLs and functional mobility. Patient verbalized understanding of skilled OT services not indicated at this time while in acute hospital.     Further Equipment Recommendations for Discharge: shower chair for energy conservation    AMPAC: At this time and based on an AM-PAC score of 24/24, no further OT is recommended upon discharge due to (i.e. patient at baseline functional statusetc). Recommend patient returns to prior setting with prior services. This AMPAC score should be considered in conjunction with interdisciplinary team recommendations to determine the most appropriate discharge setting. Patient's social support, diagnosis, medical stability, and prior level of function should also be taken into consideration. SUBJECTIVE:   Patient stated I have been walking to the bathroom.     OBJECTIVE DATA SUMMARY:     Past Medical History:   Diagnosis Date    BPH (benign prostatic hypertrophy) 10/15/2012    ED (erectile dysfunction) 9/30/2011    Hepatitis C     HTN (hypertension) 9/30/2011    Hyperlipidemia 9/30/2011     Past Surgical History:   Procedure Laterality Date    HX ACL RECONSTRUCTION      HX COLONOSCOPY       Barriers to Learning/Limitations: None  Compensate with: visual, verbal, tactile, kinesthetic cues/model    Home Situation:   Home Situation  Home Environment: Private residence  # Steps to Enter:  (0)  One/Two Story Residence: One story  Living Alone: No  Support Systems: Spouse/Significant Other  Patient Expects to be Discharged to[de-identified] Home  Current DME Used/Available at Home: None  Tub or Shower Type: Tub/Shower combination  []     Right hand dominant   []     Left hand dominant    Cognitive/Behavioral Status:  Neurologic State: Alert  Orientation Level: Oriented X4  Cognition: Follows commands  Safety/Judgement: Fall prevention    Skin: appears intact  Edema: none noted    Vision/Perceptual:  appears intact    Coordination: BUE  Coordination: Within functional limits  Fine Motor Skills-Upper: Left Intact; Right Intact    Gross Motor Skills-Upper: Left Intact; Right Intact    Balance:  Sitting: Intact  Standing: Intact; Without support    Strength: BUE  Strength: Within functional limits     Tone & Sensation: BUE  Tone: Normal  Sensation: Intact     Range of Motion: BUE  AROM: Within functional limits     Functional Mobility and Transfers for ADLs:  Bed Mobility:    Transfers:  Sit to Stand: Independent  Stand to Sit: Independent     Toilet Transfer : Independent      Bathroom Mobility: Independent    ADL Assessment:  Feeding: Independent    Oral Facial Hygiene/Grooming: Independent    Bathing: Independent    Upper Body Dressing: Independent    Lower Body Dressing: Independent    Toileting: Independent     ADL Intervention:  Grooming  Position Performed: Standing  Washing Hands: Independent    Lower Body Dressing Assistance  Socks:  Independent  Leg Crossed Method Used: Yes  Position Performed: Seated in chair    Cognitive Retraining  Safety/Judgement: Fall prevention    Pain:  Pain level pre-treatment: 0/10   Pain level post-treatment: 0/10     Activity Tolerance:   good  Please refer to the flowsheet for vital signs taken during this treatment. After treatment:   [x]  Patient left in no apparent distress sitting up in chair  []  Patient left in no apparent distress in bed  [x]  Call bell left within reach  [x]  Nursing notified  []  Caregiver present  []  Bed alarm activated    COMMUNICATION/EDUCATION:   [x]      Role of Occupational Therapy in the acute care setting  [x]      Home safety education was provided and the patient/caregiver indicated understanding. [x]      Patient/family have participated as able and agree with findings and recommendations. []      Patient is unable to participate in plan of care at this time. Thank you for this referral.  Patt Singleton  Time Calculation: 10 mins      Eval Complexity: History: MEDIUM Complexity : Expanded review of history including physical, cognitive and psychosocial  history ; Examination: MEDIUM Complexity : 3-5 performance deficits relating to physical, cognitive , or psychosocial skils that result in activity limitations and / or participation restrictions; Decision Making:MEDIUM Complexity : Patient may present with comorbidities that affect occupational performnce. Miniml to moderate modification of tasks or assistance (eg, physical or verbal ) with assesment(s) is necessary to enable patient to complete evaluation     [facility]-PAC® Daily Activity Inpatient Short Form (6-Clicks)*    How much HELP from another person does the patient currently need    (If the patient hasn't done an activity recently, how much help from another person do you think he/she would need if he/she tried?)   Total (Total A or Dep)   A Lot  (Mod to Max A)   A Little (Sup or Min A)   None (Mod I to I)   Putting on and taking off regular lower body clothing?    [] 1 [] 2 [] 3 [x] 4   2. Bathing (including washing, rinsing,      drying)? [] 1 [] 2 [] 3 [x] 4   3. Toileting, which includes using toilet, bedpan or urinal?   [] 1 [] 2 [] 3 [x] 4   4. Putting on and taking off regular upper body clothing? [] 1 [] 2 [] 3 [x] 4   5. Taking care of personal grooming such as brushing teeth? [] 1 [] 2 [] 3 [x] 4   6. Eating meals?    [] 1 [] 2 [] 3 [x] 4

## 2022-10-31 NOTE — REHAB NOTE
ARU/IPR REFERRAL CONTACT NOTE  [id number] [place] [address] for Physical Rehabilitation      Thank you for the opportunity to review this patient's case for admission to [address] [facility] for Physical Rehabilitation. Based on our pre-admission screening:     [x ] This patient does not meet criteria for admission to [facility] for Physical Rehabilitation due to:    [ x] Too functional, per documentation, patient does not require both Physical and Occupational Therapy Services at an acute rehabilitation level of intensity. Pt was evaluated and discharged from OT. Again, Thank you for this referral. Should you have any questions please do not hesitate to call.      Sincerely,  Kan Man Liaison  [facility]  [phone]

## 2022-10-31 NOTE — PROGRESS NOTES
Reason for Admission:  Syncope [R55]  Elevated troponin [R77.8]                 RUR Score:    5            Plan for utilizing home health:    No                      Likelihood of Readmission:   LOW                         Transition of Care Plan:              Initial assessment completed with patient. Cognitive status of patient: oriented to time, place, person and situation at time of assessment. Face sheet information confirmed:  yes. The patient designates wife True Jerry to participate in his discharge plan and to receive any needed information. This patient lives in a single family home with wife. The patient lives in a 1 level ranch. There are 4 steps to enter from the front and a deck on the back porch. Patient is able to navigate steps as needed. Prior to hospitalization, patient was considered to be independent with ADLs/IADLS : yes . Patient has a current ACP document on file: no      Healthcare Decision Maker:   Primary Decision Maker (Active): Vikram Diaz - Spouse - [phone]    Click here to complete [address] including selection of the Healthcare Decision Maker Relationship (ie \"Primary\")    The wife will be available to transport patient home upon discharge. The patient does not have any medical equipment available in the home. Patient is not currently active with home health. Patient has not stayed in a skilled nursing facility or rehab. Was  stay within last 60 days : no. This patient is on dialysis :no    Currently, the discharge plan is Home. The patient states that he can obtain his medications from the pharmacy, and take his medications as directed. Patient's current insurance is Medicare A/[place]. The patient has not been vaccinated for covid. The patient currently works and will be returning to work once medically stable.     The patient reported that he was in the [employer] in 1972 and does not [facility].    Care Management Interventions  PCP Verified by CM: Yes  Mode of Transport at Discharge:  Other (see comment) (family)  Transition of Care Consult (CM Consult): Discharge Planning  Discharge Durable Medical Equipment: No  Support Systems: Spouse/Significant Other  Confirm Follow Up Transport: Family  Discharge Location  Patient Expects to be Discharged to[de-identified] Home        ROSS Oliva

## 2022-10-31 NOTE — PROGRESS NOTES
[facility]    Patient: Chalino Husbands Age: 69 y.o. : 1955 MR#: [id number] SSN: [id number]  Date: 10/31/2022     Subjective/24-hour events:     Feels well currently. Specifically denies chest pain, shortness of breath, dizziness/lightheadedness. Assessment:   Syncope  COVID-19 infection, asymptomatic  Elevated troponin  Hypertension  Hyperlipidemia  History of hepatitis C status posttreatment  BPH    Plan:   Symptomatic treatment for COVID-19. Patient without any hypoxia or acute respiratory symptoms. ID evaluation/recommendations appreciated. Stroke work-up negative. Suspect that this may have been an episode of transient hypotension versus vasovagal event as patient apparently was on toilet when he went unresponsive per discussion with wife. Blood pressures have been trending upward and, now that CVA has been ruled out, will resume home medications gradually and monitor. Await 2D echo. Troponin elevated but flat, no cardiac symptoms reported. Further cardiac work-up not likely to be necessary. Continue IV fluids for hydration. Orthostatic blood pressures if these have not been obtained as of yet. If stable overnight and no new issues, suspect the patient will likely be able to be discharged home with outpatient follow-up. Plan discussed with patient and with wife, questions answered. Case discussed with:  [x]Patient  []Family  [x]Nursing  [x]Case Management  DVT Prophylaxis:  [x]Lovenox  []Hep SQ  []SCDs  []Coumadin   []On Heparin gtt    Objective:   VS: Visit Vitals  BP (!) 142/87   Pulse 94   Temp 97.9 °F (36.6 °C)   Resp 17   Ht 5' 7\" (1.702 m)   Wt 72.6 kg (160 lb)   SpO2 99%   BMI 25.06 kg/m²      Tmax/24hrs: Temp (24hrs), Av.1 °F (36.7 °C), Min:97.9 °F (36.6 °C), Max:98.3 °F (36.8 °C)  No intake or output data in the 24 hours ending 10/31/22 0766    General:  In NAD. Nontoxic-appearing. Cardiovascular:  RRR. Pulmonary:  Lungs clear bilaterally, no wheezes. No accessory muscle use. GI:  Abdomen soft, NTTP. Extremities:  Warm, no edema or ischemia. Neuro:  Awake and alert.   Moves extremities spontaneously, motor grossly nonfocal.    Labs:    Recent Results (from the past 24 hour(s))   URINALYSIS W/ RFLX MICROSCOPIC    Collection Time: 10/30/22  3:48 PM   Result Value Ref Range    Color YELLOW      Appearance CLEAR      Specific gravity 1.019 1.005 - 1.030      pH (UA) 7.0 5.0 - 8.0      Protein Negative NEG mg/dL    Glucose Negative NEG mg/dL    Ketone TRACE (A) NEG mg/dL    Bilirubin Negative NEG      Blood Negative NEG      Urobilinogen 1.0 0.2 - 1.0 EU/dL    Nitrites Negative NEG      Leukocyte Esterase Negative NEG     DRUG SCREEN, URINE    Collection Time: 10/30/22  3:48 PM   Result Value Ref Range    BENZODIAZEPINES Negative NEG      BARBITURATES Negative NEG      THC (TH-CANNABINOL) Negative NEG      OPIATES Negative NEG      PCP(PHENCYCLIDINE) Negative NEG      COCAINE Negative NEG      AMPHETAMINES Negative NEG      METHADONE Negative NEG      HDSCOM (NOTE)    RESPIRATORY VIRUS PANEL W/COVID-19, PCR    Collection Time: 10/30/22  3:48 PM    Specimen: Nasopharyngeal   Result Value Ref Range    Adenovirus Not detected NOTD      Coronavirus 229E Not detected NOTD      Coronavirus HKU1 Not detected NOTD      Coronavirus CVNL63 Not detected NOTD      Coronavirus OC43 Not detected NOTD      SARS-CoV-2, PCR Detected (AA) NOTD      Metapneumovirus Not detected NOTD      Rhinovirus and Enterovirus Not detected NOTD      Influenza A Not detected NOTD      Influenza A, subtype H1 Not detected NOTD      Influenza A, subtype H3 Not detected NOTD      INFLUENZA A H1N1 PCR Not detected NOTD      Influenza B Not detected NOTD      Parainfluenza 1 Not detected NOTD      Parainfluenza 2 Not detected NOTD      Parainfluenza 3 Not detected NOTD      Parainfluenza virus 4 Not detected NOTD      RSV by PCR Not detected NOTD B. parapertussis, PCR Not detected NOTD      Bordetella pertussis - PCR Not detected NOTD      Chlamydophila pneumoniae DNA, QL, PCR Not detected NOTD      Mycoplasma pneumoniae DNA, QL, PCR Not detected NOTD     TROPONIN-HIGH SENSITIVITY    Collection Time: 10/30/22  4:11 PM   Result Value Ref Range    Troponin-High Sensitivity 211 (HH) 0 - 78 ng/L   TROPONIN-HIGH SENSITIVITY    Collection Time: 10/30/22 10:30 PM   Result Value Ref Range    Troponin-High Sensitivity 202 (HH) 0 - 78 ng/L   LIPID PANEL    Collection Time: 10/31/22  4:24 AM   Result Value Ref Range    LIPID PROFILE          Cholesterol, total 134 <200 MG/DL    Triglyceride 119 <150 MG/DL    HDL Cholesterol 32 (L) 40 - 60 MG/DL    LDL, calculated 78.2 0 - 100 MG/DL    VLDL, calculated 23.8 MG/DL    CHOL/HDL Ratio 4.2 0 - 5.0     HEMOGLOBIN A1C WITH EAG    Collection Time: 10/31/22  4:24 AM   Result Value Ref Range    Hemoglobin A1c 5.7 (H) 4.2 - 5.6 %    Est. average glucose 117 mg/dL   TSH 3RD GENERATION    Collection Time: 10/31/22  4:24 AM   Result Value Ref Range    TSH 0.84 0.36 - 6.03 uIU/mL   METABOLIC PANEL, COMPREHENSIVE    Collection Time: 10/31/22  4:24 AM   Result Value Ref Range    Sodium 136 136 - 145 mmol/L    Potassium 4.3 3.5 - 5.5 mmol/L    Chloride 103 100 - 111 mmol/L    CO2 28 21 - 32 mmol/L    Anion gap 5 3.0 - 18 mmol/L    Glucose 100 (H) 74 - 99 mg/dL    BUN 17 7.0 - 18 MG/DL    Creatinine 1.19 0.6 - 1.3 MG/DL    BUN/Creatinine ratio 14 12 - 20      eGFR >60 >60 ml/min/1.73m2    Calcium 8.9 8.5 - 10.1 MG/DL    Bilirubin, total 0.3 0.2 - 1.0 MG/DL    ALT (SGPT) 24 16 - 61 U/L    AST (SGOT) 21 10 - 38 U/L    Alk.  phosphatase 79 45 - 117 U/L    Protein, total 7.6 6.4 - 8.2 g/dL    Albumin 3.4 3.4 - 5.0 g/dL    Globulin 4.2 (H) 2.0 - 4.0 g/dL    A-G Ratio 0.8 0.8 - 1.7     CBC WITH AUTOMATED DIFF    Collection Time: 10/31/22  4:24 AM   Result Value Ref Range    WBC 3.8 (L) 4.6 - 13.2 K/uL    RBC 4.91 4.35 - 5.65 M/uL    HGB 15.2 13.0 - 16.0 g/dL    HCT 44.1 36.0 - 48.0 %    MCV 89.8 78.0 - 100.0 FL    MCH 31.0 24.0 - 34.0 PG    MCHC 34.5 31.0 - 37.0 g/dL    RDW 12.6 11.6 - 14.5 %    PLATELET 089 461 - 011 K/uL    MPV 9.8 9.2 - 11.8 FL    NRBC 0.0 0  WBC    ABSOLUTE NRBC 0.00 0.00 - 0.01 K/uL    NEUTROPHILS 31 (L) 40 - 73 %    LYMPHOCYTES 55 (H) 21 - 52 %    MONOCYTES 13 (H) 3 - 10 %    EOSINOPHILS 0 0 - 5 %    BASOPHILS 0 0 - 2 %    IMMATURE GRANULOCYTES 0 0.0 - 0.5 %    ABS. NEUTROPHILS 1.2 (L) 1.8 - 8.0 K/UL    ABS. LYMPHOCYTES 2.1 0.9 - 3.6 K/UL    ABS. MONOCYTES 0.5 0.05 - 1.2 K/UL    ABS. EOSINOPHILS 0.0 0.0 - 0.4 K/UL    ABS. BASOPHILS 0.0 0.0 - 0.1 K/UL    ABS. IMM.  GRANS. 0.0 0.00 - 0.04 K/UL    DF AUTOMATED     PROTHROMBIN TIME + INR    Collection Time: 10/31/22  4:24 AM   Result Value Ref Range    Prothrombin time 13.8 11.5 - 15.2 sec    INR 1.0 0.8 - 1.2     TROPONIN-HIGH SENSITIVITY    Collection Time: 10/31/22  4:24 AM   Result Value Ref Range    Troponin-High Sensitivity 192 (HH) 0 - 78 ng/L       Signed By: Irina Gallagher MD     October 31, 2022

## 2022-11-01 ENCOUNTER — APPOINTMENT (OUTPATIENT)
Dept: NON INVASIVE DIAGNOSTICS | Age: 67
DRG: 312 | End: 2022-11-01
Attending: INTERNAL MEDICINE
Payer: COMMERCIAL

## 2022-11-01 LAB
ECHO LV FRACTIONAL SHORTENING: 6 % (ref 28–44)
ECHO LV INTERNAL DIMENSION DIASTOLE INDEX: 1.79 CM/M2
ECHO LV INTERNAL DIMENSION DIASTOLIC: 3.3 CM (ref 4.2–5.9)
ECHO LV INTERNAL DIMENSION SYSTOLIC INDEX: 1.68 CM/M2
ECHO LV INTERNAL DIMENSION SYSTOLIC: 3.1 CM
ECHO LV IVSD: 1.1 CM (ref 0.6–1)
ECHO LV MASS 2D: 109.1 G (ref 88–224)
ECHO LV MASS INDEX 2D: 59.3 G/M2 (ref 49–115)
ECHO LV POSTERIOR WALL DIASTOLIC: 1.1 CM (ref 0.6–1)
ECHO LV RELATIVE WALL THICKNESS RATIO: 0.67
ECHO RV TAPSE: 1.2 CM (ref 1.7–?)

## 2022-11-01 PROCEDURE — 74011000250 HC RX REV CODE- 250: Performed by: INTERNAL MEDICINE

## 2022-11-01 PROCEDURE — 74011250637 HC RX REV CODE- 250/637: Performed by: INTERNAL MEDICINE

## 2022-11-01 PROCEDURE — 99232 SBSQ HOSP IP/OBS MODERATE 35: CPT | Performed by: FAMILY MEDICINE

## 2022-11-01 PROCEDURE — G0378 HOSPITAL OBSERVATION PER HR: HCPCS

## 2022-11-01 PROCEDURE — 93308 TTE F-UP OR LMTD: CPT

## 2022-11-01 PROCEDURE — 96372 THER/PROPH/DIAG INJ SC/IM: CPT

## 2022-11-01 PROCEDURE — 74011250636 HC RX REV CODE- 250/636: Performed by: INTERNAL MEDICINE

## 2022-11-01 PROCEDURE — 93306 TTE W/DOPPLER COMPLETE: CPT | Performed by: INTERNAL MEDICINE

## 2022-11-01 PROCEDURE — 74011250637 HC RX REV CODE- 250/637: Performed by: FAMILY MEDICINE

## 2022-11-01 PROCEDURE — 65270000046 HC RM TELEMETRY

## 2022-11-01 PROCEDURE — 74011000250 HC RX REV CODE- 250: Performed by: FAMILY MEDICINE

## 2022-11-01 RX ORDER — SODIUM CHLORIDE 9 MG/ML
10 INJECTION INTRAMUSCULAR; INTRAVENOUS; SUBCUTANEOUS
Status: COMPLETED | OUTPATIENT
Start: 2022-11-01 | End: 2022-11-01

## 2022-11-01 RX ADMIN — SODIUM CHLORIDE 10 ML: 9 INJECTION INTRAMUSCULAR; INTRAVENOUS; SUBCUTANEOUS at 12:52

## 2022-11-01 RX ADMIN — ASPIRIN 81 MG: 81 TABLET, COATED ORAL at 09:21

## 2022-11-01 RX ADMIN — AMLODIPINE BESYLATE 10 MG: 10 TABLET ORAL at 22:30

## 2022-11-01 RX ADMIN — LISINOPRIL 40 MG: 40 TABLET ORAL at 09:20

## 2022-11-01 RX ADMIN — SODIUM CHLORIDE, PRESERVATIVE FREE 10 ML: 5 INJECTION INTRAVENOUS at 22:30

## 2022-11-01 RX ADMIN — SODIUM CHLORIDE, PRESERVATIVE FREE 10 ML: 5 INJECTION INTRAVENOUS at 05:28

## 2022-11-01 RX ADMIN — THERA TABS 1 TABLET: TAB at 09:20

## 2022-11-01 RX ADMIN — SODIUM CHLORIDE, PRESERVATIVE FREE 10 ML: 5 INJECTION INTRAVENOUS at 19:59

## 2022-11-01 RX ADMIN — ATORVASTATIN CALCIUM 20 MG: 20 TABLET, FILM COATED ORAL at 22:30

## 2022-11-01 RX ADMIN — HYDROCHLOROTHIAZIDE 25 MG: 25 TABLET ORAL at 09:21

## 2022-11-01 RX ADMIN — ENOXAPARIN SODIUM 40 MG: 100 INJECTION SUBCUTANEOUS at 18:31

## 2022-11-01 NOTE — PROGRESS NOTES
Patient complain of sweating,cold and abdominal pain which occurs on and off. Patient requested to talk to the Doctor.

## 2022-11-01 NOTE — PROGRESS NOTES
[facility]  (A Division of [address] [place] [address])      Progress note      Date of Admission: 10/30/2022    Date of Note: 11/1/2022      Reason for Referral: Sarah  Referring Physician: Dr. Alessandra Coombs from this admission:   10/30 respiratory viral panel: Positive for SARS-CoV-2    Current Antimicrobials:    Prior Antimicrobials:  None        Assessment:         Asymptomatic COVID-19 infection in unvaccinated patient-no associated fevers, patient is not hypoxic remains on room air. Chest x-ray without any acute cardiovascular changes  Rule out TIA: In progress including MRI MR a of brain and neck-no acute ischemic process or hemorrhages noted  Elevated troponin: Trending down since presentation  Mild LELAND-resolved  History of hep C-viral load undetectable  Hypertension currently controlled  Dyslipidemia: Controlled    Plan:   Encourage fluid resuscitation  No supplemental oxygen currently required; maintain sats>92  Aspiration precautions, Incentive spirometry    CBC, CMP continues to every other day  No current indication for steroids as he is not hypoxic and relatively asymptomatic  Remdesivir: Not indicated  Baricitinib: Not indicated  Tocilizumib: Not indicated    Lovenox 40 mg IV daily for DVT prophylaxis   SSI and Accuchecks for tight glucose control. Consider ancillary COVID-19 supportive therapies: Vit C, Vit D, zinc, melatonin- no proven significant benefit, however do not cause harm    Okay to discharge from infectious disease standpoint. Given age and comorbidities could consider Paxlovid dosepak 200/100 twice daily x5 days upon discharge (must be given within first 5 days)      DO [facility]  [address], [address] [address] [facility] [place], [facility]  Office: [phone], Ext [phone]  Mobile/Text: [phone]    Lines / Catheters:  Peripheral    Subjective:   He was uncomfortable overnight.   Feels unwell. was having intermittent abdominal pains with sweats. Still remains on room air without hypoxia. No fevers documented. No reported diarrhea. Nursing working on orthostatics readings. HPI:  Mr. Adelso Farmer is a 68-year-old gentleman with a history of BPH, hypertension, hepatitis C with an undetectable viral load and dyslipidemia who presented to the emergency department after having an unresponsive episode. He had been otherwise feeling well until he sat down on the toilet when he had about 3 bowel movements and that he became dizzy and diaphoretic. That point his wife had called EMS and it was noted per the report that he had vomit on his clothing. He had had a similar event about 6 months ago during a colonoscopy prep. He is currently back to his baseline mentation. No fevers or chills. He is not sure what had happened but overall is feeling better. He has been afebrile since presentation. Blood pressure in the ED was 121/71. O2 sats are 96% and he remains on room air. WBCs initially 5.0. Creatinine 1.39 troponin 207. Respiratory viral panel was positive for COVID. Patient states that he is not vaccinated for COVID due to concern for allergy. Unaware of any sick contacts now or in the past.           Objective:      Visit Vitals  BP (!) 142/74   Pulse 93   Temp 97 °F (36.1 °C)   Resp 17   Ht 5' 7\" (1.702 m)   Wt 72.6 kg (160 lb)   SpO2 99%   BMI 25.06 kg/m²     Temp (24hrs), Av.7 °F (36.5 °C), Min:97 °F (36.1 °C), Max:98.2 °F (36.8 °C)        General:   awake alert and oriented   Skin:   no rashes or skin lesions noted on limited exam   HEENT:  Normocephalic, atraumatic, PERRL, EOMI, no scleral icterus or pallor; no conjunctival hemmohage;  nasal and oral mucous are moist and without evidence of lesions. No thrush. Dentition good. Neck supple, no bruits.    Lymph Nodes:   no cervical, axillary or inguinal adenopathy   Lungs:   non-labored, bilaterally clear to aspiration- no crackles wheezes rales or rhonchi Heart:  RRR, s1 and s2; no murmurs rubs or gallops, no edema, + pedal pulses   Abdomen:  soft, non-distended, active bowel sounds, no hepatomegaly, no splenomegaly. Non-tender   Genitourinary:  deferred   Extremities:   no clubbing, cyanosis; no joint effusions or swelling; Full ROM of all large joints to the upper and lower extremities; muscle mass appropriate for age   Neurologic:  No gross focal sensory abnormalities; 5/5 muscle strength to upper and lower extremities. Speech appropirate. Cranial nerves intact   Psychiatric:   appropriate and interactive. Labs: Results:   Chemistry Recent Labs     10/31/22  0424 10/30/22  1244   * 133*    138   K 4.3 3.7    103   CO2 28 28   BUN 17 16   CREA 1.19 1.39*   CA 8.9 8.6   AGAP 5 7   BUCR 14 12   AP 79 79   TP 7.6 8.3*   ALB 3.4 4.0   GLOB 4.2* 4.3*   AGRAT 0.8 0.9      CBC w/Diff Recent Labs     10/31/22  0424 10/30/22  1244   WBC 3.8* 5.0   RBC 4.91 5.15   HGB 15.2 16.0   HCT 44.1 46.0    257   GRANS 31* 43   LYMPH 55* 45   EOS 0 0            No results found for: SDES No results found for: CULT     Results       Procedure Component Value Units Date/Time    COVID-19 WITH INFLUENZA A/B [[id number]] Collected: 10/30/22 1548    Order Status: Canceled Specimen: Nasopharyngeal     RESPIRATORY VIRUS PANEL W/COVID-19, PCR [[id number]]  (Abnormal) Collected: 10/30/22 1548    Order Status: Completed Specimen: Nasopharyngeal Updated: 10/30/22 1657     Adenovirus Not detected        Coronavirus 229E Not detected        Coronavirus HKU1 Not detected        Coronavirus CVNL63 Not detected        Coronavirus OC43 Not detected        SARS-CoV-2, PCR Detected        Comment: CALLED TO AND CORRECTLY REPEATED BY:  Kathleen Camilo RN ED AT [phone] TO [phone].           Ariadna Curb Not detected        Rhinovirus and Enterovirus Not detected        Influenza A Not detected        Influenza A, subtype H1 Not detected        Influenza A, subtype H3 Not detected        INFLUENZA A H1N1 PCR Not detected        Influenza B Not detected        Parainfluenza 1 Not detected        Parainfluenza 2 Not detected        Parainfluenza 3 Not detected        Parainfluenza virus 4 Not detected        RSV by PCR Not detected        B. parapertussis, PCR Not detected        Bordetella pertussis - PCR Not detected        Chlamydophila pneumoniae DNA, QL, PCR Not detected        Mycoplasma pneumoniae DNA, QL, PCR Not detected       RSV AG - RAPID [[id number]] Collected: 10/30/22 1541    Order Status: Canceled               Imaging:      All imaging reviewed from Admission to present as per radiology interpretation in [address] [place] [address]

## 2022-11-01 NOTE — PROGRESS NOTES
Problem: Pain  Goal: *Control of Pain  Outcome: Progressing Towards Goal     Problem: Patient Education: Go to Patient Education Activity  Goal: Patient/Family Education  Outcome: Progressing Towards Goal     Problem: Pain  Goal: *Control of Pain  Outcome: Progressing Towards Goal     Problem: Patient Education: Go to Patient Education Activity  Goal: Patient/Family Education  Outcome: Progressing Towards Goal     Problem: Patient Education: Go to Patient Education Activity  Goal: Patient/Family Education  Outcome: Progressing Towards Goal

## 2022-11-02 VITALS
HEIGHT: 67 IN | HEART RATE: 81 BPM | RESPIRATION RATE: 17 BRPM | OXYGEN SATURATION: 99 % | BODY MASS INDEX: 25.11 KG/M2 | DIASTOLIC BLOOD PRESSURE: 76 MMHG | TEMPERATURE: 97.7 F | SYSTOLIC BLOOD PRESSURE: 133 MMHG | WEIGHT: 160 LBS

## 2022-11-02 PROCEDURE — 74011000250 HC RX REV CODE- 250: Performed by: INTERNAL MEDICINE

## 2022-11-02 PROCEDURE — G0378 HOSPITAL OBSERVATION PER HR: HCPCS

## 2022-11-02 PROCEDURE — 99238 HOSP IP/OBS DSCHRG MGMT 30/<: CPT | Performed by: FAMILY MEDICINE

## 2022-11-02 PROCEDURE — 74011250637 HC RX REV CODE- 250/637: Performed by: FAMILY MEDICINE

## 2022-11-02 PROCEDURE — 74011250637 HC RX REV CODE- 250/637: Performed by: INTERNAL MEDICINE

## 2022-11-02 RX ADMIN — LISINOPRIL 40 MG: 40 TABLET ORAL at 08:28

## 2022-11-02 RX ADMIN — HYDROCHLOROTHIAZIDE 25 MG: 25 TABLET ORAL at 08:28

## 2022-11-02 RX ADMIN — THERA TABS 1 TABLET: TAB at 08:28

## 2022-11-02 RX ADMIN — ASPIRIN 81 MG: 81 TABLET, COATED ORAL at 08:28

## 2022-11-02 RX ADMIN — SODIUM CHLORIDE, PRESERVATIVE FREE 10 ML: 5 INJECTION INTRAVENOUS at 05:15

## 2022-11-02 NOTE — PROGRESS NOTES
Problem: Pain  Goal: *Control of Pain  Outcome: Progressing Towards Goal     Problem: Patient Education: Go to Patient Education Activity  Goal: Patient/Family Education  Outcome: Progressing Towards Goal     Problem: Pain  Goal: *Control of Pain  Outcome: Progressing Towards Goal

## 2022-11-02 NOTE — PROGRESS NOTES
Morning rounds  Pt received in bed. No complaints of pain or discomfort. No s/s of any distress noted. Call bell within reach. Bed is in the lowest position. Will continue to monitor.

## 2022-11-02 NOTE — PROGRESS NOTES
[facility]  Progress Note    Patient: Ninfa Castillo Age: 69 y.o. : 1955 MR#: [id number] SSN: [id number]  Date: 2022     Subjective/24-hour events:     Had episode of diaphoresis and lightheadedness this morning after getting up to restroom to have a bowel movement. No shortness of breath or chest pain reported. Orthostatic blood pressures done this morning and 2D echo completed. Assessment:   Syncope  Orthostatic hypotension  COVID-19 infection, asymptomatic  Elevated troponin  Hypertension  Hyperlipidemia  History of hepatitis C status posttreatment  BPH    Plan:   Echo results reviewed, EF normal.  No valvular pathology noted. Orthostatic BP results reviewed. Patient is orthostatic. I have encouraged that patient sure adequate daily fluid intake and importance of minimizing rapid changes in position. Have also advised that patient obtain a blood pressure cuff for home use as directed. Continue to monitor BPs as home medications have been resumed. Remains asymptomatic with regard to symptoms that would require continued hospitalization for COVID-19 infection. ID recommendations appreciated. Will monitor tonight given near syncopal episode this morning. If no new issues by tomorrow, plan to discharge home with outpatient follow-up as advised. Case discussed with:  [x]Patient  []Family  [x]Nursing  [x]Case Management  DVT Prophylaxis:  [x]Lovenox  []Hep SQ  []SCDs  []Coumadin   []On Heparin gtt    Objective:   VS: Visit Vitals  /81   Pulse 84   Temp 96.8 °F (36 °C)   Resp 17   Ht 5' 7\" (1.702 m)   Wt 72.6 kg (160 lb)   SpO2 98%   BMI 25.06 kg/m²     General:  In NAD. Nontoxic-appearing. Cardiovascular:  RRR. Pulmonary:  Lungs clear bilaterally, no wheezes. No accessory muscle use. GI:  Abdomen soft, NTTP. Extremities:  Warm, no edema or ischemia. Neuro:  Awake and alert.   Moves extremities spontaneously, motor grossly nonfocal.    Labs:    Recent Results (from the past 24 hour(s))   ECHO ADULT FOLLOW-UP OR LIMITED    Collection Time: 11/01/22 12:52 PM   Result Value Ref Range    TAPSE 1.2 (A) 1.7 cm    IVSd 1.1 (A) 0.6 - 1.0 cm    LVIDd 3.3 (A) 4.2 - 5.9 cm    LVIDs 3.1 cm    LVPWd 1.1 (A) 0.6 - 1.0 cm    Fractional Shortening 2D 6 28 - 44 %    LVIDd Index 1.79 cm/m2    LVIDs Index 1.68 cm/m2    LV RWT Ratio 0.67     LV Mass 2D 109.1 88 - 224 g    LV Mass 2D Index 59.3 49 - 115 g/m2       Signed By: Carmen Reese MD     November 1, 2022

## 2022-11-02 NOTE — PROGRESS NOTES
D/C order noted for today. Orders reviewed. No needs identified at this time. Patient's wife will be transporting patient home at time of discharge. CM remains available if needed.            Ny Gold, [phone]

## 2022-11-02 NOTE — PROGRESS NOTES
Pt is alert and oriented and able to make needs known. No s/s of any pain or discomfort. Discharge instructions reviewed in full detail with the patient. Opportunity given for questions and answers provided. All belongings are with the patient. IV was removed and catheter is intact. Pt was wheeled down in  the wheelchair. Wife will be transported home. Pt is discharged in stable condition.

## 2022-11-02 NOTE — PROGRESS NOTES
Problem: Pain  Goal: *Control of Pain  Outcome: Progressing Towards Goal     Problem: Pain  Goal: *Control of Pain  Outcome: Progressing Towards Goal     Problem: Falls - Risk of  Goal: *Absence of Falls  Description: Document Jose Francisco Fall Risk and appropriate interventions in the flowsheet.   Outcome: Progressing Towards Goal  Note: Fall Risk Interventions:  Mobility Interventions: PT Consult for mobility concerns         Medication Interventions: Evaluate medications/consider consulting pharmacy         History of Falls Interventions: Consult care management for discharge planning

## 2022-11-03 ENCOUNTER — PATIENT OUTREACH (OUTPATIENT)
Dept: CASE MANAGEMENT | Age: 67
End: 2022-11-03

## 2022-11-03 NOTE — PROGRESS NOTES
Care Transitions Initial Call    Call within 2 business days of discharge: Yes     Care Transitions Nurse attempted to contact patient to complete transitions of care assessment and follow up. Unable to reach. Left [facility] requesting a return call. Plan to try to contact patient again tomorrow. Patient: Janae Austin Patient : 1955 MRN: [id number]    Last Discharge  Street       Date Complaint Diagnosis Description Type Department Provider    10/30/22 Dizziness Syncope, unspecified syncope type [R55 (ICD-10-CM)] ED to Hosp-Admission (Discharged) (ADMIT) Georgia Beck MD; Genna Madrid. .. Patient was admitted to [facility] from 10/30/22 to 22 for syncope 2/2 orthostatic hypotension. He was found to have asymptomatic COVID-19. Was this an external facility discharge? No Discharge Facility: [facility]        Discussed COVID-19 related testing which was available at this time. Test results were positive. Patient informed of results, if available? yes     Advance Care Planning:   Does patient have an Advance Directive: not on file. Inpatient Readmission Risk score: Unplanned Readmit Risk Score: 4.7    Was this a readmission? no       Patients top risk factors for readmission: medical condition-orthostatic hypotension        Home Health/Outpatient orders at discharge: none      Durable Medical Equipment ordered at discharge: None      Was patient discharged with a pulse oximeter? no    If no appointment was previously scheduled, appointment scheduling offered:  yes, referred to  .  Is follow up appointment scheduled within 7 days of discharge? no.   [facility] follow up appointment(s):   Future Appointments   Date Time Provider Asya Carballo   11/15/2022 11:45 AM Dimitri Vazquez MD [facility] BS AMB   2022  1:00 PM El English MD [facility] BS AMB   2023 11:15 AM Luther Vazquez MD [facility] BS AMB     [facility] follow up appointment(s): n/a    Plan for follow-up call in 1-2 days based on severity of symptoms and risk factors. Plan for next call:  Complete initial KD outreach. CTN provided contact information for future needs.      Goals Addressed    None

## 2022-11-04 ENCOUNTER — PATIENT OUTREACH (OUTPATIENT)
Dept: CASE MANAGEMENT | Age: 67
End: 2022-11-04

## 2022-11-04 RX ORDER — TRIAMCINOLONE ACETONIDE 1 MG/G
OINTMENT TOPICAL AS NEEDED
COMMUNITY

## 2022-11-04 NOTE — PROGRESS NOTES
Care Transitions Initial Call    Call within 2 business days of discharge: Yes     Patient: Ninfa Castillo Patient : 1955 MRN: [id number]    Last Discharge  Street       Date Complaint Diagnosis Description Type Department Provider    10/30/22 Dizziness Syncope, unspecified syncope type [R55 (ICD-10-CM)] ED to Hosp-Admission (Discharged) (ADMIT) Guanako Mitchell MD; Joey Gray. .. Patient was admitted to [facility] from 10/30/22 to 22 for syncope 2/2 orthostatic hypotension. He was found to have asymptomatic COVID-19. Was this an external facility discharge? No Discharge Facility: [facility]    Challenges to be reviewed by the provider   Additional needs identified to be addressed with provider: no  none         Method of communication with provider : none    Discussed COVID-19 related testing which was available at this time. Test results were positive. Patient informed of results, if available? yes     Advance Care Planning:   Does patient have an Advance Directive:  no, patient plans to complete one with a  . Inpatient Readmission Risk score: Unplanned Readmit Risk Score: 4.7    Was this a readmission? no   Patient stated reason for the admission: I was told there was an issue with the blood flow being cut off    Patients top risk factors for readmission:  medical condition-orthostatic hypotension    Interventions to address risk factors: Scheduled appointment with PCP-Referred to  to try to get an earlier appt., Obtained and reviewed discharge summary and/or continuity of care documents, Education of patient/family/caregiver/guardian to support self-management-Reviewed target BP goals and when to call PCP for low BP. Reviewed importance of sitting down as soon as he feels any presyncope s/s to prevent a fall and encouraged plenty of fluids. , and Assessment and support for treatment adherence and medication management-Denies any red flags including dizziness/lightheadedness or feeling faint. He's been taking it easy since he's been home. He doesn't eat large meals, but snacks throughout the day. He plans to obtain a BP monitor and start checking BP daily. Care Transition Nurse (CTN) contacted the patient by telephone to perform post hospital discharge assessment. Verified name and  with patient as identifiers. Provided introduction to self, and explanation of the CTN role. CTN reviewed discharge instructions, medical action plan and red flags with patient who verbalized understanding. Were discharge instructions available to patient? yes. Reviewed appropriate site of care based on symptoms and resources available to patient including: PCP. Patient given an opportunity to ask questions and does not have any further questions or concerns at this time. The patient agrees to contact the PCP office for questions related to their healthcare. Medication reconciliation was performed with patient, who verbalizes understanding of administration of home medications. Advised obtaining a 90-day supply of all daily and as-needed medications. Referral to Pharm D needed: no     Home Health/Outpatient orders at discharge: none      Durable Medical Equipment ordered at discharge: None      Was patient discharged with a pulse oximeter? no    Discussed follow-up appointments. If no appointment was previously scheduled, appointment scheduling offered:  yes, referred to  . Is follow up appointment scheduled within 7 days of discharge? no.   [facility] follow up appointment(s):   Future Appointments   Date Time Provider Asya Carballo   11/15/2022 11:45 AM Suresh Vazquez MD [facility] BS AMB   2022  1:00 PM Stella Mosher MD [facility] BS AMB   2023 11:15 AM Luther Vazquez MD [facility] AMB     [facility] follow up appointment(s): n/a    Patient declined follow up calls from CTN and states a plan to call if he has questions or needs anything.  Resolving Transitions of Care episode. CTN provided contact information for future needs.      Goals Addressed    None

## 2022-11-14 NOTE — DISCHARGE SUMMARY
Discharge Summary    Patient: Toño Aldrich MRN: [id number]  CSN: [id number]    YOB: 1955  Age: 69 y.o. Sex: male    DOA: 10/30/2022 LOS:  LOS: 3 days   Discharge Date: 11/2/2022     Admission Diagnoses: Syncope [R55]  Elevated troponin [R77.8]    Discharge Diagnoses:    Syncope  Orthostatic hypotension  COVID-19 infection, asymptomatic  Elevated troponin  Hypertension  Hyperlipidemia  History of hepatitis C status posttreatment  BPH    Discharge Condition: Stable    PHYSICAL EXAM  Visit Vitals  /76   Pulse 81   Temp 97.7 °F (36.5 °C)   Resp 17   Ht 5' 7\" (1.702 m)   Wt 72.6 kg (160 lb)   SpO2 99%   BMI 25.06 kg/m²       General: In NAD. Nontoxic-appearing. HEENT: NCAT. Sclerae anicteric. Lungs:  Clear, no wheezes. No accessory muscle use. Heart:  RRR. Abdomen: Soft, NT/ND. Extremities: Warm, no edema or ischemia. Psych:   Mood normal.  Neurologic:  Awake and alert, moves extremities spontaneously. Hospital Course:   See admission H&P for full details of HPI. Patient was admitted to the hospital after presenting to the emergency department following a syncopal episode. There was concern for possible CVA but stroke work-up was negative. Orthostatic blood pressures were done and patient was noted to be fairly significantly orthostatic. Suspect that this is likely the etiology of patient's syncope. He has been hydrated with IV fluids and has been encouraged to ensure adequate daily fluid intake once discharged. Patient has also been counseled on being conscious of avoiding rapid positional changes. With regard to COVID infection, this has been asymptomatic and no treatment has been required. Patient is felt to to have met maximum benefit of hospitalization and is medically stable for discharge home with outpatient follow-up as advised.     Consults:   Infectious diseases    Significant Diagnostic Studies/Procedures:   CT head:  COMPARISON: None     TECHNIQUE: Helical axial scan was obtained from the skull base to the vertex  without IV contrast administration. All CT scans at this facility are performed using dose optimization of technique  as appropriate to a performed exam, to include automated exposure control,  adjustment of the mA and/or kV according to patient size (including appropriate  matching for site specific examinations), or use of iterative reconstruction  technique. FINDINGS:  The ventricles and sulci are normal in size, shape and position for patient's  age. Scattered low attenuation foci are identified along the periventricular and  subcortical white matter, a nonspecific finding that is most commonly  encountered in the setting of chronic microvascular disease. .  Visualized portion of orbits and sinuses appear unremarkable. No hemorrhage identified. No mass lesion identified. No acute infarction identified. Typical posterior fossa artifacts     IMPRESSION     1. No evidence of acute intracranial process  2. Mild chronic small vessel ischemic disease       MRI brain:  COMPARISON: Preceding head CT     FINDINGS:     Parenchyma: A mild to moderate extent of chronic white matter disease with T2  and FLAIR hyperintensities. Diffusion imaging is without restriction. No acute  infarction. No acute hemorrhage. No mass lesion. CSF spaces: Ventricles and cisterns remain midline in position     IAC regions: Unremarkable     Parasellar region: Unremarkable     Vasculature: Appropriate flow voids within the major skull base vasculature. Cervicomedullary junction: Patent     Orbits: Unremarkable     Paranasal sinuses: Clear            IMPRESSION     1. No acute intracranial hemorrhage or territorial infarct. No mass effect.    -Mild to moderate extent of chronic white matter disease, typically small vessel  ischemic findings  -Concordant preliminary interpretation was submitted 10/31/2022 at 0245 hours by  Dr. Elmo High.      MRI brain:  COMPARISON: Syncopal episode     TECHNIQUE: The [place] vasculature was assessed with axial 3D TOF  source images from near foramen magnum up to sylvian region, appropriately  reformatted. FINDINGS:      Right Anterior Circulation-   Right ICA: Unremarkable  Right MCA: Possibly mild atherosclerotic irregularity on reformatted imaging,  though looks better on the source imaging. Patent downstream.  Right OSMAR: Unremarkable     ACOM: No focal finding     Left Anterior Circulation-  Left ICA: Unremarkable   Left MCA: Mild narrowing at the proximal aspect, widely patent downstream  Left OSMAR: Unremarkable     PCOM: No focal finding     Posterior circulation-   Vertebral arteries: Patent   Basilar: Unremarkable   Right PCA: Patent  Left PCA: Patent        IMPRESSION     1. No acute intracranial large vessel occlusion, and no critical stenoses     2. Concordant preliminary interpretation was submitted 10/31/2022 at 0244 hours  by Dr. Triston Solomon.          MRA neck:  COMPARISON: None     TECHNIQUE: Standard MRA protocol of the neck arteries without IV contrast     FINDINGS:            Right carotid: Patent. Estimated no/0% stenosis proximal JULEE by NASCET criteria. Left carotid: Patent. Estimated no/0% stenosis proximal LICA by NASCET criteria. Right vertebral: Patent      Left vertebral: Patent     Other structures: Not applicable     IMPRESSION  1. Patent extracranial brain arteries. 2. Concordant preliminary interpretation was submitted 10/31/2022 at 0242 hours  by Dr. Triston Solomon.      CXR:  COMPARISON: November 14, 2014     TECHNIQUE: 1 VIEWS     FINDINGS:   EKG leads and wires overlie the chest.  The lungs are clear. Atherosclerotic vascular calcifications. No cardiomegaly. No evidence of pleural effusion. IMPRESSION  1. No active cardiopulmonary disease      2D echo:   Interpretation Summary    Result status: Final result       Covid +    Technical qualifiers: Echo study was limited due to patient's condition and technically difficult due to low parasternal window. Left Ventricle: Normal left ventricular systolic function with a visually estimated EF of 55 - 60%. Left ventricle size is normal. Increased wall thickness. Findings consistent with mild concentric hypertrophy. Normal wall motion. Interatrial Septum: No interatrial shunt visualized with color Doppler. Grade 0 Absence of bubbles. Agitated saline study was negative with and without provocation. Comparison Study Information    Prior Study    There is a prior study available for comparison. Prior study date: 10/1/2019. As compared to the previous study, there are no significant changes. Discharge Medications:     Discharge Medication List as of 11/2/2022  9:37 AM        CONTINUE these medications which have NOT CHANGED    Details   amLODIPine (NORVASC) 5 mg tablet take 2 tablets by mouth once daily, Normal, Disp-180 Tablet, R-1      atorvastatin (LIPITOR) 20 mg tablet Take 1 Tablet by mouth daily. , Normal, Disp-30 Tablet, R-3      lisinopriL (PRINIVIL, ZESTRIL) 40 mg tablet take 1 tablet by mouth once daily, Normal, Disp-90 Tablet, R-3      aspirin 81 mg tablet Take 81 mg by mouth daily. , Historical Med      MULTIVITAMIN PO Take  by mouth daily. , Historical Med      hydroCHLOROthiazide (HYDRODIURIL) 25 mg tablet Take 25 mg by mouth daily. , Historical Med      permethrin (ACTICIN) 5 % topical cream apply sparingly as directed, Apply to entire body; leave on for 8 to 14 hours before washing off with water, Normal, Disp-120 g, R-2               Activity: As tolerated. Diet: Cardiac. Disposition: Home. Follow-up: with PCP in 1 week. Aba Arias MD  [address] [place], [place] [address]ists    Disclaimer: Sections of this note are dictated using utilizing voice recognition software. Minor typographical errors may be present.  If questions arise, please do not hesitate to contact me or call our department.

## 2022-11-15 ENCOUNTER — VIRTUAL VISIT (OUTPATIENT)
Dept: FAMILY MEDICINE CLINIC | Age: 67
End: 2022-11-15
Payer: COMMERCIAL

## 2022-11-15 DIAGNOSIS — M10.9 ACUTE GOUT, UNSPECIFIED CAUSE, UNSPECIFIED SITE: ICD-10-CM

## 2022-11-15 DIAGNOSIS — U07.1 COVID-19: Primary | ICD-10-CM

## 2022-11-15 DIAGNOSIS — I10 ESSENTIAL HYPERTENSION: ICD-10-CM

## 2022-11-15 DIAGNOSIS — I95.1 ORTHOSTATIC HYPOTENSION: ICD-10-CM

## 2022-11-15 DIAGNOSIS — R77.8 ELEVATED TROPONIN: ICD-10-CM

## 2022-11-15 DIAGNOSIS — Z09 HOSPITAL DISCHARGE FOLLOW-UP: ICD-10-CM

## 2022-11-15 DIAGNOSIS — E78.5 DYSLIPIDEMIA: ICD-10-CM

## 2022-11-15 PROCEDURE — 99495 TRANSJ CARE MGMT MOD F2F 14D: CPT | Performed by: FAMILY MEDICINE

## 2022-11-15 PROCEDURE — 1111F DSCHRG MED/CURRENT MED MERGE: CPT | Performed by: FAMILY MEDICINE

## 2022-11-15 RX ORDER — AMOXICILLIN AND CLAVULANATE POTASSIUM 875; 125 MG/1; MG/1
1 TABLET, FILM COATED ORAL EVERY 12 HOURS
Qty: 20 TABLET | Refills: 0 | Status: SHIPPED | OUTPATIENT
Start: 2022-11-15 | End: 2022-11-25

## 2022-11-15 RX ORDER — LISINOPRIL 40 MG/1
TABLET ORAL
Qty: 90 TABLET | Refills: 3 | Status: SHIPPED | OUTPATIENT
Start: 2022-11-15

## 2022-11-15 NOTE — PROGRESS NOTES
Ramila Ojeda is a 69 y.o. male, evaluated via audio-only technology on 11/15/2022 for Positive For Covid-19 (Tested positive 11 days ago still having symptoms) and Hospital Follow Up  . Assessment & Plan:       ICD-10-CM ICD-9-CM    1. CYSMP-39  U07.1 079.89       2. Orthostatic hypotension  I95.1 458.0       3. Essential hypertension  I10 401.9 lisinopriL (PRINIVIL, ZESTRIL) 40 mg tablet      4. Elevated troponin  R77.8 790.6       5. Dyslipidemia  E78.5 272.4       6. Acute gout, unspecified cause, unspecified site  M10.9 274.01       7. Hospital discharge follow-up  Z09 V67.59 W[place] DISCHARGE MEDS RECONCILED W/ CURRENT OUTPATIENT MED LIST        Subjective:   Ramila Ojeda had this visit for transitionalcare and posthospital visit. Patient was admitted at [facility] from 95/92/3927-50/76/2489 for systolic hypertension, syncope, COVID-19 and elevated troponin. COVID-19: Patient was seen in the hospital and diagnosed with COVID-19 infection. He is currently doing supportive care. He has isolated at home. He is with his wife. Denies fever or chills but still feels weak. Has been unable to get back to work due to this. I did discuss the recommendations by the CDC whether would isolate for at least 5 days. Patient has been having an occasional cough that is productive of mucopurulent phlegm. Denies fever or chills. States that he has sinus pressure and congestion. He would like to get Augmentin for sinusitis and bronchitis. This is a medication that has helped him in the past.  Prescription will be sent in. Orthostatic hypotension: Patient had an episode of syncope at home. He was admitted at [facility] from 10/30/2022-11/02/2022. He was admitted with orthostatic hypotension, syncope and COVID-19. We discussed orthostatic hypotension and management options. We discussed supportive care for the same. Patient is due to follow-up with a cardiologist this.   States that he feels that he will not be able to get back to work given his age and health status. Elevated troponin: Patient noted to have elevated troponin during most recent admission. He needs to follow-up with a cardiologist.  Denies chest pain, shortness of breath or diaphoresis. Patient has an appointment to follow-up with Dr. Stuart Grullon. He is on lisinopril and atorvastatin. He takes aspirin daily. We will continue these medications. Dyslipidemia: Patient has a history of dyslipidemia. He is on Lipitor. We will continue with current treatment plan. Hypertension: Patient has a history of hypertension. He is on lisinopril and amlodipine. He will continue with the current treatment plan. Denies changes in vision or focal weakness. Gout arthritis: Patient has gout arthritis. Had a recent gout attack and took colchicine with good result. He will continue with supportive care. He will take a purine restricted diet. Prior to Admission medications    Medication Sig Start Date End Date Taking? Authorizing Provider   lisinopriL (PRINIVIL, ZESTRIL) 40 mg tablet take 1 tablet by mouth once daily 11/15/22  Yes Luther Greer MD   amoxicillin-clavulanate (AUGMENTIN) 875-125 mg per tablet Take 1 Tablet by mouth every twelve (12) hours for 10 days. 11/15/22 11/25/22 Yes Luther Vazquez MD   hydroCHLOROthiazide (HYDRODIURIL) 25 mg tablet Take 25 mg by mouth daily. Yes Provider, Historical   amLODIPine (NORVASC) 5 mg tablet take 2 tablets by mouth once daily 9/21/22  Yes Luther Vazquez MD   atorvastatin (LIPITOR) 20 mg tablet Take 1 Tablet by mouth daily. Patient taking differently: Take 10 mg by mouth every other day. 5/23/22  Yes Luther Vazquez MD   aspirin 81 mg tablet Take 81 mg by mouth daily. Yes Provider, Historical   MULTIVITAMIN PO Take 1 Tablet by mouth daily.    Yes Provider, Historical   triamcinolone acetonide (KENALOG) 0.1 % ointment Apply  to affected area as needed for Skin Irritation. use thin layer  Patient not taking: Reported on 11/15/2022    Provider, Historical   permethrin (ACTICIN) 5 % topical cream apply sparingly as directed, Apply to entire body; leave on for 8 to 14 hours before washing off with water  Patient not taking: Reported on 11/15/2022 9/28/22   Luther Vazquez MD   lisinopriL (PRINIVIL, ZESTRIL) 40 mg tablet take 1 tablet by mouth once daily 12/29/21 11/15/22  Anupama Avina MD     ROS Review of all systems is negative except as noted above in the HPI. Patient-Reported Weight: [address] [facility] was evaluated through a patient-initiated, synchronous (real-time) audio only encounter. He (or guardian if applicable) is aware that it is a billable service, which includes applicable co-pays, with coverage as determined by his insurance carrier. This visit was conducted with the patient's (and/or Braeden Hayes guardian's) verbal consent. He has not had a related appointment within my department in the past 7 days or scheduled within the next 24 hours. The patient was located in a state where the provider was licensed to provide care. The patient was located at: Home: [address] [place] [address]  The provider was located at:  Facility (Appt Department): [address] [place] [address] 8673 [phone]    Total Time: minutes: 39    Luther Rodriguez MD

## 2022-11-15 NOTE — PROGRESS NOTES
1. \"Have you been to the ER, urgent care clinic since your last visit? Hospitalized since your last visit? \" Yes When: [place] 12 days ago  Covid 19 Where: n Reason for visit: [place]    2. \"Have you seen or consulted any other health care providers outside of the [address] [place] [address] since your last visit? \" No     3. For patients aged 39-70: Has the patient had a colonoscopy / FIT/ Cologuard? Yes      If the patient is female:    4. For patients aged 41-77: Has the patient had a mammogram within the past 2 years? NA - based on age or sex      11. For patients aged 21-65: Has the patient had a pap smear?  NA - based on age or sex

## 2022-11-28 ENCOUNTER — TELEPHONE (OUTPATIENT)
Dept: FAMILY MEDICINE CLINIC | Age: 67
End: 2022-11-28

## 2022-11-28 NOTE — TELEPHONE ENCOUNTER
Pt wanted to discuss paperwork that will be faxed over to the office today 11/28 regarding this pt's disability status. Patient was scheduled for a virtual visit for tomorrow morning 11/29 at 7:45am. Message was taking for documenting purposes only.

## 2022-11-29 ENCOUNTER — DOCUMENTATION ONLY (OUTPATIENT)
Dept: FAMILY MEDICINE CLINIC | Age: 67
End: 2022-11-29

## 2022-11-29 ENCOUNTER — VIRTUAL VISIT (OUTPATIENT)
Dept: FAMILY MEDICINE CLINIC | Age: 67
End: 2022-11-29
Payer: COMMERCIAL

## 2022-11-29 DIAGNOSIS — U07.1 COVID-19: Primary | ICD-10-CM

## 2022-11-29 DIAGNOSIS — I10 ESSENTIAL HYPERTENSION: ICD-10-CM

## 2022-11-29 DIAGNOSIS — E78.5 DYSLIPIDEMIA: ICD-10-CM

## 2022-11-29 DIAGNOSIS — I95.1 ORTHOSTATIC HYPOTENSION: ICD-10-CM

## 2022-11-29 PROBLEM — R77.8 ELEVATED TROPONIN: Status: RESOLVED | Noted: 2022-10-30 | Resolved: 2022-11-29

## 2022-11-29 PROBLEM — R79.89 ELEVATED TROPONIN: Status: RESOLVED | Noted: 2022-10-30 | Resolved: 2022-11-29

## 2022-11-29 PROCEDURE — 1123F ACP DISCUSS/DSCN MKR DOCD: CPT | Performed by: FAMILY MEDICINE

## 2022-11-29 PROCEDURE — 99214 OFFICE O/P EST MOD 30 MIN: CPT | Performed by: FAMILY MEDICINE

## 2022-11-29 NOTE — PROGRESS NOTES
Kristopher Cornell is a 69 y.o. male who was seen by synchronous (real-time) audio-video technology on 11/29/2022 for Other (Disability paperwork )        Assessment & Plan:       ICD-10-CM ICD-9-CM    1. COVID-19  U07.1 079.89       2. Orthostatic hypotension  I95.1 458.0       3. Essential hypertension  I10 401.9       4. Dyslipidemia  E78.5 272.4         Subjective:   Kristopher Cornell is seen for follow-up care. COVID-19: Patient has history of COVID-19. Continues to have shortness of breath and malaise and fatigue. He was admitted on 30 October 2022 for 3 days at [facility] for this. Since then continues to feel weak and there is not yet back to his usual self. He has not been able to get back to work due to the shortness of breath that comes on and off. He denies hemoptysis, cough, fever or chills. 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 He is unable to do this. He also has to walk long distances carrying along heavy equipment that is more than 30 pounds. Reports that he is not stable enough to get back to work. Patient has paperwork that he needs to be filled out on this. This was done. Orthostatic hypotension: Patient has history of orthostatic hypotension. Continues to have dizziness. He has to get up from laying down position very slowly. He has not fallen or passed out at the moment. He is trying to stay rehydrated. The dizziness however has been persistent and he has an appointment to follow-up with a cardiologist.  He is not stable enough to get back to work due to persistent dizziness. Hypertension: Patient has hypertension. Blood pressure has been stable. He is on lisinopril and HCTZ. The patient will continue with the current treatment plan. Dyslipidemia: Patient has dyslipidemia. Patient is on Lipitor. Stable on medication. Continue with the current treatment plan.       Prior to Admission medications    Medication Sig Start Date End Date Taking? Authorizing Provider   lisinopriL (PRINIVIL, ZESTRIL) 40 mg tablet take 1 tablet by mouth once daily 11/15/22  Yes Luther Galvin MD   amLODIPine (NORVASC) 5 mg tablet take 2 tablets by mouth once daily 9/21/22  Yes Mary Mendenhall MD   atorvastatin (LIPITOR) 20 mg tablet Take 1 Tablet by mouth daily. Patient taking differently: Take 10 mg by mouth every other day. 5/23/22  Yes Luther Vazquez MD   aspirin 81 mg tablet Take 81 mg by mouth daily. Yes Provider, Historical   MULTIVITAMIN PO Take 1 Tablet by mouth daily. Yes Provider, Historical   triamcinolone acetonide (KENALOG) 0.1 % ointment Apply  to affected area as needed for Skin Irritation. use thin layer  Patient not taking: No sig reported    Provider, Historical   hydroCHLOROthiazide (HYDRODIURIL) 25 mg tablet Take 25 mg by mouth daily. Provider, Historical   permethrin (ACTICIN) 5 % topical cream apply sparingly as directed, Apply to entire body; leave on for 8 to 14 hours before washing off with water  Patient not taking: No sig reported 9/28/22   Mary Mendenhall MD     ROS Review of all systems is negative except as noted above in the HPI. Objective:     Patient-Reported Vitals 11/29/2022   Patient-Reported Weight 160   Constitutional: [x] Appears well-developed and well-nourished [x] No apparent distress      Mental status: [x] Alert and awake  [x] Oriented to person/place/time [x] Able to follow commands     HENT: [x] Normocephalic, atraumatic       Pulmonary/Chest: [x] Respiratory effort normal   [x] No visualized signs of difficulty breathing or respiratory distress    Neurological:        [x] No Facial Asymmetry (Cranial nerve 7 motor function) (limited exam due to video visit)                     Psychiatric:       [x] Normal Affect       We discussed the expected course, resolution and complications of the diagnosis(es) in detail.   Medication risks, benefits, costs, interactions, and alternatives were discussed as indicated. I advised him to contact the office if his condition worsens, changes or fails to improve as anticipated. He expressed understanding with the diagnosis(es) and plan. Brigida Conte, was evaluated through a synchronous (real-time) audio-video encounter. The patient (or guardian if applicable) is aware that this is a billable service, which includes applicable co-pays. This Virtual Visit was conducted with patient's (and/or legal guardian's) consent. The visit was conducted pursuant to the emergency declaration under the [address] [place] [address], [address] [place] [address] authority and the [employer] General Act. Patient identification was verified, and a caregiver was present when appropriate. The patient was located at: Home: [address] [place] [address]  The provider was located at:  Facility (Appt Department): [address] MD

## 2022-11-29 NOTE — PROGRESS NOTES
1. \"Have you been to the ER, urgent care clinic since your last visit? Hospitalized since your last visit? \" No    2. \"Have you seen or consulted any other health care providers outside of the [address] [place] [address] since your last visit? \" No     3. For patients aged 39-70: Has the patient had a colonoscopy / FIT/ Cologuard? Yes - no Care Gap present      If the patient is female:    4. For patients aged 41-77: Has the patient had a mammogram within the past 2 years? NA - based on age or sex      11. For patients aged 21-65: Has the patient had a pap smear?  NA - based on age or sex

## 2022-12-14 ENCOUNTER — HOSPITAL ENCOUNTER (OUTPATIENT)
Dept: LAB | Age: 67
Discharge: HOME | End: 2022-12-14

## 2022-12-14 ENCOUNTER — VIRTUAL VISIT (OUTPATIENT)
Dept: FAMILY MEDICINE CLINIC | Age: 67
End: 2022-12-14
Payer: COMMERCIAL

## 2022-12-14 DIAGNOSIS — R53.81 MALAISE AND FATIGUE: ICD-10-CM

## 2022-12-14 DIAGNOSIS — R53.83 MALAISE AND FATIGUE: ICD-10-CM

## 2022-12-14 DIAGNOSIS — R00.2 PALPITATIONS: Primary | ICD-10-CM

## 2022-12-14 DIAGNOSIS — E07.9 THYROID DISORDER: ICD-10-CM

## 2022-12-14 DIAGNOSIS — I10 ESSENTIAL HYPERTENSION: ICD-10-CM

## 2022-12-14 DIAGNOSIS — R73.03 PREDIABETES: ICD-10-CM

## 2022-12-14 DIAGNOSIS — I95.1 ORTHOSTATIC HYPOTENSION: ICD-10-CM

## 2022-12-14 DIAGNOSIS — U07.1 COVID-19: ICD-10-CM

## 2022-12-14 LAB — SENTARA SPECIMEN COL,SENBCF: NORMAL

## 2022-12-14 PROCEDURE — 99001 SPECIMEN HANDLING PT-LAB: CPT

## 2022-12-14 PROCEDURE — 99214 OFFICE O/P EST MOD 30 MIN: CPT | Performed by: FAMILY MEDICINE

## 2022-12-14 PROCEDURE — 1123F ACP DISCUSS/DSCN MKR DOCD: CPT | Performed by: FAMILY MEDICINE

## 2022-12-14 NOTE — PROGRESS NOTES
Carl Velez is a 69 y.o. male who was seen by synchronous (real-time) audio-video technology on 12/14/2022 for Hypotension (Follow up)        Assessment & Plan:       ICD-10-CM ICD-9-CM    1. Palpitations  I04.2 163.5 METABOLIC PANEL, COMPREHENSIVE      CBC WITH AUTOMATED DIFF      MAGNESIUM      2. Thyroid disorder  E07.9 246.9 TSH 3RD GENERATION      T4, FREE      3. Essential hypertension  Q68 117.3 METABOLIC PANEL, COMPREHENSIVE      CBC WITH AUTOMATED DIFF      4. COVID-19  U07.1 079.89       5. Malaise and fatigue  R53.81 780.79     R53.83        6. Orthostatic hypotension  I95.1 458.0       7. Prediabetes  R73.03 790.29         Subjective:   Carl Velez is seen for follow-up care. Palpitations: Patient has palpitations that come on and off. He has previous episode of presyncope. Continues to have dizziness that comes on and off. Denies chest pain, shortness of breath or diaphoresis. He also denies ringing in the ears or hearing loss. Previously had labs checked after syncopal episode and these were stable. He is due to see the cardiologist next week. He may benefit from Holter monitoring. I will recheck labs given persistence of symptoms. Malaise and fatigue: Patient has chronic malaise and fatigue. He has not been able to get back to work due to the persistent fatigue. States that this comes on and off probably every 2-3 hours. He gets profoundly weak and not able to be active. We will recheck labs. Patient states that this symptoms started after he had COVID-19 infection. He wonders about post-COVID illness. We discussed supportive care measures. Orthostatic hypotension: Patient was seen previously with orthostatic hypotension. He is due to see the cardiologist.  Has not been checking his blood pressure regularly. He however has not passed out or lost consciousness though he occasionally feels lightheaded and dizzy.   We discussed maintaining good hydration status and adequate fluid intake. He is due to follow-up with a cardiologist.  Should keep a blood pressure log. Prediabetes: Patient has prediabetes with last HbA1c of 5.7. He will intensify lifestyle and dietary modification. HTN: Patient has hypertension. Patient is on amlodipine and lisinopril. Denies headache, changes in vision or focal weakness. We will continue current treatment plan. Prior to Admission medications    Medication Sig Start Date End Date Taking? Authorizing Provider   lisinopriL (PRINIVIL, ZESTRIL) 40 mg tablet take 1 tablet by mouth once daily 11/15/22  Yes Luther Hazel MD   amLODIPine (NORVASC) 5 mg tablet take 2 tablets by mouth once daily 9/21/22  Yes Alea Youssef MD   aspirin 81 mg tablet Take 81 mg by mouth daily. Yes Provider, Historical   MULTIVITAMIN PO Take 1 Tablet by mouth daily. Yes Provider, Historical   triamcinolone acetonide (KENALOG) 0.1 % ointment Apply  to affected area as needed for Skin Irritation. use thin layer  Patient not taking: No sig reported    Provider, Historical   hydroCHLOROthiazide (HYDRODIURIL) 25 mg tablet Take 25 mg by mouth daily. Patient not taking: Reported on 12/14/2022    Provider, Historical   permethrin (ACTICIN) 5 % topical cream apply sparingly as directed, Apply to entire body; leave on for 8 to 14 hours before washing off with water  Patient not taking: No sig reported 9/28/22   Luther Vazquez MD   atorvastatin (LIPITOR) 20 mg tablet Take 1 Tablet by mouth daily. Patient taking differently: Take 10 mg by mouth every other day. 5/23/22   Luther Vazquez MD     ROS Review of all systems is negative except as noted above in the HPI.     Objective:     Patient-Reported Vitals 12/14/2022   Patient-Reported Weight 160lb   Constitutional: [x] Appears well-developed and well-nourished [x] No apparent distress     Mental status: [x] Alert and awake  [x] Oriented to person/place/time [x] Able to follow commands     HENT: [x] Normocephalic, atraumatic     Pulmonary/Chest: [x] Respiratory effort normal   [x] No visualized signs of difficulty breathing or respiratory distress            Neurological:        [x] No Facial Asymmetry (Cranial nerve 7 motor function) (limited exam due to video visit)                      Psychiatric:       [x] Normal Affect     We discussed the expected course, resolution and complications of the diagnosis(es) in detail. Medication risks, benefits, costs, interactions, and alternatives were discussed as indicated. I advised him to contact the office if his condition worsens, changes or fails to improve as anticipated. He expressed understanding with the diagnosis(es) and plan. Kimberlee [place], was evaluated through a synchronous (real-time) audio-video encounter. The patient (or guardian if applicable) is aware that this is a billable service, which includes applicable co-pays. This Virtual Visit was conducted with patient's (and/or legal guardian's) consent. The visit was conducted pursuant to the emergency declaration under the [address] [place] [address] authority and the [employer] Act. Patient identification was verified, and a caregiver was present when appropriate. The patient was located at: Home: [address] [place] [address]  The provider was located at:  Facility (Appt Department): [address] MD

## 2022-12-16 NOTE — PROGRESS NOTES
After obtaining identifiers patient was advised of all lab results and recommendations.   Patient verbalized understanding

## 2022-12-21 ENCOUNTER — OFFICE VISIT (OUTPATIENT)
Dept: CARDIOLOGY CLINIC | Age: 67
End: 2022-12-21
Payer: COMMERCIAL

## 2022-12-21 VITALS
OXYGEN SATURATION: 100 % | WEIGHT: 168 LBS | BODY MASS INDEX: 26.37 KG/M2 | HEART RATE: 77 BPM | SYSTOLIC BLOOD PRESSURE: 162 MMHG | HEIGHT: 67 IN | DIASTOLIC BLOOD PRESSURE: 82 MMHG

## 2022-12-21 DIAGNOSIS — I95.1 ORTHOSTASIS: ICD-10-CM

## 2022-12-21 DIAGNOSIS — I10 PRIMARY HYPERTENSION: Primary | ICD-10-CM

## 2022-12-21 NOTE — PROGRESS NOTES
Shanna Martinez presents today for   Chief Complaint   Patient presents with    Follow-up     1 year    Palpitations     fluttering    Shortness of Breath     exertion       Shanna Mannblanca preferred language for health care discussion is english/other. Is someone accompanying this pt? no    Is the patient using any DME equipment during [address]? no    Depression Screening:  3 most recent PHQ Screens 12/21/2022   Little interest or pleasure in doing things Not at all   Feeling down, depressed, irritable, or hopeless Not at all   Total Score PHQ 2 0   Trouble falling or staying asleep, or sleeping too much -   Feeling tired or having little energy -   Poor appetite, weight loss, or overeating -   Feeling bad about yourself - or that you are a failure or have let yourself or your family down -   Trouble concentrating on things such as school, work, reading, or watching TV -   Moving or speaking so slowly that other people could have noticed; or the opposite being so fidgety that others notice -   Thoughts of being better off dead, or hurting yourself in some way -   PHQ 9 Score -   How difficult have these problems made it for you to do your work, take care of your home and get along with others -       Learning Assessment:  Learning Assessment 12/21/2022   PRIMARY LEARNER Patient   HIGHEST LEVEL OF EDUCATION - PRIMARY LEARNER  -   BARRIERS PRIMARY LEARNER -     -   CO-LEARNER CAREGIVER -   PRIMARY LANGUAGE ENGLISH   LEARNER PREFERENCE PRIMARY DEMONSTRATION     -   ANSWERED BY patient   RELATIONSHIP SELF       Abuse Screening:  Abuse Screening Questionnaire 12/21/2022   Do you ever feel afraid of your partner? N   Are you in a relationship with someone who physically or mentally threatens you? N   Is it safe for you to go home? Y       Fall Risk  Fall Risk Assessment, last 12 mths 12/21/2022   Able to walk? Yes   Fall in past 12 months? 0   Do you feel unsteady?  0   Are you worried about falling 0   Is TUG test greater than 12 seconds? -   Is the gait abnormal? -   Number of falls in past 12 months -   Fall with injury? -           Pt currently taking Anticoagulant therapy? no    Pt currently taking Antiplatelet therapy ? Aspirin 81 mg daily      Coordination of Care:  1. Have you been to the ER, urgent care clinic since your last visit? Hospitalized since your last visit? no    2. Have you seen or consulted any other health care providers outside of the [address] [place] [address] since your last visit? Include any pap smears or colon screening.  no

## 2022-12-21 NOTE — PROGRESS NOTES
History of Present Illness:  69 YOM here for follow up. Last time I saw him was in September 2020 for very high blood pressure. He has had a few episodes of syncope, once when out working in the garage and one around the time of colonoscopy. They appear to be orthostatic when he gets up too fast.  He was admitted to the hospital about a month ago. Echocardiogram was unremarkable. Stroke was ruled out. His blood pressure medications were decreased. He works as a  and is planning to retire soon. Impression:  Labile blood pressure with multiple episodes of syncope due to orthostasis, recently admitted to the hospital.  Recent MRI of brain, as well as carotids, and echo unremarkable November 2022. Remote hepatitis C treatment. History of BPH. Plan:  He has very labile blood pressure with intermittent orthostasis. I would allow his blood pressure to run a little high between 120-160 mmHg. I have asked him to get a blood pressure cuff at home and monitor and I will see him back in six weeks. 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 He is planning to retire in the coming 1-2 months. All questions answered. Past Medical History:   Diagnosis Date    BPH (benign prostatic hypertrophy) 10/15/2012    COVID-19     ED (erectile dysfunction) 09/30/2011    Hepatitis C     HTN (hypertension) 09/30/2011    Hyperlipidemia 09/30/2011       Current Outpatient Medications   Medication Sig Dispense Refill    lisinopriL (PRINIVIL, ZESTRIL) 40 mg tablet take 1 tablet by mouth once daily 90 Tablet 3    amLODIPine (NORVASC) 5 mg tablet take 2 tablets by mouth once daily 180 Tablet 1    atorvastatin (LIPITOR) 20 mg tablet Take 1 Tablet by mouth daily. (Patient taking differently: Take 10 mg by mouth every other day.) 30 Tablet 3    aspirin 81 mg tablet Take 81 mg by mouth daily.         MULTIVITAMIN PO Take 1 Tablet by mouth daily. Social History   reports that he has quit smoking. He has never used smokeless tobacco.   reports current alcohol use of about 1.0 standard drink per week. Family History  family history includes Alcohol abuse in his father; Hypertension in his brother, brother, father, and mother. Review of Systems  Except as stated above include:  Constitutional: Negative for fever, chills and malaise/fatigue. HEENT: No congestion or recent URI. Gastrointestinal: No nausea, vomiting, abdominal pain, bloody stools. Pulmonary:  Negative except as stated above. Cardiac:  Negative except as stated above. Musculoskeletal: Negative except as stated above. Neurological:  No localized symptoms. Skin:  Negative except as stated above. Psych:  Negative except as stated above. Endocrine:  Negative except as stated above. PHYSICAL EXAM  BP Readings from Last 3 Encounters:   12/21/22 (!) 162/82   11/02/22 133/76   10/04/22 (!) 151/69     Pulse Readings from Last 3 Encounters:   12/21/22 77   11/02/22 81   10/04/22 96     Wt Readings from Last 3 Encounters:   12/21/22 76.2 kg (168 lb)   11/01/22 72.6 kg (160 lb)   10/04/22 73 kg (161 lb)     General:   Well developed, well groomed. Head/Neck:   No obvious jugular venous distention     No obvious carotid pulsations. No evidence of xanthelasma. Lungs:   No respiratory distress. Clear bilaterally. Heart:  Regular rate and rhythm. Normal S1/S2. Palpation grossly normal.    No significant murmurs, rubs or gallops. Abdomen:   Non-acute abdomen. No obvious pulsations. Extremities:   Intact peripheral pulses. No significant edema. Neurological:   Alert and oriented to person, place, time. No focal neurological deficit visually. Skin:   No obvious rash    Blood Pressure Metric:  Monitor recommended and adjustments stated if needed.

## 2023-01-05 ENCOUNTER — OFFICE VISIT (OUTPATIENT)
Dept: FAMILY MEDICINE CLINIC | Age: 68
End: 2023-01-05
Payer: COMMERCIAL

## 2023-01-05 VITALS
OXYGEN SATURATION: 100 % | WEIGHT: 155 LBS | SYSTOLIC BLOOD PRESSURE: 164 MMHG | HEART RATE: 84 BPM | DIASTOLIC BLOOD PRESSURE: 78 MMHG | BODY MASS INDEX: 24.33 KG/M2 | HEIGHT: 67 IN | TEMPERATURE: 98.1 F | RESPIRATION RATE: 20 BRPM

## 2023-01-05 DIAGNOSIS — R00.2 PALPITATIONS: ICD-10-CM

## 2023-01-05 DIAGNOSIS — I10 ESSENTIAL HYPERTENSION: Primary | ICD-10-CM

## 2023-01-05 DIAGNOSIS — E78.5 DYSLIPIDEMIA: ICD-10-CM

## 2023-01-05 DIAGNOSIS — U07.1 COVID-19: ICD-10-CM

## 2023-01-05 DIAGNOSIS — I95.1 ORTHOSTATIC HYPOTENSION: ICD-10-CM

## 2023-01-05 PROCEDURE — 1123F ACP DISCUSS/DSCN MKR DOCD: CPT | Performed by: FAMILY MEDICINE

## 2023-01-05 PROCEDURE — 99214 OFFICE O/P EST MOD 30 MIN: CPT | Performed by: FAMILY MEDICINE

## 2023-01-05 PROCEDURE — 3078F DIAST BP <80 MM HG: CPT | Performed by: FAMILY MEDICINE

## 2023-01-05 PROCEDURE — 3077F SYST BP >= 140 MM HG: CPT | Performed by: FAMILY MEDICINE

## 2023-01-05 RX ORDER — PERMETHRIN 50 MG/G
CREAM TOPICAL
COMMUNITY
Start: 2022-12-21

## 2023-01-05 NOTE — LETTER
NOTIFICATION RETURN TO WORK     1/5/2023 11:29 AM    Mr. Charmayne Pippins  [address] [place] [address]      To Whom It May Concern:    Charmayne Pippins is currently under the care of [address] [place] [address]. He has a medical condition and is not able to go back to work. He has been seen by the cardiologist and this is the recommendation. He is thus unable to work from now until May 1st and beyond. If there are questions or concerns please have the patient contact our office.         Sincerely,      Lilli Cordova MD

## 2023-01-05 NOTE — PROGRESS NOTES
1. \"Have you been to the ER, urgent care clinic since your last visit? Hospitalized since your last visit? \" No    2. \"Have you seen or consulted any other health care providers outside of the [address] [place] [address] since your last visit? \" No     3. For patients aged 39-70: Has the patient had a colonoscopy / FIT/ Cologuard? Yes - no Care Gap present      If the patient is female:    4. For patients aged 41-77: Has the patient had a mammogram within the past 2 years? NA - based on age or sex      11. For patients aged 21-65: Has the patient had a pap smear?  NA - based on age or sex

## 2023-01-05 NOTE — PROGRESS NOTES
OLIVIA  Bina Barnett comes in for follow up care. HTN: Patient has hypertension. Blood pressure is elevated. He is on amlodipine 10 mg daily and lisinopril 40 mg daily. He is also being seen by his cardiologist.  He is keeping a blood pressure log. Systolic numbers are usually in the 140s. We discussed adjustment of medications. Patient is hesitant to do this at the moment. Would like to discuss with his cardiologist also. He will take a low-sodium diet. I will follow-up at next visit. He denies headache, changes in vision or focal weakness. Knee pain and swelling: Patient has right knee pain and swelling. There is no erythema or redness of the knee. Swelling is localized at the prepatellar bursa area. This is bursitis right knee. Swelling is anterior aspect of the knee. It is fluctuant. Discussed supportive care. He has been icing the area. He will also take anti-inflammatory. I will have him take ibuprofen. We will follow-up in case of improvement or worsening symptoms. Dyslipidemia: Patient has dyslipidemia. He is on Lipitor. Stable on medication. We will continue current treatment plan. Orthostatic hypotension: Patient has a history of orthostatic hypotension. He has episodes of passing out. He has been followed up by the cardiologist.  Currently plan is to try and control his blood pressure. He is not able to get back to work. Has been seen by the cardiologist and this recommendation has been made. He would like a letter stating that he is not able to get back to work at least until through May 1. This letter is written. Discussed maintenance of adequate fluid intake and hydration status. COVID-19: Patient was recently diagnosed with COVID-19. He has improved symptomatically. No fever or chills. No cough or wheeze but he still has malaise and fatigue. Occasionally feels shortness of breath. He will continue to do supportive care.       Past Medical History  Past Medical History:   Diagnosis Date    BPH (benign prostatic hypertrophy) 10/15/2012    COVID-19     ED (erectile dysfunction) 09/30/2011    Hepatitis C     HTN (hypertension) 09/30/2011    Hyperlipidemia 09/30/2011       Surgical History  Past Surgical History:   Procedure Laterality Date    HX ACL RECONSTRUCTION      HX COLONOSCOPY          Medications  Current Outpatient Medications   Medication Sig Dispense Refill    permethrin (ACTICIN) 5 % topical cream APPLY SPARINGLY AS DIRECTED, APPLY TO ENTIRE BODY, LEAVE ON FOR 8. ..  (REFER TO PRESCRIPTION NOTES). lisinopriL (PRINIVIL, ZESTRIL) 40 mg tablet take 1 tablet by mouth once daily 90 Tablet 3    amLODIPine (NORVASC) 5 mg tablet take 2 tablets by mouth once daily 180 Tablet 1    atorvastatin (LIPITOR) 20 mg tablet Take 1 Tablet by mouth daily. (Patient taking differently: Take 10 mg by mouth every other day.) 30 Tablet 3    aspirin 81 mg tablet Take 81 mg by mouth daily. MULTIVITAMIN PO Take 1 Tablet by mouth daily. Allergies  Allergies   Allergen Reactions    Sean [Amlodipine-Olmesartan] Palpitations and Other (comments)     Fatigue, headache, pt states \"kidneys hurt\" (tolerates amlodipine alone)    Atenolol Rash    Miralax [Polyethylene Glycol 3350] Swelling       Family History  Family History   Problem Relation Age of Onset    Hypertension Father     Alcohol abuse Father     Hypertension Mother     Hypertension Brother     Hypertension Brother        Social History  Social History     Socioeconomic History    Marital status:      Spouse name: Not on file    Number of children: Not on file    Years of education: Not on file    Highest education level: Not on file   Occupational History    Not on file   Tobacco Use    Smoking status: Former    Smokeless tobacco: Never   Vaping Use    Vaping Use: Never used   Substance and Sexual Activity    Alcohol use: Yes     Alcohol/week: 1.0 standard drink     Types: 1 Cans of beer per week    Drug use:  No Sexual activity: Yes   Other Topics Concern    Not on file   Social History Narrative    Not on file     Social Determinants of Health     Financial Resource Strain: Not on file   Food Insecurity: Not on file   Transportation Needs: Not on file   Physical Activity: Not on file   Stress: Not on file   Social Connections: Not on file   Intimate Partner Violence: Not on file   Housing Stability: Not on file       Review of Systems  Review of Systems - Review of all systems is negative except as noted above in the HPI. Vital Signs  Visit Vitals  BP (!) 164/78   Pulse 84   Temp 98.1 °F (36.7 °C)   Resp 20   Ht 5' 7\" (1.702 m)   Wt 155 lb (70.3 kg)   SpO2 100%   BMI 24.28 kg/m²         Physical Exam  Physical Examination: General appearance - alert, well appearing, and in no distress, oriented to person, place, and time, and acyanotic, in no respiratory distress  Mental status - affect appropriate to mood  Neck - supple, no significant adenopathy  Lymphatics - no palpable lymphadenopathy  Chest - no tachypnea, retractions or cyanosis, decreased air entry noted bilateral lung bases  Heart - S1 and S2 normal  Abdomen - no rebound tenderness noted  Back exam - limited range of motion  Neurological - abnormal neurological exam unchanged from prior examinations  Musculoskeletal - osteoarthritic changes noted in both hands, swelling anterior aspect right knee that is fluctuant, not tender, not warm or erythematous. Extremities - intact peripheral pulses      Results  Results for orders placed or performed during the hospital encounter of 12/14/22   [address] [place] [address]. Result Value Ref Range    [facility] SPECIMEN COL Specimens collected/sent to [facility]         ASSESSMENT and PLAN    ICD-10-CM ICD-9-CM    1. Essential hypertension  I10 401.9       2. Orthostatic hypotension  I95.1 458.0       3. Palpitations  R00.2 785.1       4.  Dyslipidemia  E78.5 272.4       5. COVID-19  U07.1 079.89         lab results and schedule of future lab studies reviewed with patient  reviewed diet, exercise and weight control  cardiovascular risk and specific lipid/LDL goals reviewed  reviewed medications and side effects in detail  I spent 30 minutes with this established patient. I have discussed the diagnosis with the patient and the intended plan of care as seen in the above orders. The patient has received an after-visit summary and questions were answered concerning future plans. I have discussed medication, side effects, and warnings with the patient in detail. The patient verbalized understanding and is in agreement with the plan of care. The patient will contact the office with any additional concerns. Ibis Barkley MD    PLEASE NOTE:   This document has been produced using voice recognition software.  Unrecognized errors in transcription may be present

## 2023-02-02 ENCOUNTER — OFFICE VISIT (OUTPATIENT)
Dept: CARDIOLOGY CLINIC | Age: 68
End: 2023-02-02
Payer: COMMERCIAL

## 2023-02-02 VITALS
DIASTOLIC BLOOD PRESSURE: 86 MMHG | HEART RATE: 91 BPM | WEIGHT: 166 LBS | OXYGEN SATURATION: 98 % | SYSTOLIC BLOOD PRESSURE: 180 MMHG | BODY MASS INDEX: 26.06 KG/M2 | HEIGHT: 67 IN

## 2023-02-02 DIAGNOSIS — I95.1 ORTHOSTASIS: Primary | ICD-10-CM

## 2023-02-02 DIAGNOSIS — E78.5 HYPERLIPIDEMIA, UNSPECIFIED HYPERLIPIDEMIA TYPE: ICD-10-CM

## 2023-02-02 DIAGNOSIS — I10 PRIMARY HYPERTENSION: ICD-10-CM

## 2023-02-02 DIAGNOSIS — R55 SYNCOPE, UNSPECIFIED SYNCOPE TYPE: ICD-10-CM

## 2023-02-02 PROCEDURE — 3077F SYST BP >= 140 MM HG: CPT | Performed by: INTERNAL MEDICINE

## 2023-02-02 PROCEDURE — 1123F ACP DISCUSS/DSCN MKR DOCD: CPT | Performed by: INTERNAL MEDICINE

## 2023-02-02 PROCEDURE — 99214 OFFICE O/P EST MOD 30 MIN: CPT | Performed by: INTERNAL MEDICINE

## 2023-02-02 PROCEDURE — 3079F DIAST BP 80-89 MM HG: CPT | Performed by: INTERNAL MEDICINE

## 2023-02-02 RX ORDER — CARVEDILOL 3.12 MG/1
3.12 TABLET ORAL 2 TIMES DAILY WITH MEALS
Qty: 60 TABLET | Refills: 3 | Status: SHIPPED | OUTPATIENT
Start: 2023-02-02

## 2023-02-02 NOTE — PROGRESS NOTES
Margalinacarissa Albarran presents today for   Chief Complaint   Patient presents with    Follow-up     6 weeks        Diana Albarran preferred language for health care discussion is english/other. Is someone accompanying this pt? no    Is the patient using any DME equipment during [address]? no    Depression Screening:  3 most recent PHQ Screens 1/5/2023   Little interest or pleasure in doing things Not at all   Feeling down, depressed, irritable, or hopeless Not at all   Total Score PHQ 2 0   Trouble falling or staying asleep, or sleeping too much Not at all   Feeling tired or having little energy Not at all   Poor appetite, weight loss, or overeating Not at all   Feeling bad about yourself - or that you are a failure or have let yourself or your family down Not at all   Trouble concentrating on things such as school, work, reading, or watching TV Not at all   Moving or speaking so slowly that other people could have noticed; or the opposite being so fidgety that others notice Not at all   Thoughts of being better off dead, or hurting yourself in some way Not at all   PHQ 9 Score 0   How difficult have these problems made it for you to do your work, take care of your home and get along with others Not difficult at all       Learning Assessment:  Learning Assessment 12/21/2022   PRIMARY LEARNER Patient   HIGHEST LEVEL OF EDUCATION - PRIMARY LEARNER  -   BARRIERS PRIMARY LEARNER -     -   CO-LEARNER CAREGIVER -   PRIMARY LANGUAGE ENGLISH   LEARNER PREFERENCE PRIMARY DEMONSTRATION     -   ANSWERED BY patient   RELATIONSHIP SELF       Abuse Screening:  Abuse Screening Questionnaire 12/21/2022   Do you ever feel afraid of your partner? N   Are you in a relationship with someone who physically or mentally threatens you? N   Is it safe for you to go home? Y       Fall Risk  Fall Risk Assessment, last 12 mths 1/5/2023   Able to walk? Yes   Fall in past 12 months? 0   Do you feel unsteady?  0   Are you worried about falling 0   Is TUG test greater than 12 seconds? -   Is the gait abnormal? -   Number of falls in past 12 months -   Fall with injury? -       Pt currently taking Anticoagulant therapy? ASA 81mg every day     Coordination of Care:  1. Have you been to the ER, urgent care clinic since your last visit? Hospitalized since your last visit? no    2. Have you seen or consulted any other health care providers outside of the [address] [place] [address] since your last visit? Include any pap smears or colon screening.  no

## 2023-02-02 NOTE — PROGRESS NOTES
History of Present Illness:  69 YOM here for follow up. When I saw him in December his blood pressure was still high, but labile. 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 He had an issue with significant orthostasis when getting up too fast and was admitted to the hospital in November. His medications were decreased at this time. His home systolic blood pressure is 140-150 mmHg quite consistently and no low numbers. No new chest pain, dyspnea, PND, orthopnea, edema or syncope. Impression:  History of syncope, likely due to orthostasis and vagal November 2022, recently admitted to hospital, but no recurrence. History of MRI scan, as well as carotids and echocardiogram, unremarkable November 2022. Remote hepatitis C treatment  History of BPH. Plan:  Home systolic blood pressure is still running high, mostly 140s. I do not want to use a diuretic given his propensity for orthostasis. I would like to avoid nitrates and Hydralazine as well as this may worsen his orthostasis. I am going to start low dose Coreg 3.125 mg twice daily and ask him to keep an eye on his blood pressure and check it 2-3 times a week. I will see back in three months and we will continue to up-titrate Coreg as long as he does not have any significant hypotensive events. All questions answered. Wt Readings from Last 3 Encounters:   02/02/23 75.3 kg (166 lb)   01/05/23 70.3 kg (155 lb)   12/21/22 76.2 kg (168 lb)     Past Medical History:   Diagnosis Date    BPH (benign prostatic hypertrophy) 10/15/2012    COVID-19     ED (erectile dysfunction) 09/30/2011    Hepatitis C     HTN (hypertension) 09/30/2011    Hyperlipidemia 09/30/2011       Current Outpatient Medications   Medication Sig Dispense Refill    permethrin (ACTICIN) 5 % topical cream APPLY SPARINGLY AS DIRECTED, APPLY TO ENTIRE BODY, LEAVE ON FOR 8. ..  (REFER TO PRESCRIPTION NOTES).       lisinopriL (PRINIVIL, ZESTRIL) 40 mg tablet take 1 tablet by mouth once daily 90 Tablet 3    amLODIPine (NORVASC) 5 mg tablet take 2 tablets by mouth once daily 180 Tablet 1    atorvastatin (LIPITOR) 20 mg tablet Take 1 Tablet by mouth daily. (Patient taking differently: Take 10 mg by mouth every other day.) 30 Tablet 3    aspirin 81 mg tablet Take 81 mg by mouth daily. MULTIVITAMIN PO Take 1 Tablet by mouth daily. Social History   reports that he has quit smoking. He has never used smokeless tobacco.   reports current alcohol use of about 1.0 standard drink per week. Family History  family history includes Alcohol abuse in his father; Hypertension in his brother, brother, father, and mother. Review of Systems  Except as stated above include:  Constitutional: Negative for fever, chills and malaise/fatigue. HEENT: No congestion or recent URI. Gastrointestinal: No nausea, vomiting, abdominal pain, bloody stools. Pulmonary:  Negative except as stated above. Cardiac:  Negative except as stated above. Musculoskeletal: Negative except as stated above. Neurological:  No localized symptoms. Skin:  Negative except as stated above. Psych:  Negative except as stated above. Endocrine:  Negative except as stated above. PHYSICAL EXAM  BP Readings from Last 3 Encounters:   02/02/23 (!) 180/86   01/05/23 (!) 164/78   12/21/22 (!) 162/82     Pulse Readings from Last 3 Encounters:   02/02/23 91   01/05/23 84   12/21/22 77       General:   Well developed, well groomed. Head/Neck:   No obvious jugular venous distention     No obvious carotid pulsations. No evidence of xanthelasma. Lungs:   No respiratory distress. Clear bilaterally. Heart:  Regular rate and rhythm. Normal S1/S2. Palpation grossly normal.    No significant murmurs, rubs or gallops. Abdomen:   Non-acute abdomen. No obvious pulsations. Extremities:   Intact peripheral pulses. No significant edema. Neurological:   Alert and oriented to person, place, time.       No focal neurological deficit visually. Skin:   No obvious rash    Blood Pressure Metric:  Monitor recommended and adjustments stated if needed.

## 2023-02-06 ENCOUNTER — TELEPHONE (OUTPATIENT)
Dept: CARDIOLOGY CLINIC | Age: 68
End: 2023-02-06

## 2023-02-06 DIAGNOSIS — E07.9 THYROID DISORDER: ICD-10-CM

## 2023-02-06 DIAGNOSIS — R00.2 PALPITATIONS: ICD-10-CM

## 2023-02-06 DIAGNOSIS — I10 ESSENTIAL HYPERTENSION: ICD-10-CM

## 2023-02-06 NOTE — TELEPHONE ENCOUNTER
Patient called and states that you started him or Coreg and he states that he doesn't want to take it since he has Gout and his pamphlet states not to take Coreg while taking anti-inflammation medications and colchicine, please advise.

## 2023-02-06 NOTE — TELEPHONE ENCOUNTER
Radha Jimenez MD  You 5 hours ago (11:14 AM)     [address] [place] [address], let him know I am limited with medications. He can followup with his pmd to discuss alternatives.  Jaya

## 2023-02-06 NOTE — TELEPHONE ENCOUNTER
Called and spoke with patient, advised to follow up with PCP for alternate therapy. Patient states understanding and will contact PCP.

## 2023-02-16 ENCOUNTER — TELEPHONE (OUTPATIENT)
Facility: CLINIC | Age: 68
End: 2023-02-16

## 2023-02-16 RX ORDER — TRIAMCINOLONE ACETONIDE 1 MG/G
CREAM TOPICAL
Qty: 453 G | Refills: 0 | Status: SHIPPED | OUTPATIENT
Start: 2023-02-16 | End: 2023-02-24

## 2023-02-16 NOTE — TELEPHONE ENCOUNTER
Pt requesting for medication to be refilled:    Triamcinolone Acetonide Cream USP 0.1%  [facility] on [facility].  [place], [place]

## 2023-03-07 ENCOUNTER — OFFICE VISIT (OUTPATIENT)
Facility: CLINIC | Age: 68
End: 2023-03-07

## 2023-03-07 ENCOUNTER — TELEPHONE (OUTPATIENT)
Facility: CLINIC | Age: 68
End: 2023-03-07

## 2023-03-07 VITALS
SYSTOLIC BLOOD PRESSURE: 150 MMHG | RESPIRATION RATE: 18 BRPM | OXYGEN SATURATION: 100 % | HEIGHT: 67 IN | WEIGHT: 166 LBS | DIASTOLIC BLOOD PRESSURE: 74 MMHG | TEMPERATURE: 98 F | HEART RATE: 71 BPM | BODY MASS INDEX: 26.06 KG/M2

## 2023-03-07 DIAGNOSIS — I10 HYPERTENSION, UNSPECIFIED TYPE: Primary | ICD-10-CM

## 2023-03-07 DIAGNOSIS — E78.5 HYPERLIPIDEMIA, UNSPECIFIED HYPERLIPIDEMIA TYPE: ICD-10-CM

## 2023-03-07 DIAGNOSIS — M10.9 GOUT, UNSPECIFIED CAUSE, UNSPECIFIED CHRONICITY, UNSPECIFIED SITE: ICD-10-CM

## 2023-03-07 DIAGNOSIS — I95.1 ORTHOSTATIC HYPOTENSION: ICD-10-CM

## 2023-03-07 RX ORDER — ASPIRIN 81 MG/1
81 TABLET ORAL DAILY
COMMUNITY

## 2023-03-07 RX ORDER — PERMETHRIN 50 MG/G
CREAM TOPICAL
COMMUNITY
Start: 2023-01-11

## 2023-03-07 RX ORDER — NIFEDIPINE 30 MG/1
30 TABLET, EXTENDED RELEASE ORAL DAILY
Qty: 30 TABLET | Refills: 5 | Status: SHIPPED | OUTPATIENT
Start: 2023-03-07

## 2023-03-07 RX ORDER — COLCHICINE 0.6 MG/1
0.6 TABLET ORAL DAILY
Qty: 30 TABLET | Refills: 3 | Status: SHIPPED | OUTPATIENT
Start: 2023-03-07

## 2023-03-07 SDOH — ECONOMIC STABILITY: INCOME INSECURITY: HOW HARD IS IT FOR YOU TO PAY FOR THE VERY BASICS LIKE FOOD, HOUSING, MEDICAL CARE, AND HEATING?: NOT HARD AT ALL

## 2023-03-07 SDOH — ECONOMIC STABILITY: FOOD INSECURITY: WITHIN THE PAST 12 MONTHS, YOU WORRIED THAT YOUR FOOD WOULD RUN OUT BEFORE YOU GOT MONEY TO BUY MORE.: NEVER TRUE

## 2023-03-07 SDOH — ECONOMIC STABILITY: HOUSING INSECURITY
IN THE LAST 12 MONTHS, WAS THERE A TIME WHEN YOU DID NOT HAVE A STEADY PLACE TO SLEEP OR SLEPT IN A SHELTER (INCLUDING NOW)?: NO

## 2023-03-07 SDOH — ECONOMIC STABILITY: FOOD INSECURITY: WITHIN THE PAST 12 MONTHS, THE FOOD YOU BOUGHT JUST DIDN'T LAST AND YOU DIDN'T HAVE MONEY TO GET MORE.: NEVER TRUE

## 2023-03-07 ASSESSMENT — PATIENT HEALTH QUESTIONNAIRE - PHQ9
2. FEELING DOWN, DEPRESSED OR HOPELESS: 0
SUM OF ALL RESPONSES TO PHQ QUESTIONS 1-9: 0
1. LITTLE INTEREST OR PLEASURE IN DOING THINGS: 0
SUM OF ALL RESPONSES TO PHQ9 QUESTIONS 1 & 2: 0
SUM OF ALL RESPONSES TO PHQ QUESTIONS 1-9: 0

## 2023-03-07 ASSESSMENT — LIFESTYLE VARIABLES: HOW OFTEN DO YOU HAVE A DRINK CONTAINING ALCOHOL: NEVER

## 2023-03-07 NOTE — PROGRESS NOTES
KALPANA  Jack Michael comes in for follow-up care. Hypertension: Patient has hypertension. Blood pressure is elevated. Patient is on amlodipine and lisinopril. He had been given Coreg by the cardiologist but is apprehensive about taking the medication. We discussed management options. We will discontinue the amlodipine and start him on nifedipine 30 mg daily. He will continue with lisinopril 40 mg daily. He will keep a blood pressure log and I will follow-up at next visit. Gout arthritis: Patient has gout arthritis. He is on colchicine. Would like a refill of medication. He takes this only as needed for acute gout attack. We discussed about taking medication daily like allopurinol. Patient will prefer to hold off on this. He will continue to take a purine restricted diet. Dyslipidemia: Patient has dyslipidemia. Patient is on atorvastatin. Patient is stable on medication. Continue current treatment plan. Orthostatic hypotension: Patient has a history of orthostatic hypotension. He has occasional dizziness. Denies syncope or falls. He is doing supportive care. Continue current management plan. He has been followed up by the cardiologist.      Past Medical History  Past Medical History:   Diagnosis Date    BPH (benign prostatic hypertrophy) 10/15/2012    COVID-19     ED (erectile dysfunction) 09/30/2011    Hepatitis C     HTN (hypertension) 09/30/2011    Hyperlipidemia 09/30/2011       Surgical History  Past Surgical History:   Procedure Laterality Date    ANTERIOR CRUCIATE LIGAMENT REPAIR      COLONOSCOPY          Medications  Current Outpatient Medications   Medication Sig Dispense Refill    permethrin (ELIMITE) 5 % cream APPLY SPARINGLY AS DIRECTED, APPLY TO ENTIRE BODY, LEAVE ON FOR 8. ..  (REFER TO PRESCRIPTION NOTES).       aspirin 81 MG EC tablet Take 81 mg by mouth daily      Multiple Vitamin (MULTIVITAMIN PO) Take 1 tablet by mouth daily      amLODIPine (NORVASC) 5 MG tablet take 2 tablets by mouth once daily      atorvastatin (LIPITOR) 20 MG tablet Take 20 mg by mouth daily      lisinopril (PRINIVIL;ZESTRIL) 40 MG tablet take 1 tablet by mouth once daily       No current facility-administered medications for this visit. Allergies  Allergies   Allergen Reactions    Amlodipine-Olmesartan Other (See Comments) and Palpitations     Fatigue, headache, pt states \"kidneys hurt\" (tolerates amlodipine alone)    Polyethylene Glycol 3350 Swelling    Atenolol Rash       Family History  Family History   Problem Relation Age of Onset    Hypertension Mother     Alcohol Abuse Father     Hypertension Father     Hypertension Brother     Hypertension Brother        Social History  Social History     Socioeconomic History    Marital status:      Spouse name: Not on file    Number of children: Not on file    Years of education: Not on file    Highest education level: Not on file   Occupational History    Not on file   Tobacco Use    Smoking status: Former    Smokeless tobacco: Never   Substance and Sexual Activity    Alcohol use: Yes     Alcohol/week: 1.0 standard drink    Drug use: No    Sexual activity: Not on file   Other Topics Concern    Not on file   Social History Narrative    Not on file     Social Determinants of Health     Financial Resource Strain: Low Risk     Difficulty of Paying Living Expenses: Not hard at all   Food Insecurity: No Food Insecurity    Worried About [facility] in the Last Year: Never true    [address] [facility] [address] in the Last Year: Never true   Transportation Needs: Unknown    Lack of Transportation (Medical): Not on file    Lack of Transportation (Non-Medical):  No   Physical Activity: Sufficiently Active    Days of Exercise per Week: 7 days    Minutes of Exercise per Session: 60 min   Stress: Not on file   Social Connections: Not on file   Intimate Partner Violence: Not on file   Housing Stability: Unknown    Unable to Pay for Housing in the Last Year: Not on file Number of Places Lived in the Last Year: Not on file    Unstable Housing in the Last Year: No       Review of Systems  Review of Systems - Review of all systems is negative except as noted above in the HPI.    Vital Signs  BP (!) 150/74   Pulse 71   Temp 98 °F (36.7 °C) (Temporal)   Resp 18   Ht 5' 7\" (1.702 m)   Wt 166 lb (75.3 kg)   SpO2 100%   BMI 26.00 kg/m²       Physical Exam  Physical Examination: General appearance - oriented to person, place, and time and acyanotic, in no respiratory distress  Mental status - alert, oriented to person, place, and time  Chest - no tachypnea, retractions or cyanosis  Heart - S1 and S2 normal  Abdomen - no rebound tenderness noted  Back exam - limited range of motion  Neurological - normal muscle tone, no tremors, strength 5/5  Musculoskeletal - osteoarthritic changes noted in both hands  Extremities - no pedal edema noted        Results  No results found for this visit on 03/07/23.    ASSESSMENT and PLAN   Diagnosis Orders   1. Hypertension, unspecified type  NIFEdipine (PROCARDIA XL) 30 MG extended release tablet      2. Gout, unspecified cause, unspecified chronicity, unspecified site  colchicine (COLCRYS) 0.6 MG tablet      3. Hyperlipidemia, unspecified hyperlipidemia type        4. Orthostatic hypotension          lab results and schedule of future lab studies reviewed with patient  reviewed diet, exercise and weight control  cardiovascular risk and specific lipid/LDL goals reviewed  reviewed medications and side effects in detail      I have discussed the diagnosis with the patient and the intended plan of care as seen in the above orders. The patient has received an after-visit summary and questions were answered concerning future plans. I have discussed medication, side effects, and warnings with the patient in detail. The patient verbalized understanding and is in agreement with the plan of care. The patient will contact the office with any additional  concerns. Lu Markham MD    PLEASE NOTE:   This document has been produced using voice recognition software.  Unrecognized errors in transcription may be present

## 2023-03-07 NOTE — TELEPHONE ENCOUNTER
The pharmacy faxed over for the following:    Colchicine 0.6 mg tablets  QTY: 30  Take 1 tablet by mouth daily. MESSAGE: PLASMA CONCENTRATIONS AND PHARMACOLOGIC EFFECTS OF COLCHICINE MAY BE INCREASED BY P-GP INHIBITORS  (EG, CARVEDILOL ORAL TABLET 3.125 MG). SEVERE TOXICITY MAY OCCUR.  A COLCHICINE DOSING ADJUSTMENT IS REQUIRED FOR PATIENTS ALSO RECEIVING CARVEDILOL ORAL

## 2023-03-12 ASSESSMENT — PATIENT HEALTH QUESTIONNAIRE - PHQ9
SUM OF ALL RESPONSES TO PHQ QUESTIONS 1-9: 0
1. LITTLE INTEREST OR PLEASURE IN DOING THINGS: 0
SUM OF ALL RESPONSES TO PHQ QUESTIONS 1-9: 0
SUM OF ALL RESPONSES TO PHQ9 QUESTIONS 1 & 2: 0
SUM OF ALL RESPONSES TO PHQ QUESTIONS 1-9: 0
SUM OF ALL RESPONSES TO PHQ QUESTIONS 1-9: 0
2. FEELING DOWN, DEPRESSED OR HOPELESS: 0

## 2023-03-21 RX ORDER — HYDROCHLOROTHIAZIDE 25 MG/1
TABLET ORAL
Qty: 90 TABLET | Refills: 1 | Status: SHIPPED | OUTPATIENT
Start: 2023-03-21

## 2023-03-21 RX ORDER — AMLODIPINE BESYLATE 5 MG/1
TABLET ORAL
Qty: 180 TABLET | Refills: 1 | Status: SHIPPED | OUTPATIENT
Start: 2023-03-21

## 2023-04-18 ENCOUNTER — OFFICE VISIT (OUTPATIENT)
Facility: CLINIC | Age: 68
End: 2023-04-18
Payer: MEDICARE

## 2023-04-18 VITALS
OXYGEN SATURATION: 99 % | HEART RATE: 68 BPM | BODY MASS INDEX: 24.84 KG/M2 | HEIGHT: 67 IN | WEIGHT: 158.25 LBS | TEMPERATURE: 98.3 F | DIASTOLIC BLOOD PRESSURE: 74 MMHG | SYSTOLIC BLOOD PRESSURE: 167 MMHG | RESPIRATION RATE: 18 BRPM

## 2023-04-18 DIAGNOSIS — E78.5 HYPERLIPIDEMIA, UNSPECIFIED HYPERLIPIDEMIA TYPE: ICD-10-CM

## 2023-04-18 DIAGNOSIS — M10.9 GOUT, UNSPECIFIED CAUSE, UNSPECIFIED CHRONICITY, UNSPECIFIED SITE: ICD-10-CM

## 2023-04-18 DIAGNOSIS — I10 ESSENTIAL (PRIMARY) HYPERTENSION: Primary | ICD-10-CM

## 2023-04-18 PROCEDURE — 3078F DIAST BP <80 MM HG: CPT | Performed by: FAMILY MEDICINE

## 2023-04-18 PROCEDURE — 99214 OFFICE O/P EST MOD 30 MIN: CPT | Performed by: FAMILY MEDICINE

## 2023-04-18 PROCEDURE — 1123F ACP DISCUSS/DSCN MKR DOCD: CPT | Performed by: FAMILY MEDICINE

## 2023-04-18 PROCEDURE — 3074F SYST BP LT 130 MM HG: CPT | Performed by: FAMILY MEDICINE

## 2023-04-18 RX ORDER — NIFEDIPINE 60 MG/1
60 TABLET, EXTENDED RELEASE ORAL DAILY
Qty: 90 TABLET | Refills: 1 | Status: SHIPPED | OUTPATIENT
Start: 2023-04-18

## 2023-04-18 RX ORDER — ALLOPURINOL 100 MG/1
100 TABLET ORAL DAILY
Qty: 90 TABLET | Refills: 1 | Status: SHIPPED | OUTPATIENT
Start: 2023-04-18

## 2023-04-18 SDOH — ECONOMIC STABILITY: INCOME INSECURITY: HOW HARD IS IT FOR YOU TO PAY FOR THE VERY BASICS LIKE FOOD, HOUSING, MEDICAL CARE, AND HEATING?: NOT HARD AT ALL

## 2023-04-18 SDOH — ECONOMIC STABILITY: FOOD INSECURITY: WITHIN THE PAST 12 MONTHS, YOU WORRIED THAT YOUR FOOD WOULD RUN OUT BEFORE YOU GOT MONEY TO BUY MORE.: NEVER TRUE

## 2023-04-18 SDOH — ECONOMIC STABILITY: FOOD INSECURITY: WITHIN THE PAST 12 MONTHS, THE FOOD YOU BOUGHT JUST DIDN'T LAST AND YOU DIDN'T HAVE MONEY TO GET MORE.: NEVER TRUE

## 2023-04-18 ASSESSMENT — PATIENT HEALTH QUESTIONNAIRE - PHQ9
SUM OF ALL RESPONSES TO PHQ QUESTIONS 1-9: 0
SUM OF ALL RESPONSES TO PHQ9 QUESTIONS 1 & 2: 0
1. LITTLE INTEREST OR PLEASURE IN DOING THINGS: 0
SUM OF ALL RESPONSES TO PHQ QUESTIONS 1-9: 0
SUM OF ALL RESPONSES TO PHQ QUESTIONS 1-9: 0
2. FEELING DOWN, DEPRESSED OR HOPELESS: 0
SUM OF ALL RESPONSES TO PHQ QUESTIONS 1-9: 0

## 2023-04-18 NOTE — PROGRESS NOTES
Lauren Nikhil presents today for   Chief Complaint   Patient presents with    Follow-up     BP (!) 154/80   Pulse 68   Temp 98.3 °F (36.8 °C) (Temporal)   Resp 18   Ht 5' 7\" (1.702 m)   Wt 158 lb 4 oz (71.8 kg)   SpO2 99%   BMI 24.79 kg/m²      1. \"Have you been to the ER, urgent care clinic since your last visit? Hospitalized since your last visit? \" No    2. \"Have you seen or consulted any other health care providers outside of the [address] [place] [address] since your last visit? \" No     3. For patients aged 39-70: Has the patient had a colonoscopy / FIT/ Cologuard? Yes - no Care Gap present      If the patient is female:    4. For patients aged 41-77: Has the patient had a mammogram within the past 2 years? NA - based on age or sex      11. For patients aged 21-65: Has the patient had a pap smear?  NA - based on age or sex
minutes with this established patient. I have discussed the diagnosis with the patient and the intended plan of care as seen in the above orders. The patient has received an after-visit summary and questions were answered concerning future plans. I have discussed medication, side effects, and warnings with the patient in detail. The patient verbalized understanding and is in agreement with the plan of care. The patient will contact the office with any additional concerns. Marly Ayers MD    PLEASE NOTE:   This document has been produced using voice recognition software.  Unrecognized errors in transcription may be present

## 2023-04-20 ENCOUNTER — HOSPITAL ENCOUNTER (EMERGENCY)
Facility: HOSPITAL | Age: 68
Discharge: HOME | End: 2023-04-20
Attending: EMERGENCY MEDICINE
Payer: MEDICARE

## 2023-04-20 ENCOUNTER — TELEPHONE (OUTPATIENT)
Facility: CLINIC | Age: 68
End: 2023-04-20

## 2023-04-20 VITALS
HEART RATE: 78 BPM | DIASTOLIC BLOOD PRESSURE: 67 MMHG | SYSTOLIC BLOOD PRESSURE: 134 MMHG | OXYGEN SATURATION: 98 % | RESPIRATION RATE: 15 BRPM

## 2023-04-20 DIAGNOSIS — R55 SYNCOPE AND COLLAPSE: Primary | ICD-10-CM

## 2023-04-20 LAB
ALBUMIN SERPL-MCNC: 4.2 G/DL (ref 3.4–5)
ALBUMIN/GLOB SERPL: 1.2 (ref 0.8–1.7)
ALP SERPL-CCNC: 88 U/L (ref 45–117)
ALT SERPL-CCNC: 25 U/L (ref 16–61)
ANION GAP SERPL CALC-SCNC: 5 MMOL/L (ref 3–18)
AST SERPL-CCNC: 14 U/L (ref 10–38)
BASOPHILS # BLD: 0 K/UL (ref 0–0.1)
BASOPHILS NFR BLD: 0 % (ref 0–2)
BILIRUB SERPL-MCNC: 0.4 MG/DL (ref 0.2–1)
BUN SERPL-MCNC: 24 MG/DL (ref 7–18)
BUN/CREAT SERPL: 21 (ref 12–20)
CALCIUM SERPL-MCNC: 9.7 MG/DL (ref 8.5–10.1)
CHLORIDE SERPL-SCNC: 106 MMOL/L (ref 100–111)
CO2 SERPL-SCNC: 27 MMOL/L (ref 21–32)
CREAT SERPL-MCNC: 1.15 MG/DL (ref 0.6–1.3)
DIFFERENTIAL METHOD BLD: ABNORMAL
EKG ATRIAL RATE: 78 BPM
EKG DIAGNOSIS: NORMAL
EKG P AXIS: 60 DEGREES
EKG P-R INTERVAL: 128 MS
EKG Q-T INTERVAL: 394 MS
EKG QRS DURATION: 90 MS
EKG QTC CALCULATION (BAZETT): 449 MS
EKG R AXIS: 70 DEGREES
EKG T AXIS: 65 DEGREES
EKG VENTRICULAR RATE: 78 BPM
EOSINOPHIL # BLD: 0.2 K/UL (ref 0–0.4)
EOSINOPHIL NFR BLD: 2 % (ref 0–5)
ERYTHROCYTE [DISTWIDTH] IN BLOOD BY AUTOMATED COUNT: 12.2 % (ref 11.6–14.5)
GLOBULIN SER CALC-MCNC: 3.5 G/DL (ref 2–4)
GLUCOSE SERPL-MCNC: 163 MG/DL (ref 74–99)
HCT VFR BLD AUTO: 44.4 % (ref 36–48)
HGB BLD-MCNC: 15.5 G/DL (ref 13–16)
IMM GRANULOCYTES # BLD AUTO: 0 K/UL (ref 0–0.04)
IMM GRANULOCYTES NFR BLD AUTO: 0 % (ref 0–0.5)
LYMPHOCYTES # BLD: 1.3 K/UL (ref 0.9–3.6)
LYMPHOCYTES NFR BLD: 13 % (ref 21–52)
MAGNESIUM SERPL-MCNC: 2.2 MG/DL (ref 1.6–2.6)
MCH RBC QN AUTO: 30.1 PG (ref 24–34)
MCHC RBC AUTO-ENTMCNC: 34.9 G/DL (ref 31–37)
MCV RBC AUTO: 86.2 FL (ref 78–100)
MONOCYTES # BLD: 0.6 K/UL (ref 0.05–1.2)
MONOCYTES NFR BLD: 6 % (ref 3–10)
NEUTS SEG # BLD: 7.7 K/UL (ref 1.8–8)
NEUTS SEG NFR BLD: 79 % (ref 40–73)
NRBC # BLD: 0 K/UL (ref 0–0.01)
NRBC BLD-RTO: 0 PER 100 WBC
PLATELET # BLD AUTO: 316 K/UL (ref 135–420)
PMV BLD AUTO: 10.2 FL (ref 9.2–11.8)
POTASSIUM SERPL-SCNC: 4.7 MMOL/L (ref 3.5–5.5)
PROT SERPL-MCNC: 7.7 G/DL (ref 6.4–8.2)
RBC # BLD AUTO: 5.15 M/UL (ref 4.35–5.65)
SODIUM SERPL-SCNC: 138 MMOL/L (ref 136–145)
TROPONIN I SERPL HS-MCNC: 117 NG/L (ref 0–78)
TROPONIN I SERPL HS-MCNC: NORMAL NG/L (ref 0–78)
WBC # BLD AUTO: 9.8 K/UL (ref 4.6–13.2)

## 2023-04-20 PROCEDURE — 2580000003 HC RX 258: Performed by: EMERGENCY MEDICINE

## 2023-04-20 PROCEDURE — 99284 EMERGENCY DEPT VISIT MOD MDM: CPT

## 2023-04-20 PROCEDURE — 85025 COMPLETE CBC W/AUTO DIFF WBC: CPT

## 2023-04-20 PROCEDURE — 84484 ASSAY OF TROPONIN QUANT: CPT

## 2023-04-20 PROCEDURE — 93005 ELECTROCARDIOGRAM TRACING: CPT | Performed by: EMERGENCY MEDICINE

## 2023-04-20 PROCEDURE — 80053 COMPREHEN METABOLIC PANEL: CPT

## 2023-04-20 PROCEDURE — 96360 HYDRATION IV INFUSION INIT: CPT

## 2023-04-20 PROCEDURE — 83735 ASSAY OF MAGNESIUM: CPT

## 2023-04-20 PROCEDURE — 93010 ELECTROCARDIOGRAM REPORT: CPT | Performed by: INTERNAL MEDICINE

## 2023-04-20 RX ORDER — 0.9 % SODIUM CHLORIDE 0.9 %
1000 INTRAVENOUS SOLUTION INTRAVENOUS ONCE
Status: COMPLETED | OUTPATIENT
Start: 2023-04-20 | End: 2023-04-20

## 2023-04-20 RX ORDER — 0.9 % SODIUM CHLORIDE 0.9 %
1000 INTRAVENOUS SOLUTION INTRAVENOUS ONCE
Status: DISCONTINUED | OUTPATIENT
Start: 2023-04-20 | End: 2023-04-20 | Stop reason: HOSPADM

## 2023-04-20 RX ADMIN — SODIUM CHLORIDE 1000 ML: 9 INJECTION, SOLUTION INTRAVENOUS at 12:24

## 2023-04-20 ASSESSMENT — ENCOUNTER SYMPTOMS
RESPIRATORY NEGATIVE: 1
GASTROINTESTINAL NEGATIVE: 1

## 2023-04-20 NOTE — ED PROVIDER NOTES
Minutes of Exercise per Session: 60 min   Housing Stability: Unknown    Unstable Housing in the Last Year: No       SCREENINGS                               CIWA Assessment  BP: 134/67  Heart Rate: 78                 PHYSICAL EXAM    (up to 7 for level 4, 8 or more for level 5)     ED Triage Vitals [04/20/23 0952]   BP Temp Temp Source Heart Rate Resp SpO2 Height Weight   136/63 -- Oral 79 12 98 % -- --       Physical Exam  Vitals and nursing note reviewed. Constitutional:       General: He is not in acute distress. Appearance: Normal appearance. He is not ill-appearing, toxic-appearing or diaphoretic. HENT:      Head: Normocephalic and atraumatic. Right Ear: There is no impacted cerumen. Left Ear: There is no impacted cerumen. Nose: No congestion or rhinorrhea. Mouth/Throat:      Mouth: Mucous membranes are moist.   Eyes:      Extraocular Movements: Extraocular movements intact. Neck:      Vascular: No carotid bruit. Cardiovascular:      Rate and Rhythm: Normal rate and regular rhythm. Pulses: Normal pulses. Heart sounds: Normal heart sounds. No murmur heard. No friction rub. No gallop. Pulmonary:      Effort: Pulmonary effort is normal. No respiratory distress. Breath sounds: Normal breath sounds. No stridor. No wheezing, rhonchi or rales. Chest:      Chest wall: No tenderness. Abdominal:      General: There is no distension. Palpations: Abdomen is soft. There is no mass. Tenderness: There is no abdominal tenderness. There is no right CVA tenderness, left CVA tenderness, guarding or rebound. Hernia: No hernia is present. Musculoskeletal:         General: No swelling, tenderness, deformity or signs of injury. Normal range of motion. Cervical back: Normal range of motion. No rigidity or tenderness. Right lower leg: No edema. Left lower leg: No edema. Lymphadenopathy:      Cervical: No cervical adenopathy.    Skin:

## 2023-04-20 NOTE — ED NOTES
Call received from pt's wife stating that pt was started with new BP medications:  Nifedipine 30mg 2tabs OD PM  Lisinopril 40mg 1tab OD AM    Pt was in the bathroom having BM when the syncopal episode happened. And pt had 2x watery stool after.      Lee Bates RN  04/20/23 [id number]

## 2023-04-20 NOTE — ED NOTES
Orthostatic BP taken:    Lyin/74   Sittin/72  Standin/72    Pt denies feeling dizziness or blurring of vision.             Laura Gerard RN  [phone]

## 2023-04-20 NOTE — ED NOTES
Pt is seen and discharged by provider. IV removed. Discharge papers with instructions given, verbalized understanding. Transported to the exit via wheelchair.      Gabi Kam RN  04/20/23 [id number]

## 2023-05-16 ENCOUNTER — TELEPHONE (OUTPATIENT)
Facility: CLINIC | Age: 68
End: 2023-05-16

## 2023-05-16 DIAGNOSIS — I10 ESSENTIAL (PRIMARY) HYPERTENSION: Primary | ICD-10-CM

## 2023-05-16 RX ORDER — NIFEDIPINE 30 MG/1
30 TABLET, EXTENDED RELEASE ORAL DAILY
Qty: 90 TABLET | Refills: 1 | Status: SHIPPED | OUTPATIENT
Start: 2023-05-16

## 2023-05-16 NOTE — TELEPHONE ENCOUNTER
NIFEdipine (PROCARDIA XL) 60 MG extended release tablet   Pt requesting for 30 mgs. [id number] [facility].

## 2023-05-18 ENCOUNTER — OFFICE VISIT (OUTPATIENT)
Age: 68
End: 2023-05-18
Payer: MEDICARE

## 2023-05-18 VITALS
HEART RATE: 83 BPM | BODY MASS INDEX: 24.33 KG/M2 | SYSTOLIC BLOOD PRESSURE: 140 MMHG | OXYGEN SATURATION: 98 % | DIASTOLIC BLOOD PRESSURE: 90 MMHG | HEIGHT: 67 IN | WEIGHT: 155 LBS

## 2023-05-18 DIAGNOSIS — I15.9 SECONDARY HYPERTENSION: Primary | ICD-10-CM

## 2023-05-18 PROCEDURE — 3077F SYST BP >= 140 MM HG: CPT | Performed by: INTERNAL MEDICINE

## 2023-05-18 PROCEDURE — 3080F DIAST BP >= 90 MM HG: CPT | Performed by: INTERNAL MEDICINE

## 2023-05-18 PROCEDURE — 1123F ACP DISCUSS/DSCN MKR DOCD: CPT | Performed by: INTERNAL MEDICINE

## 2023-05-18 PROCEDURE — 99214 OFFICE O/P EST MOD 30 MIN: CPT | Performed by: INTERNAL MEDICINE

## 2023-05-18 NOTE — PROGRESS NOTES
History of Present Illness:  69 YOM here for follow up. He has a history of labile blood pressure and when I saw him in February, I started him on low dose Coreg. He developed gout and required Colchicine and there was concern about interaction and he was switched to Procardia. He was given 60 mg and then had a hypotensive episode requiring ER visit. He is now on 30 mg daily and is doing reasonably well. No new chest pain, dyspnea, presyncope, syncope, PND, orthopnea or edema. 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 Impression:  History of syncope, likely due to orthostasis and vagal November 2022 and then recurrence recently due to medication. He is now taking Procardia at 30 mg daily instead of the Coreg as there was concern about interactions with Colchicine with Coreg. History of MRI scan, as well as carotids and echocardiogram November 2022, unremarkable. History of hepatitis C treatment. BPH. Plan:  From my standpoint it is reasonable to start an exercise program slowly, increasing activity, but continue to monitor blood pressure closely and be careful not to overmedicate and follow up with Dr. Victoriano Lee. I will see back in six months. Wt Readings from Last 3 Encounters:   05/18/23 155 lb (70.3 kg)   04/18/23 158 lb 4 oz (71.8 kg)   03/07/23 166 lb (75.3 kg)     Past Medical History:   Diagnosis Date    BPH (benign prostatic hypertrophy) 10/15/2012    COVID-19     ED (erectile dysfunction) 09/30/2011    Hepatitis C     HTN (hypertension) 09/30/2011    Hyperlipidemia 09/30/2011       Current Outpatient Medications   Medication Sig Dispense Refill    NIFEdipine (PROCARDIA XL) 30 MG extended release tablet Take 1 tablet by mouth daily 90 tablet 1    allopurinol (ZYLOPRIM) 100 MG tablet Take 1 tablet by mouth daily 90 tablet 1    permethrin (ELIMITE) 5 % cream APPLY SPARINGLY AS DIRECTED, APPLY TO ENTIRE BODY, LEAVE ON FOR 8. ..

## 2023-05-30 ENCOUNTER — OFFICE VISIT (OUTPATIENT)
Facility: CLINIC | Age: 68
End: 2023-05-30
Payer: MEDICARE

## 2023-05-30 VITALS
RESPIRATION RATE: 20 BRPM | TEMPERATURE: 98.1 F | BODY MASS INDEX: 24.8 KG/M2 | OXYGEN SATURATION: 100 % | SYSTOLIC BLOOD PRESSURE: 151 MMHG | HEART RATE: 87 BPM | WEIGHT: 158 LBS | HEIGHT: 67 IN | DIASTOLIC BLOOD PRESSURE: 74 MMHG

## 2023-05-30 DIAGNOSIS — N40.1 BENIGN LOCALIZED PROSTATIC HYPERPLASIA WITH LOWER URINARY TRACT SYMPTOMS (LUTS): ICD-10-CM

## 2023-05-30 DIAGNOSIS — M10.9 GOUT, UNSPECIFIED CAUSE, UNSPECIFIED CHRONICITY, UNSPECIFIED SITE: ICD-10-CM

## 2023-05-30 DIAGNOSIS — E78.5 HYPERLIPIDEMIA, UNSPECIFIED HYPERLIPIDEMIA TYPE: ICD-10-CM

## 2023-05-30 DIAGNOSIS — Z91.81 AT HIGH RISK FOR FALLS: ICD-10-CM

## 2023-05-30 DIAGNOSIS — Z12.5 SCREENING FOR MALIGNANT NEOPLASM OF PROSTATE: ICD-10-CM

## 2023-05-30 DIAGNOSIS — I10 ESSENTIAL (PRIMARY) HYPERTENSION: Primary | ICD-10-CM

## 2023-05-30 DIAGNOSIS — R73.9 HYPERGLYCEMIA: ICD-10-CM

## 2023-05-30 PROCEDURE — 99214 OFFICE O/P EST MOD 30 MIN: CPT | Performed by: FAMILY MEDICINE

## 2023-05-30 PROCEDURE — 1123F ACP DISCUSS/DSCN MKR DOCD: CPT | Performed by: FAMILY MEDICINE

## 2023-05-30 PROCEDURE — 3078F DIAST BP <80 MM HG: CPT | Performed by: FAMILY MEDICINE

## 2023-05-30 PROCEDURE — 3074F SYST BP LT 130 MM HG: CPT | Performed by: FAMILY MEDICINE

## 2023-05-30 SDOH — ECONOMIC STABILITY: FOOD INSECURITY: WITHIN THE PAST 12 MONTHS, THE FOOD YOU BOUGHT JUST DIDN'T LAST AND YOU DIDN'T HAVE MONEY TO GET MORE.: NEVER TRUE

## 2023-05-30 SDOH — ECONOMIC STABILITY: FOOD INSECURITY: WITHIN THE PAST 12 MONTHS, YOU WORRIED THAT YOUR FOOD WOULD RUN OUT BEFORE YOU GOT MONEY TO BUY MORE.: NEVER TRUE

## 2023-05-30 SDOH — ECONOMIC STABILITY: INCOME INSECURITY: HOW HARD IS IT FOR YOU TO PAY FOR THE VERY BASICS LIKE FOOD, HOUSING, MEDICAL CARE, AND HEATING?: NOT VERY HARD

## 2023-05-30 ASSESSMENT — PATIENT HEALTH QUESTIONNAIRE - PHQ9
SUM OF ALL RESPONSES TO PHQ9 QUESTIONS 1 & 2: 0
2. FEELING DOWN, DEPRESSED OR HOPELESS: 0
SUM OF ALL RESPONSES TO PHQ QUESTIONS 1-9: 0
1. LITTLE INTEREST OR PLEASURE IN DOING THINGS: 0

## 2023-05-30 NOTE — PROGRESS NOTES
HPI  Heaven Stewart comes in for follow up care. HTN: Patient has hypertension. Blood pressure today is 151/74. He is on lisinopril and nifedipine. Discussed medication adjustments but he declines this. States that the last time we increased any of his medications specifically nifedipine he passed out. He states that with lifestyle and dietary modification and a low-sodium diet he would like blood pressure. I will have him institute the lifestyle modification measures. He will have blood pressure log and we will follow-up at next visit. Dyslipidemia: Patient has dyslipidemia. He is on atorvastatin. He is stable on medication. Continue current treatment plan. Gout arthritis: Patient has gout arthritis. He takes allopurinol daily. He will take a purine restricted diet. BPH/LUTS: Patient has lower urinary tract symptoms. He has post-micturition treatment, incomplete bladder emptying and urgency. I will order urinalysis. I will check PSA. We will refer patient to the urologist for evaluation and management. Orthostatic hypotension: He has a history of orthostatic hypotension. He has been followed up by the cardiologist.  Currently stable. We will continue with management as recommended by the specialist.  Health maintenance: We will order PSA to screen for prostate cancer. Patient will schedule an appointment to come in for Medicare wellness visit.       Past Medical History  Past Medical History:   Diagnosis Date    BPH (benign prostatic hypertrophy) 10/15/2012    COVID-19     ED (erectile dysfunction) 09/30/2011    Hepatitis C     HTN (hypertension) 09/30/2011    Hyperlipidemia 09/30/2011       Surgical History  Past Surgical History:   Procedure Laterality Date    ANTERIOR CRUCIATE LIGAMENT REPAIR      COLONOSCOPY          Medications  Current Outpatient Medications   Medication Sig Dispense Refill    NIFEdipine (PROCARDIA XL) 30 MG extended release tablet Take 1 tablet by mouth daily 90

## 2023-05-30 NOTE — PROGRESS NOTES
1. \"Have you been to the ER, urgent care clinic since your last visit? Hospitalized since your last visit? \"No    2. \"Have you seen or consulted any other health care providers outside of the [address] [place] [address] since your last visit? \" No    3. For patients aged 39-70: Has the patient had a colonoscopy / FIT/ Cologuard? Yes      If the patient is female:    4. For patients aged 41-77: Has the patient had a mammogram within the past 2 years? Not applicable      5. For patients aged 21-65: Has the patient had a pap smear?  Not applicable

## 2023-05-31 ENCOUNTER — HOSPITAL ENCOUNTER (OUTPATIENT)
Facility: HOSPITAL | Age: 68
Discharge: HOME | End: 2023-06-03
Payer: MEDICARE

## 2023-05-31 DIAGNOSIS — E78.5 HYPERLIPIDEMIA, UNSPECIFIED HYPERLIPIDEMIA TYPE: ICD-10-CM

## 2023-05-31 DIAGNOSIS — Z12.5 SPECIAL SCREENING FOR MALIGNANT NEOPLASM OF PROSTATE: ICD-10-CM

## 2023-05-31 DIAGNOSIS — I10 ESSENTIAL HYPERTENSION, MALIGNANT: ICD-10-CM

## 2023-05-31 DIAGNOSIS — R73.9 BLOOD GLUCOSE ELEVATED: ICD-10-CM

## 2023-05-31 DIAGNOSIS — N40.1 ENLARGED PROSTATE WITH URINARY OBSTRUCTION: ICD-10-CM

## 2023-05-31 DIAGNOSIS — N13.8 ENLARGED PROSTATE WITH URINARY OBSTRUCTION: ICD-10-CM

## 2023-05-31 LAB
ALBUMIN SERPL-MCNC: 4.3 G/DL (ref 3.4–5)
ALBUMIN/GLOB SERPL: 1.2 (ref 0.8–1.7)
ALP SERPL-CCNC: 97 U/L (ref 45–117)
ALT SERPL-CCNC: 25 U/L (ref 16–61)
ANION GAP SERPL CALC-SCNC: 2 MMOL/L (ref 3–18)
APPEARANCE UR: CLEAR
AST SERPL-CCNC: 17 U/L (ref 10–38)
BACTERIA URNS QL MICRO: ABNORMAL /HPF
BASOPHILS # BLD: 0 K/UL (ref 0–0.1)
BASOPHILS NFR BLD: 1 % (ref 0–2)
BILIRUB SERPL-MCNC: 0.6 MG/DL (ref 0.2–1)
BILIRUB UR QL: NEGATIVE
BUN SERPL-MCNC: 15 MG/DL (ref 7–18)
BUN/CREAT SERPL: 15 (ref 12–20)
CALCIUM SERPL-MCNC: 9.4 MG/DL (ref 8.5–10.1)
CHLORIDE SERPL-SCNC: 107 MMOL/L (ref 100–111)
CHOLEST SERPL-MCNC: 170 MG/DL
CO2 SERPL-SCNC: 29 MMOL/L (ref 21–32)
COLOR UR: YELLOW
CREAT SERPL-MCNC: 1.01 MG/DL (ref 0.6–1.3)
DIFFERENTIAL METHOD BLD: NORMAL
EOSINOPHIL # BLD: 0.1 K/UL (ref 0–0.4)
EOSINOPHIL NFR BLD: 2 % (ref 0–5)
EPITH CASTS URNS QL MICRO: NEGATIVE /LPF (ref 0–5)
ERYTHROCYTE [DISTWIDTH] IN BLOOD BY AUTOMATED COUNT: 12.8 % (ref 11.6–14.5)
EST. AVERAGE GLUCOSE BLD GHB EST-MCNC: 114 MG/DL
GLOBULIN SER CALC-MCNC: 3.6 G/DL (ref 2–4)
GLUCOSE SERPL-MCNC: 101 MG/DL (ref 74–99)
GLUCOSE UR STRIP.AUTO-MCNC: NEGATIVE MG/DL
HBA1C MFR BLD: 5.6 % (ref 4.2–5.6)
HCT VFR BLD AUTO: 45.6 % (ref 36–48)
HDLC SERPL-MCNC: 46 MG/DL (ref 40–60)
HDLC SERPL: 3.7 (ref 0–5)
HGB BLD-MCNC: 15.5 G/DL (ref 13–16)
HGB UR QL STRIP: NEGATIVE
IMM GRANULOCYTES # BLD AUTO: 0 K/UL (ref 0–0.04)
IMM GRANULOCYTES NFR BLD AUTO: 0 % (ref 0–0.5)
KETONES UR QL STRIP.AUTO: NEGATIVE MG/DL
LDLC SERPL CALC-MCNC: 109.4 MG/DL (ref 0–100)
LEUKOCYTE ESTERASE UR QL STRIP.AUTO: NEGATIVE
LIPID PANEL: ABNORMAL
LYMPHOCYTES # BLD: 2.3 K/UL (ref 0.9–3.6)
LYMPHOCYTES NFR BLD: 41 % (ref 21–52)
MCH RBC QN AUTO: 30.8 PG (ref 24–34)
MCHC RBC AUTO-ENTMCNC: 34 G/DL (ref 31–37)
MCV RBC AUTO: 90.7 FL (ref 78–100)
MONOCYTES # BLD: 0.5 K/UL (ref 0.05–1.2)
MONOCYTES NFR BLD: 10 % (ref 3–10)
NEUTS SEG # BLD: 2.6 K/UL (ref 1.8–8)
NEUTS SEG NFR BLD: 47 % (ref 40–73)
NITRITE UR QL STRIP.AUTO: NEGATIVE
NRBC # BLD: 0 K/UL (ref 0–0.01)
NRBC BLD-RTO: 0 PER 100 WBC
PH UR STRIP: 7 (ref 5–8)
PLATELET # BLD AUTO: 266 K/UL (ref 135–420)
PMV BLD AUTO: 10.6 FL (ref 9.2–11.8)
POTASSIUM SERPL-SCNC: 4.8 MMOL/L (ref 3.5–5.5)
PROT SERPL-MCNC: 7.9 G/DL (ref 6.4–8.2)
PROT UR STRIP-MCNC: NEGATIVE MG/DL
PSA SERPL-MCNC: 1.3 NG/ML (ref 0–4)
RBC # BLD AUTO: 5.03 M/UL (ref 4.35–5.65)
RBC #/AREA URNS HPF: ABNORMAL /HPF (ref 0–5)
SODIUM SERPL-SCNC: 138 MMOL/L (ref 136–145)
SP GR UR REFRACTOMETRY: 1.01 (ref 1–1.03)
TRIGL SERPL-MCNC: 73 MG/DL
UROBILINOGEN UR QL STRIP.AUTO: 0.2 EU/DL (ref 0.2–1)
VLDLC SERPL CALC-MCNC: 14.6 MG/DL
WBC # BLD AUTO: 5.6 K/UL (ref 4.6–13.2)
WBC URNS QL MICRO: NEGATIVE /HPF (ref 0–4)

## 2023-05-31 PROCEDURE — 84153 ASSAY OF PSA TOTAL: CPT

## 2023-05-31 PROCEDURE — 80053 COMPREHEN METABOLIC PANEL: CPT

## 2023-05-31 PROCEDURE — 81001 URINALYSIS AUTO W/SCOPE: CPT

## 2023-05-31 PROCEDURE — 85025 COMPLETE CBC W/AUTO DIFF WBC: CPT

## 2023-05-31 PROCEDURE — 83036 HEMOGLOBIN GLYCOSYLATED A1C: CPT

## 2023-05-31 PROCEDURE — 36415 COLL VENOUS BLD VENIPUNCTURE: CPT

## 2023-05-31 PROCEDURE — 80061 LIPID PANEL: CPT

## 2023-06-08 ENCOUNTER — TELEPHONE (OUTPATIENT)
Facility: CLINIC | Age: 68
End: 2023-06-08

## 2023-06-20 NOTE — TELEPHONE ENCOUNTER
Left message for patient to call advising why the Amoxicillin is needed-advised provider was out of the office

## 2023-06-22 RX ORDER — AMOXICILLIN AND CLAVULANATE POTASSIUM 875; 125 MG/1; MG/1
TABLET, FILM COATED ORAL
Qty: 20 TABLET | OUTPATIENT
Start: 2023-06-22

## 2023-09-25 ENCOUNTER — OFFICE VISIT (OUTPATIENT)
Facility: CLINIC | Age: 68
End: 2023-09-25
Payer: MEDICARE

## 2023-09-25 VITALS
SYSTOLIC BLOOD PRESSURE: 172 MMHG | HEIGHT: 67 IN | RESPIRATION RATE: 20 BRPM | OXYGEN SATURATION: 99 % | HEART RATE: 76 BPM | DIASTOLIC BLOOD PRESSURE: 82 MMHG | BODY MASS INDEX: 23.86 KG/M2 | TEMPERATURE: 98 F | WEIGHT: 152 LBS

## 2023-09-25 DIAGNOSIS — Z23 ENCOUNTER FOR IMMUNIZATION: ICD-10-CM

## 2023-09-25 DIAGNOSIS — E78.5 HYPERLIPIDEMIA, UNSPECIFIED HYPERLIPIDEMIA TYPE: ICD-10-CM

## 2023-09-25 DIAGNOSIS — R73.9 HYPERGLYCEMIA: ICD-10-CM

## 2023-09-25 DIAGNOSIS — I10 ESSENTIAL (PRIMARY) HYPERTENSION: Primary | ICD-10-CM

## 2023-09-25 DIAGNOSIS — M10.9 GOUT, UNSPECIFIED CAUSE, UNSPECIFIED CHRONICITY, UNSPECIFIED SITE: ICD-10-CM

## 2023-09-25 DIAGNOSIS — R97.20 ELEVATED PSA: ICD-10-CM

## 2023-09-25 PROCEDURE — 3078F DIAST BP <80 MM HG: CPT | Performed by: FAMILY MEDICINE

## 2023-09-25 PROCEDURE — 99213 OFFICE O/P EST LOW 20 MIN: CPT | Performed by: FAMILY MEDICINE

## 2023-09-25 PROCEDURE — 90694 VACC AIIV4 NO PRSRV 0.5ML IM: CPT | Performed by: FAMILY MEDICINE

## 2023-09-25 PROCEDURE — 3074F SYST BP LT 130 MM HG: CPT | Performed by: FAMILY MEDICINE

## 2023-09-25 PROCEDURE — 1123F ACP DISCUSS/DSCN MKR DOCD: CPT | Performed by: FAMILY MEDICINE

## 2023-09-25 PROCEDURE — G0008 ADMIN INFLUENZA VIRUS VAC: HCPCS | Performed by: FAMILY MEDICINE

## 2023-09-25 RX ORDER — ATORVASTATIN CALCIUM 20 MG/1
20 TABLET, FILM COATED ORAL DAILY
Qty: 90 TABLET | Refills: 1 | Status: SHIPPED | OUTPATIENT
Start: 2023-09-25

## 2023-09-25 RX ORDER — COLCHICINE 0.6 MG/1
0.6 TABLET ORAL DAILY
COMMUNITY

## 2023-09-25 SDOH — ECONOMIC STABILITY: FOOD INSECURITY: WITHIN THE PAST 12 MONTHS, THE FOOD YOU BOUGHT JUST DIDN'T LAST AND YOU DIDN'T HAVE MONEY TO GET MORE.: NEVER TRUE

## 2023-09-25 SDOH — ECONOMIC STABILITY: FOOD INSECURITY: WITHIN THE PAST 12 MONTHS, YOU WORRIED THAT YOUR FOOD WOULD RUN OUT BEFORE YOU GOT MONEY TO BUY MORE.: NEVER TRUE

## 2023-09-25 SDOH — ECONOMIC STABILITY: INCOME INSECURITY: HOW HARD IS IT FOR YOU TO PAY FOR THE VERY BASICS LIKE FOOD, HOUSING, MEDICAL CARE, AND HEATING?: NOT VERY HARD

## 2023-09-25 ASSESSMENT — PATIENT HEALTH QUESTIONNAIRE - PHQ9
SUM OF ALL RESPONSES TO PHQ QUESTIONS 1-9: 0
SUM OF ALL RESPONSES TO PHQ QUESTIONS 1-9: 0
1. LITTLE INTEREST OR PLEASURE IN DOING THINGS: 0
SUM OF ALL RESPONSES TO PHQ QUESTIONS 1-9: 0
2. FEELING DOWN, DEPRESSED OR HOPELESS: 0
SUM OF ALL RESPONSES TO PHQ QUESTIONS 1-9: 0
SUM OF ALL RESPONSES TO PHQ9 QUESTIONS 1 & 2: 0

## 2023-09-25 NOTE — PROGRESS NOTES
Patient received Fluad Quad immunization IM to left deltoid. Shetolerated procedure well. Patient was observed for 10 minutes, no adverse effects noted. She left ambulatory with no complaints of pain or distress noted. Patient hasreceived immunizations in office prior to today. 1. \"Have you been to the ER, urgent   care clinic since your last visit? Hospitalized since your last visit? \"No    2. \"Have you seen or consulted any other health care providers outside of the [address] [place] [address] since your last visit? \" No    3. For patients aged 43-73: Has the patient had a colonoscopy / FIT/ Cologuard? Yes      If the patient is female:    4. For patients aged 43-66: Has the patient had a mammogram within the past 2 years? Not applicable      5. For patients aged 21-65: Has the patient had a pap smear?  Not applicable
FOR 8...  (REFER TO PRESCRIPTION NOTES). aspirin 81 MG EC tablet Take 1 tablet by mouth daily      Multiple Vitamin (MULTIVITAMIN PO) Take 1 tablet by mouth daily      lisinopril (PRINIVIL;ZESTRIL) 40 MG tablet take 1 tablet by mouth once daily      allopurinol (ZYLOPRIM) 100 MG tablet Take 1 tablet by mouth daily (Patient not taking: Reported on 2023) 90 tablet 1     No current facility-administered medications for this visit. Allergies  Allergies   Allergen Reactions    Amlodipine-Olmesartan Other (See Comments) and Palpitations     Fatigue, headache, pt states \"kidneys hurt\" (tolerates amlodipine alone)    Polyethylene Glycol 3350 Swelling    Atenolol Rash       Family History  Family History   Problem Relation Age of Onset    Hypertension Mother     Alcohol Abuse Father     Hypertension Father     Hypertension Brother     Hypertension Brother        Social History  Social History     Socioeconomic History    Marital status:      Spouse name: Not on file    Number of children: Not on file    Years of education: Not on file    Highest education level: Not on file   Occupational History    Not on file   Tobacco Use    Smoking status: Former     Packs/day: 0.50     Years: 13.00     Additional pack years: 0.00     Total pack years: 6.50     Types: Cigarettes     Start date: 1972     Quit date: 1985     Years since quittin.7     Passive exposure: Past    Smokeless tobacco: Never   Substance and Sexual Activity    Alcohol use:  Yes     Alcohol/week: 1.0 standard drink of alcohol    Drug use: No    Sexual activity: Not on file   Other Topics Concern    Not on file   Social History Narrative    Not on file     Social Determinants of Health     Financial Resource Strain: Low Risk  (2023)    Overall Financial Resource Strain (CARDIA)     Difficulty of Paying Living Expenses: Not very hard   Food Insecurity: No Food Insecurity (2023)    Hunger Vital Sign     Worried About Running

## 2023-09-28 ENCOUNTER — HOSPITAL ENCOUNTER (OUTPATIENT)
Facility: HOSPITAL | Age: 68
Discharge: HOME | End: 2023-09-28
Payer: MEDICARE

## 2023-09-28 DIAGNOSIS — R73.9 BLOOD GLUCOSE ELEVATED: ICD-10-CM

## 2023-09-28 DIAGNOSIS — M10.9 GOUT, UNSPECIFIED CAUSE, UNSPECIFIED CHRONICITY, UNSPECIFIED SITE: ICD-10-CM

## 2023-09-28 DIAGNOSIS — I10 ESSENTIAL HYPERTENSION, MALIGNANT: ICD-10-CM

## 2023-09-28 DIAGNOSIS — E78.5 HYPERLIPIDEMIA, UNSPECIFIED HYPERLIPIDEMIA TYPE: ICD-10-CM

## 2023-09-28 DIAGNOSIS — R97.20 ELEVATED PSA: ICD-10-CM

## 2023-09-28 DIAGNOSIS — Z12.5 SPECIAL SCREENING FOR MALIGNANT NEOPLASM OF PROSTATE: ICD-10-CM

## 2023-09-28 DIAGNOSIS — N13.8 ENLARGED PROSTATE WITH URINARY OBSTRUCTION: ICD-10-CM

## 2023-09-28 DIAGNOSIS — I10 ESSENTIAL (PRIMARY) HYPERTENSION: ICD-10-CM

## 2023-09-28 DIAGNOSIS — N40.1 ENLARGED PROSTATE WITH URINARY OBSTRUCTION: ICD-10-CM

## 2023-09-28 DIAGNOSIS — R73.9 HYPERGLYCEMIA: ICD-10-CM

## 2023-09-28 LAB
ALBUMIN SERPL-MCNC: 4.3 G/DL (ref 3.4–5)
ALBUMIN/GLOB SERPL: 1 (ref 0.8–1.7)
ALP SERPL-CCNC: 105 U/L (ref 45–117)
ALT SERPL-CCNC: 26 U/L (ref 16–61)
ANION GAP SERPL CALC-SCNC: 5 MMOL/L (ref 3–18)
APPEARANCE UR: CLEAR
AST SERPL-CCNC: 18 U/L (ref 10–38)
BACTERIA URNS QL MICRO: NEGATIVE /HPF
BASOPHILS # BLD: 0 K/UL (ref 0–0.1)
BASOPHILS NFR BLD: 0 % (ref 0–2)
BILIRUB SERPL-MCNC: 0.4 MG/DL (ref 0.2–1)
BILIRUB UR QL: NEGATIVE
BUN SERPL-MCNC: 17 MG/DL (ref 7–18)
BUN/CREAT SERPL: 14 (ref 12–20)
CALCIUM SERPL-MCNC: 9.8 MG/DL (ref 8.5–10.1)
CHLORIDE SERPL-SCNC: 105 MMOL/L (ref 100–111)
CHOLEST SERPL-MCNC: 166 MG/DL
CO2 SERPL-SCNC: 29 MMOL/L (ref 21–32)
COLOR UR: YELLOW
CREAT SERPL-MCNC: 1.22 MG/DL (ref 0.6–1.3)
DIFFERENTIAL METHOD BLD: ABNORMAL
EOSINOPHIL # BLD: 0.1 K/UL (ref 0–0.4)
EOSINOPHIL NFR BLD: 1 % (ref 0–5)
EPITH CASTS URNS QL MICRO: ABNORMAL /LPF (ref 0–5)
ERYTHROCYTE [DISTWIDTH] IN BLOOD BY AUTOMATED COUNT: 12.2 % (ref 11.6–14.5)
EST. AVERAGE GLUCOSE BLD GHB EST-MCNC: 117 MG/DL
GLOBULIN SER CALC-MCNC: 4.2 G/DL (ref 2–4)
GLUCOSE SERPL-MCNC: 79 MG/DL (ref 74–99)
GLUCOSE UR STRIP.AUTO-MCNC: NEGATIVE MG/DL
HBA1C MFR BLD: 5.7 % (ref 4.2–5.6)
HCT VFR BLD AUTO: 46.8 % (ref 36–48)
HDLC SERPL-MCNC: 41 MG/DL (ref 40–60)
HDLC SERPL: 4 (ref 0–5)
HGB BLD-MCNC: 15.8 G/DL (ref 13–16)
HGB UR QL STRIP: NEGATIVE
IMM GRANULOCYTES # BLD AUTO: 0 K/UL (ref 0–0.04)
IMM GRANULOCYTES NFR BLD AUTO: 0 % (ref 0–0.5)
KETONES UR QL STRIP.AUTO: 15 MG/DL
LDLC SERPL CALC-MCNC: 108.4 MG/DL (ref 0–100)
LEUKOCYTE ESTERASE UR QL STRIP.AUTO: NEGATIVE
LIPID PANEL: ABNORMAL
LYMPHOCYTES # BLD: 2.9 K/UL (ref 0.9–3.6)
LYMPHOCYTES NFR BLD: 44 % (ref 21–52)
MCH RBC QN AUTO: 30.4 PG (ref 24–34)
MCHC RBC AUTO-ENTMCNC: 33.8 G/DL (ref 31–37)
MCV RBC AUTO: 90 FL (ref 78–100)
MONOCYTES # BLD: 0.7 K/UL (ref 0.05–1.2)
MONOCYTES NFR BLD: 11 % (ref 3–10)
NEUTS SEG # BLD: 2.9 K/UL (ref 1.8–8)
NEUTS SEG NFR BLD: 44 % (ref 40–73)
NITRITE UR QL STRIP.AUTO: NEGATIVE
NRBC # BLD: 0 K/UL (ref 0–0.01)
NRBC BLD-RTO: 0 PER 100 WBC
PH UR STRIP: 6.5 (ref 5–8)
PLATELET # BLD AUTO: 301 K/UL (ref 135–420)
PMV BLD AUTO: 10.6 FL (ref 9.2–11.8)
POTASSIUM SERPL-SCNC: 4.1 MMOL/L (ref 3.5–5.5)
PROT SERPL-MCNC: 8.5 G/DL (ref 6.4–8.2)
PROT UR STRIP-MCNC: NEGATIVE MG/DL
RBC # BLD AUTO: 5.2 M/UL (ref 4.35–5.65)
RBC #/AREA URNS HPF: NEGATIVE /HPF (ref 0–5)
SODIUM SERPL-SCNC: 139 MMOL/L (ref 136–145)
SP GR UR REFRACTOMETRY: 1.01 (ref 1–1.03)
TRIGL SERPL-MCNC: 83 MG/DL
URATE SERPL-MCNC: 7.5 MG/DL (ref 2.6–7.2)
UROBILINOGEN UR QL STRIP.AUTO: 0.2 EU/DL (ref 0.2–1)
VLDLC SERPL CALC-MCNC: 16.6 MG/DL
WBC # BLD AUTO: 6.5 K/UL (ref 4.6–13.2)
WBC URNS QL MICRO: ABNORMAL /HPF (ref 0–4)

## 2023-09-28 PROCEDURE — 85025 COMPLETE CBC W/AUTO DIFF WBC: CPT

## 2023-09-28 PROCEDURE — 84550 ASSAY OF BLOOD/URIC ACID: CPT

## 2023-09-28 PROCEDURE — 84154 ASSAY OF PSA FREE: CPT

## 2023-09-28 PROCEDURE — 80061 LIPID PANEL: CPT

## 2023-09-28 PROCEDURE — 80053 COMPREHEN METABOLIC PANEL: CPT

## 2023-09-28 PROCEDURE — 36415 COLL VENOUS BLD VENIPUNCTURE: CPT

## 2023-09-28 PROCEDURE — 81001 URINALYSIS AUTO W/SCOPE: CPT

## 2023-09-28 PROCEDURE — 84153 ASSAY OF PSA TOTAL: CPT

## 2023-09-28 PROCEDURE — 83036 HEMOGLOBIN GLYCOSYLATED A1C: CPT

## 2023-09-30 LAB
PSA FREE MFR SERPL: 21.4 %
PSA FREE SERPL-MCNC: 0.92 NG/ML
PSA SERPL-MCNC: 4.3 NG/ML (ref 0–4)

## 2023-10-02 DIAGNOSIS — R97.20 ELEVATED PSA: Primary | ICD-10-CM

## 2023-11-09 DIAGNOSIS — I10 ESSENTIAL (PRIMARY) HYPERTENSION: ICD-10-CM

## 2023-11-13 RX ORDER — NIFEDIPINE 30 MG/1
30 TABLET, EXTENDED RELEASE ORAL DAILY
Qty: 90 TABLET | Refills: 1 | Status: SHIPPED | OUTPATIENT
Start: 2023-11-13

## 2023-11-22 RX ORDER — LISINOPRIL 40 MG/1
40 TABLET ORAL DAILY
Qty: 90 TABLET | Refills: 1 | Status: SHIPPED | OUTPATIENT
Start: 2023-11-22

## 2023-11-26 RX ORDER — LISINOPRIL 40 MG/1
40 TABLET ORAL DAILY
Qty: 90 TABLET | Refills: 1 | Status: SHIPPED | OUTPATIENT
Start: 2023-11-26

## 2023-12-05 ENCOUNTER — TELEPHONE (OUTPATIENT)
Facility: CLINIC | Age: 68
End: 2023-12-05

## 2023-12-05 RX ORDER — AMOXICILLIN AND CLAVULANATE POTASSIUM 875; 125 MG/1; MG/1
1 TABLET, FILM COATED ORAL 2 TIMES DAILY
Qty: 20 TABLET | Refills: 0 | Status: SHIPPED | OUTPATIENT
Start: 2023-12-05 | End: 2023-12-15

## 2023-12-05 NOTE — TELEPHONE ENCOUNTER
Spoke with Jesus Delacruz and she advised the patient had some congestion and mucus    ----- Message from [facility] sent at 12/5/2023  4:03 PM EST -----  Subject: Refill Request    QUESTIONS  Name of Medication? Other - amoxicillin-clavulanate (AUGMENTIN) 875-125 mg   per tablet   Patient-reported dosage and instructions? Take 1 tablet by mouth 2 times   daily for 5 days  How many days do you have left? 0  Preferred Pharmacy? [address] [place] [address]  Pharmacy phone number (if available)? [phone]  Additional Information for Provider? please call if there are any issues   refilling   ---------------------------------------------------------------------------  --------------  CALL BACK INFO  What is the best way for the office to contact you? OK to leave message on   voicemail  Preferred Call Back Phone Number? [phone]  ---------------------------------------------------------------------------  --------------  SCRIPT ANSWERS  Relationship to Patient? Spouse/Partner  Representative Name? paul  Is the representative on the Communication Release of Information (DAVIE)   form in Epic?  Yes

## 2024-01-18 ENCOUNTER — OFFICE VISIT (OUTPATIENT)
Age: 69
End: 2024-01-18
Payer: MEDICARE

## 2024-01-18 VITALS
OXYGEN SATURATION: 98 % | DIASTOLIC BLOOD PRESSURE: 82 MMHG | BODY MASS INDEX: 24.9 KG/M2 | SYSTOLIC BLOOD PRESSURE: 160 MMHG | WEIGHT: 159 LBS | HEART RATE: 96 BPM

## 2024-01-18 DIAGNOSIS — I10 PRIMARY HYPERTENSION: Primary | ICD-10-CM

## 2024-01-18 DIAGNOSIS — I95.1 ORTHOSTASIS: ICD-10-CM

## 2024-01-18 PROCEDURE — 3077F SYST BP >= 140 MM HG: CPT | Performed by: INTERNAL MEDICINE

## 2024-01-18 PROCEDURE — 99214 OFFICE O/P EST MOD 30 MIN: CPT | Performed by: INTERNAL MEDICINE

## 2024-01-18 PROCEDURE — 3079F DIAST BP 80-89 MM HG: CPT | Performed by: INTERNAL MEDICINE

## 2024-01-18 PROCEDURE — 1123F ACP DISCUSS/DSCN MKR DOCD: CPT | Performed by: INTERNAL MEDICINE

## 2024-01-18 NOTE — PROGRESS NOTES
History of Present Illness:  68 year-old male here for followup.  He has history of labile blood pressure, a little bit high today.  He does not check it routinely at home.  No new chest pain, dyspnea, PND, orthopnea or edema.  He is trying to get back to exercise and recently started going on the treadmill exercising the past couple of days and no symptoms associated with this.  No new chest pain, dyspnea, PND, orthopnea or edema.      Impression:    History of syncope remotely likely due to orthostasis and vagal 11/2022 and recurrence due to medication.    History of MRI scan, as well as carotids and echocardiogram in 11/2022 unremarkable.    History of hepatitis C treatment.    BPH on Tamsulosin.     Plan:  He has started back on the treadmill without any limitation.  I have asked him to keep an eye on his blood pressure checking it a couple of times a week to make sure systolic is 130 mmHg or less.  He will let me know otherwise.  I am cautious to overtreat given his previous orthostasis and syncope.  All questions were answered.  I will plan to see him back annually.          Wt Readings from Last 3 Encounters:   01/18/24 72.1 kg (159 lb)   01/04/24 70.3 kg (155 lb)   09/25/23 68.9 kg (152 lb)     Past Medical History:   Diagnosis Date    BPH (benign prostatic hypertrophy) 10/15/2012    COVID-19     ED (erectile dysfunction) 09/30/2011    Hepatitis C     HTN (hypertension) 09/30/2011    Hyperlipidemia 09/30/2011       Current Outpatient Medications   Medication Sig Dispense Refill    tamsulosin (FLOMAX) 0.4 MG capsule Take 1 capsule by mouth daily 90 capsule 3    lisinopril (PRINIVIL;ZESTRIL) 40 MG tablet TAKE 1 TABLET BY MOUTH EVERY DAY 90 tablet 1    NIFEdipine (PROCARDIA XL) 30 MG extended release tablet TAKE 1 TABLET BY MOUTH DAILY 90 tablet 1    colchicine (COLCRYS) 0.6 MG tablet Take 1 tablet by mouth daily      atorvastatin (LIPITOR) 20 MG tablet Take 1 tablet by mouth daily (Patient taking differently:

## 2024-02-27 RX ORDER — COLCHICINE 0.6 MG/1
0.6 TABLET ORAL DAILY
Qty: 30 TABLET | Refills: 1 | Status: SHIPPED | OUTPATIENT
Start: 2024-02-27

## 2024-04-29 RX ORDER — COLCHICINE 0.6 MG/1
0.6 TABLET ORAL DAILY
Qty: 90 TABLET | Refills: 1 | Status: SHIPPED | OUTPATIENT
Start: 2024-04-29

## 2024-04-29 NOTE — TELEPHONE ENCOUNTER
Last seen 9/25/2023   Last labs   Last filled  2/27/24  Next appointment 5/22/2024     Lab Results   Component Value Date     09/28/2023    K 4.1 09/28/2023     09/28/2023    CO2 29 09/28/2023    BUN 17 09/28/2023    CREATININE 1.22 09/28/2023    GLUCOSE 79 09/28/2023    CALCIUM 9.8 09/28/2023    PROT 8.5 (H) 09/28/2023    BILITOT 0.4 09/28/2023    ALKPHOS 105 09/28/2023    AST 18 09/28/2023    ALT 26 09/28/2023    LABGLOM >60 09/28/2023    GFRAA >60.0 05/12/2022    AGRATIO 1.0 09/28/2023    GLOB 4.2 (H) 09/28/2023

## 2024-05-20 RX ORDER — LISINOPRIL 40 MG/1
40 TABLET ORAL DAILY
Qty: 90 TABLET | Refills: 1 | Status: SHIPPED | OUTPATIENT
Start: 2024-05-20

## 2024-05-22 ENCOUNTER — OFFICE VISIT (OUTPATIENT)
Facility: CLINIC | Age: 69
End: 2024-05-22

## 2024-05-22 VITALS
BODY MASS INDEX: 24.55 KG/M2 | SYSTOLIC BLOOD PRESSURE: 139 MMHG | HEART RATE: 78 BPM | DIASTOLIC BLOOD PRESSURE: 65 MMHG | OXYGEN SATURATION: 100 % | TEMPERATURE: 97.7 F | RESPIRATION RATE: 16 BRPM | WEIGHT: 156.4 LBS | HEIGHT: 67 IN

## 2024-05-22 DIAGNOSIS — E07.9 THYROID DISORDER: ICD-10-CM

## 2024-05-22 DIAGNOSIS — R39.9 LOWER URINARY TRACT SYMPTOMS (LUTS): ICD-10-CM

## 2024-05-22 DIAGNOSIS — I10 ESSENTIAL (PRIMARY) HYPERTENSION: Primary | ICD-10-CM

## 2024-05-22 DIAGNOSIS — M10.9 GOUT, UNSPECIFIED CAUSE, UNSPECIFIED CHRONICITY, UNSPECIFIED SITE: ICD-10-CM

## 2024-05-22 DIAGNOSIS — E78.5 HYPERLIPIDEMIA, UNSPECIFIED HYPERLIPIDEMIA TYPE: ICD-10-CM

## 2024-05-22 DIAGNOSIS — R73.9 HYPERGLYCEMIA: ICD-10-CM

## 2024-05-22 DIAGNOSIS — Z12.5 SCREENING FOR MALIGNANT NEOPLASM OF PROSTATE: ICD-10-CM

## 2024-05-22 RX ORDER — ATORVASTATIN CALCIUM 10 MG/1
TABLET, FILM COATED ORAL
Qty: 90 TABLET | Refills: 0 | Status: SHIPPED | OUTPATIENT
Start: 2024-05-22

## 2024-05-22 SDOH — ECONOMIC STABILITY: FOOD INSECURITY: WITHIN THE PAST 12 MONTHS, YOU WORRIED THAT YOUR FOOD WOULD RUN OUT BEFORE YOU GOT MONEY TO BUY MORE.: NEVER TRUE

## 2024-05-22 SDOH — ECONOMIC STABILITY: FOOD INSECURITY: WITHIN THE PAST 12 MONTHS, THE FOOD YOU BOUGHT JUST DIDN'T LAST AND YOU DIDN'T HAVE MONEY TO GET MORE.: NEVER TRUE

## 2024-05-22 SDOH — ECONOMIC STABILITY: INCOME INSECURITY: HOW HARD IS IT FOR YOU TO PAY FOR THE VERY BASICS LIKE FOOD, HOUSING, MEDICAL CARE, AND HEATING?: NOT HARD AT ALL

## 2024-05-22 ASSESSMENT — PATIENT HEALTH QUESTIONNAIRE - PHQ9
SUM OF ALL RESPONSES TO PHQ QUESTIONS 1-9: 0
SUM OF ALL RESPONSES TO PHQ QUESTIONS 1-9: 0
SUM OF ALL RESPONSES TO PHQ9 QUESTIONS 1 & 2: 0
1. LITTLE INTEREST OR PLEASURE IN DOING THINGS: NOT AT ALL
SUM OF ALL RESPONSES TO PHQ QUESTIONS 1-9: 0
SUM OF ALL RESPONSES TO PHQ QUESTIONS 1-9: 0
2. FEELING DOWN, DEPRESSED OR HOPELESS: NOT AT ALL

## 2024-05-22 NOTE — PROGRESS NOTES
\"Have you been to the ER, urgent care clinic since your last visit?  Hospitalized since your last visit?\"    NO    “Have you seen or consulted any other health care providers outside of [facility] since your last visit?”    NO            Click Here for Release of Records Request    
(5/22/2024)    Overall Financial Resource Strain (CARDIA)     Difficulty of Paying Living Expenses: Not hard at all   Food Insecurity: No Food Insecurity (5/22/2024)    Hunger Vital Sign     Worried About Running Out of Food in the Last Year: Never true     Ran Out of Food in the Last Year: Never true   Transportation Needs: Unknown (5/22/2024)    PRAPARE - Transportation     Lack of Transportation (Medical): Not on file     Lack of Transportation (Non-Medical): No   Physical Activity: Sufficiently Active (3/7/2023)    Exercise Vital Sign     Days of Exercise per Week: 7 days     Minutes of Exercise per Session: 60 min   Stress: Not on file   Social Connections: Not on file   Intimate Partner Violence: Not on file   Housing Stability: Unknown (5/22/2024)    Housing Stability Vital Sign     Unable to Pay for Housing in the Last Year: Not on file     Number of Places Lived in the Last Year: Not on file     Unstable Housing in the Last Year: No       Review of Systems  Review of Systems - Review of all systems is negative except as noted above in the HPI.    Vital Signs  /65   Pulse 78   Temp 97.7 °F (36.5 °C) (Temporal)   Resp 16   Ht 1.702 m (5' 7\")   Wt 70.9 kg (156 lb 6.4 oz)   SpO2 100%   BMI 24.50 kg/m²       Physical Exam  Physical Examination: General appearance - alert, well appearing, and in no distress, oriented to person, place, and time, and acyanotic, in no respiratory distress  Mental status - alert, oriented to person, place, and time  Neck - supple, no significant adenopathy  Lymphatics - no palpable lymphadenopathy, no hepatosplenomegaly  Chest - clear to auscultation, no wheezes, rales or rhonchi, symmetric air entry  Heart - normal rate and regular rhythm, S1 and S2 normal  Abdomen - soft, nontender, nondistended, no masses or organomegaly  Back exam - limited range of motion  Neurological - normal muscle tone, no tremors, strength 5/5  Musculoskeletal - osteoarthritic changes noted in

## 2024-06-10 DIAGNOSIS — I10 ESSENTIAL (PRIMARY) HYPERTENSION: ICD-10-CM

## 2024-06-10 RX ORDER — NIFEDIPINE 30 MG/1
30 TABLET, EXTENDED RELEASE ORAL DAILY
Qty: 90 TABLET | Refills: 1 | Status: SHIPPED | OUTPATIENT
Start: 2024-06-10

## 2024-06-10 NOTE — TELEPHONE ENCOUNTER
Last seen 5/22/2024   Last labs 09/28/2023  Last filled  11/13/2023  Next appointment 8/22/2024     Lab Results   Component Value Date     09/28/2023    K 4.1 09/28/2023     09/28/2023    CO2 29 09/28/2023    BUN 17 09/28/2023    CREATININE 1.22 09/28/2023    GLUCOSE 79 09/28/2023    CALCIUM 9.8 09/28/2023    BILITOT 0.4 09/28/2023    ALKPHOS 105 09/28/2023    AST 18 09/28/2023    ALT 26 09/28/2023    LABGLOM >60 09/28/2023    GFRAA >60.0 05/12/2022    AGRATIO 1.8 12/14/2022    GLOB 4.2 (H) 09/28/2023

## 2024-06-20 ENCOUNTER — HOSPITAL ENCOUNTER (OUTPATIENT)
Facility: HOSPITAL | Age: 69
Discharge: HOME | End: 2024-06-20
Payer: MEDICARE

## 2024-06-20 DIAGNOSIS — R39.9 LOWER URINARY TRACT SYMPTOMS (LUTS): ICD-10-CM

## 2024-06-20 DIAGNOSIS — R73.9 HYPERGLYCEMIA: ICD-10-CM

## 2024-06-20 DIAGNOSIS — M10.9 GOUT, UNSPECIFIED CAUSE, UNSPECIFIED CHRONICITY, UNSPECIFIED SITE: ICD-10-CM

## 2024-06-20 DIAGNOSIS — E07.9 THYROID DISORDER: ICD-10-CM

## 2024-06-20 DIAGNOSIS — E78.5 HYPERLIPIDEMIA, UNSPECIFIED HYPERLIPIDEMIA TYPE: ICD-10-CM

## 2024-06-20 DIAGNOSIS — Z12.5 SCREENING FOR MALIGNANT NEOPLASM OF PROSTATE: ICD-10-CM

## 2024-06-20 DIAGNOSIS — I10 ESSENTIAL (PRIMARY) HYPERTENSION: ICD-10-CM

## 2024-06-20 LAB
ALBUMIN SERPL-MCNC: 3.9 G/DL (ref 3.4–5)
ALBUMIN/GLOB SERPL: 1.1 (ref 0.8–1.7)
ALP SERPL-CCNC: 102 U/L (ref 45–117)
ALT SERPL-CCNC: 59 U/L (ref 16–61)
ANION GAP SERPL CALC-SCNC: 3 MMOL/L (ref 3–18)
APPEARANCE UR: CLEAR
AST SERPL-CCNC: 38 U/L (ref 10–38)
BACTERIA URNS QL MICRO: NEGATIVE /HPF
BASOPHILS # BLD: 0 K/UL (ref 0–0.1)
BASOPHILS NFR BLD: 0 % (ref 0–2)
BILIRUB SERPL-MCNC: 0.6 MG/DL (ref 0.2–1)
BILIRUB UR QL: NEGATIVE
BUN SERPL-MCNC: 13 MG/DL (ref 7–18)
BUN/CREAT SERPL: 11 (ref 12–20)
CALCIUM SERPL-MCNC: 9.5 MG/DL (ref 8.5–10.1)
CHLORIDE SERPL-SCNC: 107 MMOL/L (ref 100–111)
CHOLEST SERPL-MCNC: 170 MG/DL
CO2 SERPL-SCNC: 28 MMOL/L (ref 21–32)
COLOR UR: YELLOW
CREAT SERPL-MCNC: 1.18 MG/DL (ref 0.6–1.3)
DIFFERENTIAL METHOD BLD: NORMAL
EOSINOPHIL # BLD: 0.1 K/UL (ref 0–0.4)
EOSINOPHIL NFR BLD: 1 % (ref 0–5)
EPITH CASTS URNS QL MICRO: NEGATIVE /LPF (ref 0–5)
ERYTHROCYTE [DISTWIDTH] IN BLOOD BY AUTOMATED COUNT: 12.3 % (ref 11.6–14.5)
EST. AVERAGE GLUCOSE BLD GHB EST-MCNC: 117 MG/DL
GLOBULIN SER CALC-MCNC: 3.6 G/DL (ref 2–4)
GLUCOSE SERPL-MCNC: 96 MG/DL (ref 74–99)
GLUCOSE UR STRIP.AUTO-MCNC: NEGATIVE MG/DL
HBA1C MFR BLD: 5.7 % (ref 4.2–5.6)
HCT VFR BLD AUTO: 45.5 % (ref 36–48)
HDLC SERPL-MCNC: 49 MG/DL (ref 40–60)
HDLC SERPL: 3.5 (ref 0–5)
HGB BLD-MCNC: 15.5 G/DL (ref 13–16)
HGB UR QL STRIP: NEGATIVE
IMM GRANULOCYTES # BLD AUTO: 0 K/UL (ref 0–0.04)
IMM GRANULOCYTES NFR BLD AUTO: 0 % (ref 0–0.5)
KETONES UR QL STRIP.AUTO: NEGATIVE MG/DL
LDLC SERPL CALC-MCNC: 104.8 MG/DL (ref 0–100)
LEUKOCYTE ESTERASE UR QL STRIP.AUTO: NEGATIVE
LIPID PANEL: ABNORMAL
LYMPHOCYTES # BLD: 2.1 K/UL (ref 0.9–3.6)
LYMPHOCYTES NFR BLD: 44 % (ref 21–52)
MCH RBC QN AUTO: 30.4 PG (ref 24–34)
MCHC RBC AUTO-ENTMCNC: 34.1 G/DL (ref 31–37)
MCV RBC AUTO: 89.2 FL (ref 78–100)
MONOCYTES # BLD: 0.4 K/UL (ref 0.05–1.2)
MONOCYTES NFR BLD: 9 % (ref 3–10)
NEUTS SEG # BLD: 2.2 K/UL (ref 1.8–8)
NEUTS SEG NFR BLD: 46 % (ref 40–73)
NITRITE UR QL STRIP.AUTO: NEGATIVE
NRBC # BLD: 0 K/UL (ref 0–0.01)
NRBC BLD-RTO: 0 PER 100 WBC
PH UR STRIP: 7.5 (ref 5–8)
PLATELET # BLD AUTO: 269 K/UL (ref 135–420)
PMV BLD AUTO: 11.3 FL (ref 9.2–11.8)
POTASSIUM SERPL-SCNC: 4.4 MMOL/L (ref 3.5–5.5)
PROT SERPL-MCNC: 7.5 G/DL (ref 6.4–8.2)
PROT UR STRIP-MCNC: NEGATIVE MG/DL
PSA SERPL-MCNC: 1.3 NG/ML (ref 0–4)
RBC # BLD AUTO: 5.1 M/UL (ref 4.35–5.65)
RBC #/AREA URNS HPF: NORMAL /HPF (ref 0–5)
SODIUM SERPL-SCNC: 138 MMOL/L (ref 136–145)
SP GR UR REFRACTOMETRY: 1.01 (ref 1–1.03)
TRIGL SERPL-MCNC: 81 MG/DL
TSH SERPL DL<=0.05 MIU/L-ACNC: 1.92 UIU/ML (ref 0.36–3.74)
URATE SERPL-MCNC: 7.3 MG/DL (ref 2.6–7.2)
UROBILINOGEN UR QL STRIP.AUTO: 0.2 EU/DL (ref 0.2–1)
VLDLC SERPL CALC-MCNC: 16.2 MG/DL
WBC # BLD AUTO: 4.9 K/UL (ref 4.6–13.2)
WBC URNS QL MICRO: NORMAL /HPF (ref 0–4)

## 2024-06-20 PROCEDURE — G0103 PSA SCREENING: HCPCS

## 2024-06-20 PROCEDURE — 85025 COMPLETE CBC W/AUTO DIFF WBC: CPT

## 2024-06-20 PROCEDURE — 36415 COLL VENOUS BLD VENIPUNCTURE: CPT

## 2024-06-20 PROCEDURE — 84550 ASSAY OF BLOOD/URIC ACID: CPT

## 2024-06-20 PROCEDURE — 80053 COMPREHEN METABOLIC PANEL: CPT

## 2024-06-20 PROCEDURE — 80061 LIPID PANEL: CPT

## 2024-06-20 PROCEDURE — 81001 URINALYSIS AUTO W/SCOPE: CPT

## 2024-06-20 PROCEDURE — 84443 ASSAY THYROID STIM HORMONE: CPT

## 2024-06-20 PROCEDURE — 83036 HEMOGLOBIN GLYCOSYLATED A1C: CPT

## 2024-08-22 ENCOUNTER — OFFICE VISIT (OUTPATIENT)
Facility: CLINIC | Age: 69
End: 2024-08-22

## 2024-08-22 VITALS
OXYGEN SATURATION: 98 % | TEMPERATURE: 98.4 F | HEART RATE: 80 BPM | DIASTOLIC BLOOD PRESSURE: 64 MMHG | HEIGHT: 67 IN | BODY MASS INDEX: 24.48 KG/M2 | SYSTOLIC BLOOD PRESSURE: 130 MMHG | RESPIRATION RATE: 20 BRPM | WEIGHT: 156 LBS

## 2024-08-22 DIAGNOSIS — M10.9 GOUT, UNSPECIFIED CAUSE, UNSPECIFIED CHRONICITY, UNSPECIFIED SITE: ICD-10-CM

## 2024-08-22 DIAGNOSIS — E55.9 HYPOVITAMINOSIS D: ICD-10-CM

## 2024-08-22 DIAGNOSIS — R73.9 HYPERGLYCEMIA: ICD-10-CM

## 2024-08-22 DIAGNOSIS — E78.5 HYPERLIPIDEMIA, UNSPECIFIED HYPERLIPIDEMIA TYPE: ICD-10-CM

## 2024-08-22 DIAGNOSIS — I10 ESSENTIAL (PRIMARY) HYPERTENSION: ICD-10-CM

## 2024-08-22 DIAGNOSIS — R39.9 LOWER URINARY TRACT SYMPTOMS (LUTS): ICD-10-CM

## 2024-08-22 DIAGNOSIS — I10 HYPERTENSION, UNSPECIFIED TYPE: ICD-10-CM

## 2024-08-22 DIAGNOSIS — Z00.00 INITIAL MEDICARE ANNUAL WELLNESS VISIT: Primary | ICD-10-CM

## 2024-08-22 DIAGNOSIS — E07.9 THYROID DISORDER: ICD-10-CM

## 2024-08-22 ASSESSMENT — PATIENT HEALTH QUESTIONNAIRE - PHQ9
SUM OF ALL RESPONSES TO PHQ QUESTIONS 1-9: 0
SUM OF ALL RESPONSES TO PHQ QUESTIONS 1-9: 0
1. LITTLE INTEREST OR PLEASURE IN DOING THINGS: NOT AT ALL
SUM OF ALL RESPONSES TO PHQ9 QUESTIONS 1 & 2: 0
SUM OF ALL RESPONSES TO PHQ QUESTIONS 1-9: 0
2. FEELING DOWN, DEPRESSED OR HOPELESS: NOT AT ALL
SUM OF ALL RESPONSES TO PHQ QUESTIONS 1-9: 0

## 2024-08-22 ASSESSMENT — LIFESTYLE VARIABLES
HOW OFTEN DO YOU HAVE A DRINK CONTAINING ALCOHOL: NEVER
HOW MANY STANDARD DRINKS CONTAINING ALCOHOL DO YOU HAVE ON A TYPICAL DAY: PATIENT DOES NOT DRINK

## 2024-08-22 NOTE — PROGRESS NOTES
1. \"Have you been to the ER, urgent care clinic since your last visit?  Hospitalized since your last visit?\"No    2. \"Have you seen or consulted any other health care providers outside of the [facility] since your last visit?\" No    3. For patients aged 45-75: Has the patient had a colonoscopy / FIT/ Cologuard? Yes      If the patient is female:    4. For patients aged 40-74: Has the patient had a mammogram within the past 2 years? Not applicable      5. For patients aged 21-65: Has the patient had a pap smear? Not applicable  
tenderness noted  Back exam - limited range of motion  Neurological - neck supple without rigidity  Musculoskeletal - osteoarthritic changes noted in both hands  Extremities - intact peripheral pulses      Results  No results found for this visit on 08/22/24.    ASSESSMENT and PLAN    ICD-10-CM    1. Essential (primary) hypertension  I10 CBC with Auto Differential     Comprehensive Metabolic Panel     Lipid Panel      2. Hyperglycemia  R73.9 Hemoglobin A1C      3. Gout, unspecified cause, unspecified chronicity, unspecified site  M10.9 Uric Acid      4. Lower urinary tract symptoms (LUTS)  R39.9       5. Hyperlipidemia, unspecified hyperlipidemia type  E78.5       6. Hypertension, unspecified type  I10       7. Hypovitaminosis D  E55.9 Vitamin D 25 Hydroxy      8. Thyroid disorder  E07.9 TSH + Free T4 Panel      lab results and schedule of future lab studies reviewed with patient  reviewed diet, exercise and weight control  cardiovascular risk and specific lipid/LDL goals reviewed  reviewed medications and side effects in detail      I have discussed the diagnosis with the patient and the intended plan of care as seen in the above orders. The patient has received an after-visit summary and questions were answered concerning future plans. I have discussed medication, side effects, and warnings with the patient in detail. The patient verbalized understanding and is in agreement with the plan of care. The patient will contact the office with any additional concerns.    Gabriel Mai MD    PLEASE NOTE:   This document has been produced using voice recognition software. Unrecognized errors in transcription may be present

## 2024-10-08 ENCOUNTER — OFFICE VISIT (OUTPATIENT)
Facility: CLINIC | Age: 69
End: 2024-10-08
Payer: MEDICARE

## 2024-10-08 DIAGNOSIS — Z23 FLU VACCINE NEED: Primary | ICD-10-CM

## 2024-10-08 PROCEDURE — G0008 ADMIN INFLUENZA VIRUS VAC: HCPCS | Performed by: FAMILY MEDICINE

## 2024-10-08 PROCEDURE — 90653 IIV ADJUVANT VACCINE IM: CPT | Performed by: FAMILY MEDICINE

## 2024-10-29 ENCOUNTER — OFFICE VISIT (OUTPATIENT)
Facility: CLINIC | Age: 69
End: 2024-10-29
Payer: MEDICARE

## 2024-10-29 DIAGNOSIS — Z23 FLU VACCINE NEED: ICD-10-CM

## 2024-10-29 DIAGNOSIS — Z23 NEED FOR VACCINATION WITH 20-POLYVALENT PNEUMOCOCCAL CONJUGATE VACCINE: Primary | ICD-10-CM

## 2024-10-29 PROCEDURE — 90677 PCV20 VACCINE IM: CPT | Performed by: FAMILY MEDICINE

## 2024-10-29 PROCEDURE — G0009 ADMIN PNEUMOCOCCAL VACCINE: HCPCS | Performed by: FAMILY MEDICINE

## 2024-11-06 DIAGNOSIS — E78.5 HYPERLIPIDEMIA, UNSPECIFIED HYPERLIPIDEMIA TYPE: ICD-10-CM

## 2024-11-07 RX ORDER — COLCHICINE 0.6 MG/1
0.6 TABLET ORAL DAILY
Qty: 90 TABLET | Refills: 1 | Status: SHIPPED | OUTPATIENT
Start: 2024-11-07

## 2024-11-07 RX ORDER — ATORVASTATIN CALCIUM 10 MG/1
TABLET, FILM COATED ORAL
Qty: 90 TABLET | Refills: 0 | Status: SHIPPED | OUTPATIENT
Start: 2024-11-07

## 2024-11-07 NOTE — TELEPHONE ENCOUNTER
Last seen 10/29/2024   Last labs 6/20/2024  Last filled  5/22/2024 atorvastatin                   4/29/2024 colchicine   Next appointment 8/25/2025     Lab Results   Component Value Date     06/20/2024    K 4.4 06/20/2024     06/20/2024    CO2 28 06/20/2024    BUN 13 06/20/2024    CREATININE 1.18 06/20/2024    GLUCOSE 96 06/20/2024    CALCIUM 9.5 06/20/2024    BILITOT 0.6 06/20/2024    ALKPHOS 102 06/20/2024    AST 38 06/20/2024    ALT 59 06/20/2024    LABGLOM 66 06/20/2024    GFRAA >60.0 05/12/2022    AGRATIO 1.8 12/14/2022    GLOB 3.6 06/20/2024

## 2024-12-06 DIAGNOSIS — I10 ESSENTIAL (PRIMARY) HYPERTENSION: ICD-10-CM

## 2024-12-06 NOTE — TELEPHONE ENCOUNTER
Last seen 10/29/2024   Last labs   Last filled    Next appointment 8/25/2025     Lab Results   Component Value Date     06/20/2024    K 4.4 06/20/2024     06/20/2024    CO2 28 06/20/2024    BUN 13 06/20/2024    CREATININE 1.18 06/20/2024    GLUCOSE 96 06/20/2024    CALCIUM 9.5 06/20/2024    BILITOT 0.6 06/20/2024    ALKPHOS 102 06/20/2024    AST 38 06/20/2024    ALT 59 06/20/2024    LABGLOM 66 06/20/2024    GFRAA >60.0 05/12/2022    AGRATIO 1.8 12/14/2022    GLOB 3.6 06/20/2024

## 2024-12-09 RX ORDER — NIFEDIPINE 30 MG/1
30 TABLET, EXTENDED RELEASE ORAL DAILY
Qty: 90 TABLET | Refills: 1 | Status: SHIPPED | OUTPATIENT
Start: 2024-12-09

## 2025-01-16 ENCOUNTER — OFFICE VISIT (OUTPATIENT)
Age: 70
End: 2025-01-16
Payer: MEDICARE

## 2025-01-16 VITALS
HEART RATE: 83 BPM | DIASTOLIC BLOOD PRESSURE: 100 MMHG | SYSTOLIC BLOOD PRESSURE: 180 MMHG | OXYGEN SATURATION: 98 % | BODY MASS INDEX: 25.11 KG/M2 | HEIGHT: 67 IN | WEIGHT: 160 LBS

## 2025-01-16 DIAGNOSIS — I10 PRIMARY HYPERTENSION: Primary | ICD-10-CM

## 2025-01-16 DIAGNOSIS — I95.1 ORTHOSTASIS: ICD-10-CM

## 2025-01-16 DIAGNOSIS — R55 SYNCOPE AND COLLAPSE: ICD-10-CM

## 2025-01-16 PROCEDURE — 3080F DIAST BP >= 90 MM HG: CPT | Performed by: INTERNAL MEDICINE

## 2025-01-16 PROCEDURE — 1123F ACP DISCUSS/DSCN MKR DOCD: CPT | Performed by: INTERNAL MEDICINE

## 2025-01-16 PROCEDURE — 3077F SYST BP >= 140 MM HG: CPT | Performed by: INTERNAL MEDICINE

## 2025-01-16 PROCEDURE — 99214 OFFICE O/P EST MOD 30 MIN: CPT | Performed by: INTERNAL MEDICINE

## 2025-01-16 NOTE — PROGRESS NOTES
HPI:  A 69-year-old male here for followup.  He has history of labile blood pressure.  It is higher today than usual.  He did have some salty salmon last night.  No new chest pain, dyspnea, PND, orthopnea, edema.  He exercises regularly.    Impression:  History of remote syncope due to orthostasis and vagal episode in November 2022.  History of MRI scan as well as carotids and echocardiogram in November 2022, unremarkable.  History of remote hepatitis C treatment.  BPH, on Flomax.    Plan:  His blood pressure systolic is 180 today.  He does have a history of labile blood pressure with syncope.  He takes his lisinopril in the morning which he did and nifedipine at night.  There has been no change.  He did have some salty salmon last night.  I have asked him to monitor his blood pressure at home and then for the next week and if this continues consistently greater than 140 mmHg, make an appointment with Dr. Mai, his primary physician.  We talked about salt restriction long term as well.  I would be hesitant to use diuretics given his propensity to orthostasis.  It may be reasonable to increase his Procardia if his blood pressure systolic is consistently greater than 140 mmHg.  Otherwise, see back in a year.      Wt Readings from Last 3 Encounters:   01/16/25 72.6 kg (160 lb)   08/22/24 70.8 kg (156 lb)   07/22/24 70.3 kg (155 lb)     Past Medical History:   Diagnosis Date    BPH (benign prostatic hypertrophy) 10/15/2012    COVID-19     ED (erectile dysfunction) 09/30/2011    Hepatitis C     HTN (hypertension) 09/30/2011    Hyperlipidemia 09/30/2011       Current Outpatient Medications   Medication Sig Dispense Refill    NIFEdipine (PROCARDIA XL) 30 MG extended release tablet TAKE 1 TABLET BY MOUTH DAILY 90 tablet 1    atorvastatin (LIPITOR) 10 MG tablet TAKE 1 TABLET BY MOUTH EVERY 48 HOURS 90 tablet 0    colchicine (COLCRYS) 0.6 MG tablet TAKE 1 TABLET BY MOUTH DAILY 90 tablet 1    lisinopril (PRINIVIL;ZESTRIL)

## 2025-01-18 ENCOUNTER — HOSPITAL ENCOUNTER (EMERGENCY)
Facility: HOSPITAL | Age: 70
Discharge: HOME | End: 2025-01-18
Payer: MEDICARE

## 2025-01-18 VITALS
WEIGHT: 155 LBS | BODY MASS INDEX: 24.33 KG/M2 | RESPIRATION RATE: 18 BRPM | HEIGHT: 67 IN | HEART RATE: 83 BPM | SYSTOLIC BLOOD PRESSURE: 167 MMHG | OXYGEN SATURATION: 99 % | DIASTOLIC BLOOD PRESSURE: 84 MMHG | TEMPERATURE: 97.5 F

## 2025-01-18 DIAGNOSIS — I10 PRIMARY HYPERTENSION: Primary | ICD-10-CM

## 2025-01-18 LAB
ALBUMIN SERPL-MCNC: 4.2 G/DL (ref 3.4–5)
ALBUMIN/GLOB SERPL: 1 (ref 0.8–1.7)
ALP SERPL-CCNC: 90 U/L (ref 45–117)
ALT SERPL-CCNC: 28 U/L (ref 16–61)
ANION GAP SERPL CALC-SCNC: 2 MMOL/L (ref 3–18)
AST SERPL-CCNC: 25 U/L (ref 10–38)
BASOPHILS # BLD: 0.01 K/UL (ref 0–0.1)
BASOPHILS NFR BLD: 0.2 % (ref 0–2)
BILIRUB SERPL-MCNC: 0.4 MG/DL (ref 0.2–1)
BUN SERPL-MCNC: 19 MG/DL (ref 7–18)
BUN/CREAT SERPL: 15 (ref 12–20)
CALCIUM SERPL-MCNC: 9.5 MG/DL (ref 8.5–10.1)
CHLORIDE SERPL-SCNC: 107 MMOL/L (ref 100–111)
CO2 SERPL-SCNC: 29 MMOL/L (ref 21–32)
CREAT SERPL-MCNC: 1.23 MG/DL (ref 0.6–1.3)
DIFFERENTIAL METHOD BLD: ABNORMAL
EOSINOPHIL # BLD: 0.15 K/UL (ref 0–0.4)
EOSINOPHIL NFR BLD: 3.2 % (ref 0–5)
ERYTHROCYTE [DISTWIDTH] IN BLOOD BY AUTOMATED COUNT: 12 % (ref 11.6–14.5)
GLOBULIN SER CALC-MCNC: 4.2 G/DL (ref 2–4)
GLUCOSE SERPL-MCNC: 100 MG/DL (ref 74–99)
HCT VFR BLD AUTO: 46.5 % (ref 36–48)
HGB BLD-MCNC: 15.8 G/DL (ref 13–16)
IMM GRANULOCYTES # BLD AUTO: 0 K/UL (ref 0–0.04)
IMM GRANULOCYTES NFR BLD AUTO: 0 % (ref 0–0.5)
LYMPHOCYTES # BLD: 2.25 K/UL (ref 0.9–3.6)
LYMPHOCYTES NFR BLD: 48 % (ref 21–52)
MCH RBC QN AUTO: 30.6 PG (ref 24–34)
MCHC RBC AUTO-ENTMCNC: 34 G/DL (ref 31–37)
MCV RBC AUTO: 89.9 FL (ref 78–100)
MONOCYTES # BLD: 0.45 K/UL (ref 0.05–1.2)
MONOCYTES NFR BLD: 9.6 % (ref 3–10)
NEUTS SEG # BLD: 1.83 K/UL (ref 1.8–8)
NEUTS SEG NFR BLD: 39 % (ref 40–73)
NRBC # BLD: 0 K/UL (ref 0–0.01)
NRBC BLD-RTO: 0 PER 100 WBC
PLATELET # BLD AUTO: 248 K/UL (ref 135–420)
PMV BLD AUTO: 10.5 FL (ref 9.2–11.8)
POTASSIUM SERPL-SCNC: 4.5 MMOL/L (ref 3.5–5.5)
PROT SERPL-MCNC: 8.4 G/DL (ref 6.4–8.2)
RBC # BLD AUTO: 5.17 M/UL (ref 4.35–5.65)
SODIUM SERPL-SCNC: 138 MMOL/L (ref 136–145)
WBC # BLD AUTO: 4.7 K/UL (ref 4.6–13.2)

## 2025-01-18 PROCEDURE — 80053 COMPREHEN METABOLIC PANEL: CPT

## 2025-01-18 PROCEDURE — 99284 EMERGENCY DEPT VISIT MOD MDM: CPT

## 2025-01-18 PROCEDURE — 85025 COMPLETE CBC W/AUTO DIFF WBC: CPT

## 2025-01-18 PROCEDURE — 93005 ELECTROCARDIOGRAM TRACING: CPT | Performed by: STUDENT IN AN ORGANIZED HEALTH CARE EDUCATION/TRAINING PROGRAM

## 2025-01-18 ASSESSMENT — PAIN - FUNCTIONAL ASSESSMENT: PAIN_FUNCTIONAL_ASSESSMENT: NONE - DENIES PAIN

## 2025-01-18 NOTE — ED PROVIDER NOTES
EMERGENCY DEPARTMENT HISTORY AND PHYSICAL EXAM      Patient Name: Ezio Andrea  MRN: [id number]  YOB: 1955  Provider: Holley Goldsmith PA-C  PCP: Gabriel Mai MD   Time/Date of evaluation: 11:59 AM EST on 1/18/25    History of Presenting Illness     Chief Complaint   Patient presents with    Hypertension       History Provided By: Patient and Patient's Wife     History (Narative):   Ezio Andrea is a 69 y.o. male with a PMHX of BPH, COVID-19, erectile dysfunction, hypertension  who presents to the emergency department  by POV C/O of hypertension.  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  He states that he recently saw his  cardiologist, Dr. Singletary, it was noted to have high blood pressure.  Patient was advised to go to his primary care doctor, for medication adjustment.  However, this morning he woke up and was using his blood pressure cuff and noticed a systolic of 200s and diastolic in the 100s.  Patient states that he took his blood pressure medication.  And on arrival into the ER his blood pressure was 167/84.  He denies any active symptoms such as shortness of breath, headache, slurred speech, numbness, weakness.  Patient endorses that he otherwise has good exercise level and has been eating healthy.  Past History     Past Medical History:  Past Medical History:   Diagnosis Date    BPH (benign prostatic hypertrophy) 10/15/2012    COVID-19     ED (erectile dysfunction) 09/30/2011    Hepatitis C     HTN (hypertension) 09/30/2011    Hyperlipidemia 09/30/2011       Past Surgical History:  Past Surgical History:   Procedure Laterality Date    ANTERIOR CRUCIATE LIGAMENT REPAIR      COLONOSCOPY         Family History:  Family History   Problem Relation Age of Onset    Hypertension Mother     Alcohol Abuse Father     Hypertension Father     Hypertension Brother     Hypertension Brother        Social History:  Social

## 2025-01-18 NOTE — ED TRIAGE NOTES
Pt came ambulatory to triage c/I elevated BP readings at home. Pt states that his systolic BP at home was 200mmHg which prompted him to go to ED.    Denies chest pain, dizziness or headache at this time.    Pt takes Lisinopril and Nifedipine.    Hx of HTN, hyperlipidemia and BPH.

## 2025-01-19 LAB
EKG ATRIAL RATE: 77 BPM
EKG DIAGNOSIS: NORMAL
EKG P AXIS: 75 DEGREES
EKG P-R INTERVAL: 132 MS
EKG Q-T INTERVAL: 388 MS
EKG QRS DURATION: 86 MS
EKG QTC CALCULATION (BAZETT): 439 MS
EKG R AXIS: 68 DEGREES
EKG T AXIS: 62 DEGREES
EKG VENTRICULAR RATE: 77 BPM

## 2025-01-19 PROCEDURE — 93010 ELECTROCARDIOGRAM REPORT: CPT | Performed by: INTERNAL MEDICINE

## 2025-02-14 NOTE — TELEPHONE ENCOUNTER
Last seen 10/29/2024   Last labs 1/18/2025  Last filled  5/20/2024  Next appointment 8/25/2025     Lab Results   Component Value Date     01/18/2025    K 4.5 01/18/2025     01/18/2025    CO2 29 01/18/2025    BUN 19 (H) 01/18/2025    CREATININE 1.23 01/18/2025    GLUCOSE 100 (H) 01/18/2025    CALCIUM 9.5 01/18/2025    BILITOT 0.4 01/18/2025    ALKPHOS 90 01/18/2025    AST 25 01/18/2025    ALT 28 01/18/2025    LABGLOM 63 01/18/2025    GFRAA >60.0 05/12/2022    AGRATIO 1.8 12/14/2022    GLOB 4.2 (H) 01/18/2025

## 2025-02-17 RX ORDER — LISINOPRIL 40 MG/1
40 TABLET ORAL DAILY
Qty: 90 TABLET | Refills: 1 | Status: SHIPPED | OUTPATIENT
Start: 2025-02-17

## 2025-03-10 ENCOUNTER — TELEPHONE (OUTPATIENT)
Age: 70
End: 2025-03-10

## 2025-03-10 NOTE — TELEPHONE ENCOUNTER
----- Message from Dr. You Singletary MD sent at 2/24/2025  2:59 PM EST -----  Regarding: test results  Please send letter to patient stating echo normal for age. Thanks

## 2025-05-08 RX ORDER — COLCHICINE 0.6 MG/1
0.6 TABLET ORAL DAILY
Qty: 90 TABLET | Refills: 1 | Status: SHIPPED | OUTPATIENT
Start: 2025-05-08

## 2025-05-08 NOTE — TELEPHONE ENCOUNTER
Last seen 10/29/2024   Last labs 1/18/2025  Last filled  11/7/2024  Next appointment 8/25/2025     Lab Results   Component Value Date     01/18/2025    K 4.5 01/18/2025     01/18/2025    CO2 29 01/18/2025    BUN 19 (H) 01/18/2025    CREATININE 1.23 01/18/2025    GLUCOSE 100 (H) 01/18/2025    CALCIUM 9.5 01/18/2025    BILITOT 0.4 01/18/2025    ALKPHOS 90 01/18/2025    AST 25 01/18/2025    ALT 28 01/18/2025    LABGLOM 63 01/18/2025    GFRAA >60.0 05/12/2022    AGRATIO 1.8 12/14/2022    GLOB 4.2 (H) 01/18/2025

## 2025-05-14 DIAGNOSIS — E78.5 HYPERLIPIDEMIA, UNSPECIFIED HYPERLIPIDEMIA TYPE: ICD-10-CM

## 2025-05-14 RX ORDER — ATORVASTATIN CALCIUM 10 MG/1
TABLET, FILM COATED ORAL
Qty: 90 TABLET | Refills: 0 | OUTPATIENT
Start: 2025-05-14

## 2025-05-28 ENCOUNTER — TELEPHONE (OUTPATIENT)
Dept: CARDIOLOGY | Facility: HOSPITAL | Age: 70
End: 2025-05-28

## 2025-05-28 NOTE — TELEPHONE ENCOUNTER
Patient dropped off EKG, shows PAC's, skipped beats, cannot exclude atrial tachycardia.  I discussed with Arvin, nurse, to call  patient and order 2 week event monitor, then followup with me.

## 2025-05-29 ENCOUNTER — TELEPHONE (OUTPATIENT)
Age: 70
End: 2025-05-29

## 2025-05-29 DIAGNOSIS — I49.1 PAC (PREMATURE ATRIAL CONTRACTION): Primary | ICD-10-CM

## 2025-05-30 ENCOUNTER — TELEPHONE (OUTPATIENT)
Age: 70
End: 2025-05-30

## 2025-05-30 NOTE — TELEPHONE ENCOUNTER
Attempted to contact patient at this time regarding event monitor; no answer noted; vm left to contact this office.

## 2025-05-31 ENCOUNTER — APPOINTMENT (OUTPATIENT)
Facility: HOSPITAL | Age: 70
End: 2025-05-31
Payer: MEDICARE

## 2025-05-31 ENCOUNTER — HOSPITAL ENCOUNTER (OUTPATIENT)
Facility: HOSPITAL | Age: 70
Setting detail: OBSERVATION
LOS: 1 days | Discharge: HOME | End: 2025-06-01
Attending: STUDENT IN AN ORGANIZED HEALTH CARE EDUCATION/TRAINING PROGRAM | Admitting: INTERNAL MEDICINE
Payer: MEDICARE

## 2025-05-31 DIAGNOSIS — E78.5 HYPERLIPIDEMIA, UNSPECIFIED HYPERLIPIDEMIA TYPE: ICD-10-CM

## 2025-05-31 DIAGNOSIS — G45.9 TIA (TRANSIENT ISCHEMIC ATTACK): Primary | ICD-10-CM

## 2025-05-31 LAB
ALBUMIN SERPL-MCNC: 4 G/DL (ref 3.4–5)
ALBUMIN/GLOB SERPL: 1.2 (ref 0.8–1.7)
ALP SERPL-CCNC: 97 U/L (ref 45–117)
ALT SERPL-CCNC: 19 U/L (ref 10–50)
ANION GAP SERPL CALC-SCNC: 10 MMOL/L (ref 3–18)
ANION GAP SERPL CALC-SCNC: 12 MMOL/L (ref 3–18)
AST SERPL-CCNC: 24 U/L (ref 10–38)
BASOPHILS # BLD: 0.03 K/UL (ref 0–0.1)
BASOPHILS NFR BLD: 0.4 % (ref 0–2)
BILIRUB SERPL-MCNC: 0.4 MG/DL (ref 0.2–1)
BUN SERPL-MCNC: 16 MG/DL (ref 6–23)
BUN SERPL-MCNC: 16 MG/DL (ref 6–23)
BUN/CREAT SERPL: 15 (ref 12–20)
BUN/CREAT SERPL: 15 (ref 12–20)
CALCIUM SERPL-MCNC: 10.2 MG/DL (ref 8.5–10.1)
CALCIUM SERPL-MCNC: 9.8 MG/DL (ref 8.5–10.1)
CHLORIDE SERPL-SCNC: 103 MMOL/L (ref 98–107)
CHLORIDE SERPL-SCNC: 104 MMOL/L (ref 98–107)
CO2 SERPL-SCNC: 27 MMOL/L (ref 21–32)
CO2 SERPL-SCNC: 27 MMOL/L (ref 21–32)
CREAT SERPL-MCNC: 1.01 MG/DL (ref 0.6–1.3)
CREAT SERPL-MCNC: 1.13 MG/DL (ref 0.6–1.3)
DIFFERENTIAL METHOD BLD: NORMAL
EOSINOPHIL # BLD: 0.05 K/UL (ref 0–0.4)
EOSINOPHIL NFR BLD: 0.7 % (ref 0–5)
ERYTHROCYTE [DISTWIDTH] IN BLOOD BY AUTOMATED COUNT: 12.3 % (ref 11.6–14.5)
GLOBULIN SER CALC-MCNC: 3.4 G/DL (ref 2–4)
GLUCOSE SERPL-MCNC: 92 MG/DL (ref 74–108)
GLUCOSE SERPL-MCNC: 93 MG/DL (ref 74–108)
HCT VFR BLD AUTO: 46.4 % (ref 36–48)
HGB BLD-MCNC: 15.7 G/DL (ref 13–16)
IMM GRANULOCYTES # BLD AUTO: 0.02 K/UL (ref 0–0.04)
IMM GRANULOCYTES NFR BLD AUTO: 0.3 % (ref 0–0.5)
INR PPP: 1 (ref 0.9–1.1)
LYMPHOCYTES # BLD: 2.83 K/UL (ref 0.9–3.6)
LYMPHOCYTES NFR BLD: 40.8 % (ref 21–52)
MCH RBC QN AUTO: 30.1 PG (ref 24–34)
MCHC RBC AUTO-ENTMCNC: 33.8 G/DL (ref 31–37)
MCV RBC AUTO: 88.9 FL (ref 78–100)
MONOCYTES # BLD: 0.63 K/UL (ref 0.05–1.2)
MONOCYTES NFR BLD: 9.1 % (ref 3–10)
NEUTS SEG # BLD: 3.38 K/UL (ref 1.8–8)
NEUTS SEG NFR BLD: 48.7 % (ref 40–73)
NRBC # BLD: 0 K/UL (ref 0–0.01)
NRBC BLD-RTO: 0 PER 100 WBC
PLATELET # BLD AUTO: 331 K/UL (ref 135–420)
PMV BLD AUTO: 10.2 FL (ref 9.2–11.8)
POTASSIUM SERPL-SCNC: 3.8 MMOL/L (ref 3.5–5.5)
POTASSIUM SERPL-SCNC: 3.9 MMOL/L (ref 3.5–5.5)
PROT SERPL-MCNC: 7.3 G/DL (ref 6.4–8.2)
PROTHROMBIN TIME: 13.3 SEC (ref 11.9–14.9)
RBC # BLD AUTO: 5.22 M/UL (ref 4.35–5.65)
SODIUM SERPL-SCNC: 140 MMOL/L (ref 136–145)
SODIUM SERPL-SCNC: 143 MMOL/L (ref 136–145)
TROPONIN T SERPL HS-MCNC: 19.9 NG/L (ref 0–22)
WBC # BLD AUTO: 6.9 K/UL (ref 4.6–13.2)

## 2025-05-31 PROCEDURE — 70496 CT ANGIOGRAPHY HEAD: CPT

## 2025-05-31 PROCEDURE — 85610 PROTHROMBIN TIME: CPT

## 2025-05-31 PROCEDURE — 2500000003 HC RX 250 WO HCPCS: Performed by: NURSE PRACTITIONER

## 2025-05-31 PROCEDURE — 6360000004 HC RX CONTRAST MEDICATION: Performed by: NURSE PRACTITIONER

## 2025-05-31 PROCEDURE — 80053 COMPREHEN METABOLIC PANEL: CPT

## 2025-05-31 PROCEDURE — 6370000000 HC RX 637 (ALT 250 FOR IP): Performed by: NURSE PRACTITIONER

## 2025-05-31 PROCEDURE — 1100000003 HC PRIVATE W/ TELEMETRY

## 2025-05-31 PROCEDURE — 70450 CT HEAD/BRAIN W/O DYE: CPT

## 2025-05-31 PROCEDURE — 85025 COMPLETE CBC W/AUTO DIFF WBC: CPT

## 2025-05-31 PROCEDURE — 70547 MR ANGIOGRAPHY NECK W/O DYE: CPT

## 2025-05-31 PROCEDURE — 71046 X-RAY EXAM CHEST 2 VIEWS: CPT

## 2025-05-31 PROCEDURE — 70551 MRI BRAIN STEM W/O DYE: CPT

## 2025-05-31 PROCEDURE — 99285 EMERGENCY DEPT VISIT HI MDM: CPT

## 2025-05-31 PROCEDURE — 99222 1ST HOSP IP/OBS MODERATE 55: CPT | Performed by: NURSE PRACTITIONER

## 2025-05-31 PROCEDURE — 84484 ASSAY OF TROPONIN QUANT: CPT

## 2025-05-31 PROCEDURE — 6360000002 HC RX W HCPCS: Performed by: NURSE PRACTITIONER

## 2025-05-31 RX ORDER — ENOXAPARIN SODIUM 100 MG/ML
40 INJECTION SUBCUTANEOUS DAILY
Status: DISCONTINUED | OUTPATIENT
Start: 2025-05-31 | End: 2025-06-01 | Stop reason: HOSPADM

## 2025-05-31 RX ORDER — SODIUM CHLORIDE 9 MG/ML
INJECTION, SOLUTION INTRAVENOUS PRN
Status: DISCONTINUED | OUTPATIENT
Start: 2025-05-31 | End: 2025-06-01 | Stop reason: HOSPADM

## 2025-05-31 RX ORDER — TAMSULOSIN HYDROCHLORIDE 0.4 MG/1
0.4 CAPSULE ORAL DAILY
Status: DISCONTINUED | OUTPATIENT
Start: 2025-05-31 | End: 2025-06-01 | Stop reason: HOSPADM

## 2025-05-31 RX ORDER — ATORVASTATIN CALCIUM 10 MG/1
10 TABLET, FILM COATED ORAL NIGHTLY
Status: DISCONTINUED | OUTPATIENT
Start: 2025-05-31 | End: 2025-05-31

## 2025-05-31 RX ORDER — CLOPIDOGREL BISULFATE 75 MG/1
75 TABLET ORAL DAILY
Status: DISCONTINUED | OUTPATIENT
Start: 2025-06-01 | End: 2025-06-01 | Stop reason: HOSPADM

## 2025-05-31 RX ORDER — ONDANSETRON 4 MG/1
4 TABLET, ORALLY DISINTEGRATING ORAL EVERY 8 HOURS PRN
Status: DISCONTINUED | OUTPATIENT
Start: 2025-05-31 | End: 2025-06-01 | Stop reason: HOSPADM

## 2025-05-31 RX ORDER — CLOPIDOGREL 300 MG/1
300 TABLET, FILM COATED ORAL
Status: COMPLETED | OUTPATIENT
Start: 2025-05-31 | End: 2025-05-31

## 2025-05-31 RX ORDER — LISINOPRIL 40 MG/1
40 TABLET ORAL DAILY
Status: DISCONTINUED | OUTPATIENT
Start: 2025-06-01 | End: 2025-06-01 | Stop reason: HOSPADM

## 2025-05-31 RX ORDER — IOPAMIDOL 755 MG/ML
80 INJECTION, SOLUTION INTRAVASCULAR
Status: COMPLETED | OUTPATIENT
Start: 2025-05-31 | End: 2025-05-31

## 2025-05-31 RX ORDER — FINASTERIDE 5 MG/1
5 TABLET, FILM COATED ORAL DAILY
Status: DISCONTINUED | OUTPATIENT
Start: 2025-05-31 | End: 2025-06-01 | Stop reason: HOSPADM

## 2025-05-31 RX ORDER — ONDANSETRON 2 MG/ML
4 INJECTION INTRAMUSCULAR; INTRAVENOUS EVERY 6 HOURS PRN
Status: DISCONTINUED | OUTPATIENT
Start: 2025-05-31 | End: 2025-06-01 | Stop reason: HOSPADM

## 2025-05-31 RX ORDER — ASPIRIN 325 MG
325 TABLET ORAL DAILY
Status: DISCONTINUED | OUTPATIENT
Start: 2025-05-31 | End: 2025-06-01 | Stop reason: HOSPADM

## 2025-05-31 RX ORDER — SODIUM CHLORIDE 0.9 % (FLUSH) 0.9 %
5-40 SYRINGE (ML) INJECTION EVERY 12 HOURS SCHEDULED
Status: DISCONTINUED | OUTPATIENT
Start: 2025-05-31 | End: 2025-06-01 | Stop reason: HOSPADM

## 2025-05-31 RX ORDER — COLCHICINE 0.6 MG/1
0.6 CAPSULE ORAL DAILY
Status: DISCONTINUED | OUTPATIENT
Start: 2025-05-31 | End: 2025-06-01 | Stop reason: HOSPADM

## 2025-05-31 RX ORDER — SODIUM CHLORIDE 0.9 % (FLUSH) 0.9 %
5-40 SYRINGE (ML) INJECTION PRN
Status: DISCONTINUED | OUTPATIENT
Start: 2025-05-31 | End: 2025-06-01 | Stop reason: HOSPADM

## 2025-05-31 RX ORDER — ATORVASTATIN CALCIUM 40 MG/1
80 TABLET, FILM COATED ORAL NIGHTLY
Status: DISCONTINUED | OUTPATIENT
Start: 2025-05-31 | End: 2025-06-01 | Stop reason: HOSPADM

## 2025-05-31 RX ADMIN — TAMSULOSIN HYDROCHLORIDE 0.4 MG: 0.4 CAPSULE ORAL at 18:30

## 2025-05-31 RX ADMIN — COLCHICINE 0.6 MG: 0.6 CAPSULE ORAL at 18:30

## 2025-05-31 RX ADMIN — SODIUM CHLORIDE, PRESERVATIVE FREE 10 ML: 5 INJECTION INTRAVENOUS at 20:17

## 2025-05-31 RX ADMIN — IOPAMIDOL 80 ML: 755 INJECTION, SOLUTION INTRAVENOUS at 15:51

## 2025-05-31 RX ADMIN — ATORVASTATIN CALCIUM 80 MG: 40 TABLET, FILM COATED ORAL at 20:16

## 2025-05-31 RX ADMIN — FINASTERIDE 5 MG: 5 TABLET, FILM COATED ORAL at 20:17

## 2025-05-31 RX ADMIN — ENOXAPARIN SODIUM 40 MG: 100 INJECTION SUBCUTANEOUS at 17:37

## 2025-05-31 RX ADMIN — ASPIRIN 325 MG: 325 TABLET ORAL at 17:18

## 2025-05-31 RX ADMIN — CLOPIDOGREL BISULFATE 300 MG: 300 TABLET, FILM COATED ORAL at 17:18

## 2025-05-31 ASSESSMENT — PAIN - FUNCTIONAL ASSESSMENT: PAIN_FUNCTIONAL_ASSESSMENT: NONE - DENIES PAIN

## 2025-05-31 NOTE — ED PROVIDER NOTES
5.5 mmol/L    Chloride 104 98 - 107 mmol/L    CO2 27 21 - 32 mmol/L    Anion Gap 12 3.0 - 18.0 mmol/L    Glucose 92 74 - 108 mg/dL    BUN 16 6 - 23 MG/DL    Creatinine 1.13 0.6 - 1.3 MG/DL    BUN/Creatinine Ratio 15 12 - 20      Est, Glom Filt Rate 69 >60 ml/min/1.73m2    Calcium 10.2 (H) 8.5 - 10.1 MG/DL    Total Bilirubin 0.4 0.2 - 1.0 MG/DL    ALT 19 10 - 50 U/L    AST 24 10 - 38 U/L    Alk Phosphatase 97 45 - 117 U/L    Total Protein 7.3 6.4 - 8.2 g/dL    Albumin 4.0 3.4 - 5.0 g/dL    Globulin 3.4 2.0 - 4.0 g/dL    Albumin/Globulin Ratio 1.2 0.8 - 1.7     Troponin    Collection Time: 05/31/25  3:17 PM   Result Value Ref Range    Troponin T 19.9 0 - 22 ng/L   Protime-INR    Collection Time: 05/31/25  3:17 PM   Result Value Ref Range    Protime 13.3 11.9 - 14.9 sec    INR 1.0 0.9 - 1.1         Labs Reviewed   COMPREHENSIVE METABOLIC PANEL - Abnormal; Notable for the following components:       Result Value    Calcium 10.2 (*)     All other components within normal limits   CBC WITH AUTO DIFFERENTIAL   TROPONIN   PROTIME-INR   POCT GLUCOSE         EKG: When ordered, EKG's are interpreted by the Emergency Department Provider in the absence of a cardiologist.  Please see their note for interpretation of EKG.   Sinus arrhythmia please stick     RADIOLOGY:  Non-plain film images such as CT, Ultrasound and MRI are read by the radiologist. Plain radiographic images are visualized and preliminarily interpreted by the ED Provider with the below findings:          Interpretation per the Radiologist below, if available at the time of this note:  XR CHEST (2 VW)   Final Result   1. No acute cardiopulmonary pathology.      Thank you for enabling us to participate in the care of this patient.      Electronically signed by Yonny Virgen      CTA HEAD NECK W CONTRAST         CT Head W/O Contrast   Final Result      Mild periventricular white matter ischemic changes. No acute abnormalities. No   change since 10/30/2022.

## 2025-05-31 NOTE — H&P
History and Physical          Subjective     HPI: Ezio Andrea is a 70 y.o. male with a PMHx of some familial hypertension, hyperlipidemia, hepatitis C, erectile dysfunction, BPH and syncope who presented to the ED with expressive aphasia and slurred speech.  Patient was doing his usual household chores when wife asked him a question.  She noted expressive aphasia and slurred speech and called EMS.  Patient denies headache, vision changes, weakness, chest pain, and palpitations.  He endorses frequent urination and straight caths at night.  Slurred speech and expressive aphasia has resolved on my assessment, no further deficits appreciated.    Patient was treated for UTI 1 week ago and completed antibiotic, EKG workup by PCP office shows some nonspecific T abnormalities and irregular heart rate.  The cardiologist is Dr. You Singletary and was sceduled to get a Holter monitor on Monday.  Will consult cardiology while patient is in house.    ED workup revealed:  - CTA of the head: \"Mild periventricular white matter ischemic changes are seen. There is no evidence of hemorrhage, mass effect or midline shift.\"  - EKG sinus rhythm  - Calcium 10.2    He received 300 mg of Plavix and 325 mg of aspirin in ED. he will be admitted to the hospital for further TIA workup.    PMHx:  Past Medical History:   Diagnosis Date    BPH (benign prostatic hypertrophy) 10/15/2012    COVID-19     ED (erectile dysfunction) 09/30/2011    Hepatitis C     HTN (hypertension) 09/30/2011    Hyperlipidemia 09/30/2011       PSurgHx:  Past Surgical History:   Procedure Laterality Date    ANTERIOR CRUCIATE LIGAMENT REPAIR      COLONOSCOPY         SocialHx:  Social History     Socioeconomic History    Marital status:      Spouse name: None    Number of children: None    Years of education: None    Highest education level: None   Tobacco Use    Smoking status: Former     Current packs/day: 0.00     Average packs/day: 0.5 packs/day for 13.0

## 2025-05-31 NOTE — ED NOTES
Pt reports that he displayed signs of aphasia one hour ago. No fever noted. Pt also reports that he has headaches when taking his BP medications. Pt takes 40 mg of Lisinopril and 30 mg of Nifedipine. No weakness noted. Pt also reports that he was recently treated for a UTI and stopped taking ABT four days ago.

## 2025-05-31 NOTE — PROGRESS NOTES
MRI screening form needs to be filled out and faxed to [phone] BEFORE MRI can be scheduled.  If unable to obtain information from patient , [employer] needs to be contacted . If patient is claustrophobic or will needs pain meds, please have ordered in advance in order to facilitate exam.

## 2025-05-31 NOTE — ED TRIAGE NOTES
Patient brought in by medics who report that they were called to the patient's home after the patients wife found him to be having difficulty expressing himself and with some slurred speech.    EMS reports that his symptoms resolved by the time they arrived.    Patient symptoms have not returned.

## 2025-05-31 NOTE — ED NOTES
TRANSFER - OUT REPORT:    Verbal report given to AYAAN Weinstein on Ezio Andrea  being transferred to [facility] for routine progression of patient care       Report consisted of patient's Situation, Background, Assessment and   Recommendations(SBAR).     Information from the following report(s) Nurse Handoff Report, ED Encounter Summary, and ED SBAR was reviewed with the receiving nurse.    [place] Fall Assessment:    Presents to emergency department  because of falls (Syncope, seizure, or loss of consciousness): No  Age > 70: Yes  Altered Mental Status, Intoxication with alcohol or substance confusion (Disorientation, impaired judgment, poor safety awaremess, or inability to follow instructions): No  Impaired Mobility: Ambulates or transfers with assistive devices or assistance; Unable to ambulate or transer.: No  Nursing Judgement: No          Lines:   Peripheral IV Distal;Left;Anterior Cephalic (Active)       Peripheral IV 05/31/25 Right Antecubital (Active)   Site Assessment Clean, dry & intact 05/31/25 1638   Line Status Blood return noted 05/31/25 1638   Phlebitis Assessment No symptoms 05/31/25 1638   Alcohol Cap Used Yes 05/31/25 1638   Dressing Status Clean, dry & intact 05/31/25 1638        Opportunity for questions and clarification was provided.      Patient transported with:  Monitor and Registered Nurse

## 2025-06-01 ENCOUNTER — APPOINTMENT (OUTPATIENT)
Facility: HOSPITAL | Age: 70
End: 2025-06-01
Payer: MEDICARE

## 2025-06-01 VITALS
TEMPERATURE: 98.1 F | SYSTOLIC BLOOD PRESSURE: 134 MMHG | OXYGEN SATURATION: 99 % | DIASTOLIC BLOOD PRESSURE: 72 MMHG | RESPIRATION RATE: 17 BRPM | BODY MASS INDEX: 23.86 KG/M2 | HEIGHT: 67 IN | WEIGHT: 152 LBS | HEART RATE: 70 BPM

## 2025-06-01 PROBLEM — N40.0 BPH (BENIGN PROSTATIC HYPERPLASIA): Chronic | Status: ACTIVE | Noted: 2022-10-30

## 2025-06-01 LAB
ANION GAP SERPL CALC-SCNC: 10 MMOL/L (ref 3–18)
BUN SERPL-MCNC: 15 MG/DL (ref 6–23)
BUN/CREAT SERPL: 14 (ref 12–20)
CALCIUM SERPL-MCNC: 9.5 MG/DL (ref 8.5–10.1)
CHLORIDE SERPL-SCNC: 106 MMOL/L (ref 98–107)
CHOLEST SERPL-MCNC: 149 MG/DL
CO2 SERPL-SCNC: 25 MMOL/L (ref 21–32)
CREAT SERPL-MCNC: 1.07 MG/DL (ref 0.6–1.3)
ECHO AO ASC DIAM: 2.7 CM
ECHO AO ASCENDING AORTA INDEX: 1.5 CM/M2
ECHO AO ROOT DIAM: 3.2 CM
ECHO AO ROOT INDEX: 1.78 CM/M2
ECHO AV AREA PEAK VELOCITY: 2.6 CM2
ECHO AV AREA VTI: 2.7 CM2
ECHO AV AREA/BSA PEAK VELOCITY: 1.4 CM2/M2
ECHO AV AREA/BSA VTI: 1.5 CM2/M2
ECHO AV MEAN GRADIENT: 2 MMHG
ECHO AV MEAN VELOCITY: 0.7 M/S
ECHO AV PEAK GRADIENT: 5 MMHG
ECHO AV PEAK VELOCITY: 1.1 M/S
ECHO AV VELOCITY RATIO: 0.82
ECHO AV VTI: 22.8 CM
ECHO BSA: 1.81 M2
ECHO EST RA PRESSURE: 3 MMHG
ECHO LA VOL A-L A2C: 37 ML (ref 18–58)
ECHO LA VOL A-L A4C: 22 ML (ref 18–58)
ECHO LA VOL BP: 28 ML (ref 18–58)
ECHO LA VOL MOD A2C: 36 ML (ref 18–58)
ECHO LA VOL MOD A4C: 20 ML (ref 18–58)
ECHO LA VOL/BSA BIPLANE: 16 ML/M2 (ref 16–34)
ECHO LA VOLUME AREA LENGTH: 30 ML
ECHO LA VOLUME INDEX A-L A2C: 21 ML/M2 (ref 16–34)
ECHO LA VOLUME INDEX A-L A4C: 12 ML/M2 (ref 16–34)
ECHO LA VOLUME INDEX AREA LENGTH: 17 ML/M2 (ref 16–34)
ECHO LA VOLUME INDEX MOD A2C: 20 ML/M2 (ref 16–34)
ECHO LA VOLUME INDEX MOD A4C: 11 ML/M2 (ref 16–34)
ECHO LV E' LATERAL VELOCITY: 10.93 CM/S
ECHO LV E' SEPTAL VELOCITY: 8.3 CM/S
ECHO LV EDV A2C: 72 ML
ECHO LV EDV A4C: 77 ML
ECHO LV EDV BP: 76 ML (ref 67–155)
ECHO LV EDV INDEX A4C: 43 ML/M2
ECHO LV EDV INDEX BP: 42 ML/M2
ECHO LV EDV NDEX A2C: 40 ML/M2
ECHO LV EF PHYSICIAN: 65 %
ECHO LV EJECTION FRACTION A2C: 67 %
ECHO LV EJECTION FRACTION A4C: 70 %
ECHO LV EJECTION FRACTION BIPLANE: 69 % (ref 55–100)
ECHO LV ESV A2C: 24 ML
ECHO LV ESV A4C: 23 ML
ECHO LV ESV BP: 24 ML (ref 22–58)
ECHO LV ESV INDEX A2C: 13 ML/M2
ECHO LV ESV INDEX A4C: 13 ML/M2
ECHO LV ESV INDEX BP: 13 ML/M2
ECHO LV FRACTIONAL SHORTENING: 31 % (ref 28–44)
ECHO LV INTERNAL DIMENSION DIASTOLE INDEX: 2.67 CM/M2
ECHO LV INTERNAL DIMENSION DIASTOLIC: 4.8 CM (ref 4.2–5.9)
ECHO LV INTERNAL DIMENSION SYSTOLIC INDEX: 1.83 CM/M2
ECHO LV INTERNAL DIMENSION SYSTOLIC: 3.3 CM
ECHO LV IVSD: 0.7 CM (ref 0.6–1)
ECHO LV MASS 2D: 106.9 G (ref 88–224)
ECHO LV MASS INDEX 2D: 59.4 G/M2 (ref 49–115)
ECHO LV POSTERIOR WALL DIASTOLIC: 0.7 CM (ref 0.6–1)
ECHO LV RELATIVE WALL THICKNESS RATIO: 0.29
ECHO LVOT AREA: 3.1 CM2
ECHO LVOT AV VTI INDEX: 0.82
ECHO LVOT DIAM: 2 CM
ECHO LVOT MEAN GRADIENT: 1 MMHG
ECHO LVOT PEAK GRADIENT: 3 MMHG
ECHO LVOT PEAK VELOCITY: 0.9 M/S
ECHO LVOT STROKE VOLUME INDEX: 32.6 ML/M2
ECHO LVOT SV: 58.7 ML
ECHO LVOT VTI: 18.7 CM
ECHO MV A VELOCITY: 0.74 M/S
ECHO MV E DECELERATION TIME (DT): 182.6 MS
ECHO MV E VELOCITY: 0.75 M/S
ECHO MV E/A RATIO: 1.01
ECHO MV E/E' LATERAL: 6.86
ECHO MV E/E' RATIO (AVERAGED): 7.95
ECHO MV E/E' SEPTAL: 9.04
ECHO PV MAX VELOCITY: 0.9 M/S
ECHO PV PEAK GRADIENT: 3 MMHG
ECHO RA VOLUME: 32 ML
ECHO RA VOLUME: 35 ML
ECHO RIGHT VENTRICULAR SYSTOLIC PRESSURE (RVSP): 33 MMHG
ECHO RV BASAL DIMENSION: 3.7 CM
ECHO RV FREE WALL PEAK S': 12.3 CM/S
ECHO RV MID DIMENSION: 2.4 CM
ECHO RV TAPSE: 2.1 CM (ref 1.7–?)
ECHO TV REGURGITANT MAX VELOCITY: 2.72 M/S
ECHO TV REGURGITANT PEAK GRADIENT: 30 MMHG
EKG ATRIAL RATE: 94 BPM
EKG DIAGNOSIS: NORMAL
EKG P AXIS: 60 DEGREES
EKG P-R INTERVAL: 130 MS
EKG Q-T INTERVAL: 336 MS
EKG QRS DURATION: 86 MS
EKG QTC CALCULATION (BAZETT): 420 MS
EKG R AXIS: 58 DEGREES
EKG T AXIS: 66 DEGREES
EKG VENTRICULAR RATE: 94 BPM
ERYTHROCYTE [DISTWIDTH] IN BLOOD BY AUTOMATED COUNT: 12.4 % (ref 11.6–14.5)
EST. AVERAGE GLUCOSE BLD GHB EST-MCNC: 120 MG/DL
GLUCOSE SERPL-MCNC: 101 MG/DL (ref 74–108)
HBA1C MFR BLD: 5.8 % (ref 4.2–5.6)
HCT VFR BLD AUTO: 42.8 % (ref 36–48)
HDLC SERPL-MCNC: 37 MG/DL (ref 40–60)
HDLC SERPL: 4 (ref 0–5)
HGB BLD-MCNC: 14.1 G/DL (ref 13–16)
LDLC SERPL CALC-MCNC: 96 MG/DL (ref 0–100)
MCH RBC QN AUTO: 29.8 PG (ref 24–34)
MCHC RBC AUTO-ENTMCNC: 32.9 G/DL (ref 31–37)
MCV RBC AUTO: 90.5 FL (ref 78–100)
NRBC # BLD: 0 K/UL (ref 0–0.01)
NRBC BLD-RTO: 0 PER 100 WBC
PLATELET # BLD AUTO: 281 K/UL (ref 135–420)
PMV BLD AUTO: 10.3 FL (ref 9.2–11.8)
POTASSIUM SERPL-SCNC: 4 MMOL/L (ref 3.5–5.5)
RBC # BLD AUTO: 4.73 M/UL (ref 4.35–5.65)
SODIUM SERPL-SCNC: 141 MMOL/L (ref 136–145)
TRIGL SERPL-MCNC: 79 MG/DL (ref 0–150)
VLDLC SERPL CALC-MCNC: 16 MG/DL
WBC # BLD AUTO: 5.5 K/UL (ref 4.6–13.2)

## 2025-06-01 PROCEDURE — 6370000000 HC RX 637 (ALT 250 FOR IP): Performed by: NURSE PRACTITIONER

## 2025-06-01 PROCEDURE — 80048 BASIC METABOLIC PNL TOTAL CA: CPT

## 2025-06-01 PROCEDURE — 93306 TTE W/DOPPLER COMPLETE: CPT | Performed by: INTERNAL MEDICINE

## 2025-06-01 PROCEDURE — 83036 HEMOGLOBIN GLYCOSYLATED A1C: CPT

## 2025-06-01 PROCEDURE — 85027 COMPLETE CBC AUTOMATED: CPT

## 2025-06-01 PROCEDURE — 93306 TTE W/DOPPLER COMPLETE: CPT

## 2025-06-01 PROCEDURE — 36415 COLL VENOUS BLD VENIPUNCTURE: CPT

## 2025-06-01 PROCEDURE — 80061 LIPID PANEL: CPT

## 2025-06-01 PROCEDURE — 97162 PT EVAL MOD COMPLEX 30 MIN: CPT

## 2025-06-01 PROCEDURE — 2500000003 HC RX 250 WO HCPCS: Performed by: NURSE PRACTITIONER

## 2025-06-01 PROCEDURE — 97165 OT EVAL LOW COMPLEX 30 MIN: CPT

## 2025-06-01 RX ORDER — ATORVASTATIN CALCIUM 40 MG/1
40 TABLET, FILM COATED ORAL DAILY
Qty: 30 TABLET | Refills: 3 | Status: SHIPPED | OUTPATIENT
Start: 2025-06-01 | End: 2025-06-04 | Stop reason: SDUPTHER

## 2025-06-01 RX ORDER — CLOPIDOGREL BISULFATE 75 MG/1
75 TABLET ORAL DAILY
Qty: 30 TABLET | Refills: 0 | Status: SHIPPED | OUTPATIENT
Start: 2025-06-01 | End: 2025-06-04 | Stop reason: SDUPTHER

## 2025-06-01 RX ORDER — ATORVASTATIN CALCIUM 10 MG/1
40 TABLET, FILM COATED ORAL DAILY
Qty: 90 TABLET | Refills: 0 | Status: SHIPPED | OUTPATIENT
Start: 2025-06-01 | End: 2025-06-04 | Stop reason: DRUGHIGH

## 2025-06-01 RX ADMIN — SODIUM CHLORIDE, PRESERVATIVE FREE 10 ML: 5 INJECTION INTRAVENOUS at 08:59

## 2025-06-01 RX ADMIN — COLCHICINE 0.6 MG: 0.6 CAPSULE ORAL at 08:57

## 2025-06-01 RX ADMIN — LISINOPRIL 40 MG: 40 TABLET ORAL at 08:57

## 2025-06-01 RX ADMIN — CLOPIDOGREL BISULFATE 75 MG: 75 TABLET, FILM COATED ORAL at 08:57

## 2025-06-01 RX ADMIN — ASPIRIN 325 MG: 325 TABLET ORAL at 08:57

## 2025-06-01 ASSESSMENT — PAIN - FUNCTIONAL ASSESSMENT: PAIN_FUNCTIONAL_ASSESSMENT: ACTIVITIES ARE NOT PREVENTED

## 2025-06-01 ASSESSMENT — PAIN DESCRIPTION - PAIN TYPE: TYPE: CHRONIC PAIN

## 2025-06-01 ASSESSMENT — PAIN DESCRIPTION - FREQUENCY: FREQUENCY: INTERMITTENT

## 2025-06-01 ASSESSMENT — PAIN DESCRIPTION - DESCRIPTORS: DESCRIPTORS: CRAMPING

## 2025-06-01 ASSESSMENT — PAIN SCALES - GENERAL
PAINLEVEL_OUTOF10: 3
PAINLEVEL_OUTOF10: 1

## 2025-06-01 ASSESSMENT — PAIN DESCRIPTION - ORIENTATION: ORIENTATION: RIGHT;LEFT

## 2025-06-01 ASSESSMENT — PAIN DESCRIPTION - LOCATION: LOCATION: FOOT

## 2025-06-01 NOTE — PROGRESS NOTES
Advance Care Planning   Healthcare Decision Maker:    Primary Decision Maker (Active): Pilar Andrea - Spouse - [phone]    Today we documented Decision Maker(s) consistent with Legal Next of Kin hierarchy.       Spiritual Health History and Assessment/Progress Note  [facility]    Spiritual/Emotional Needs, Advance Care Planning,  ,  ,      Name: Ezio Andrea MRN: [id number]    Age: 70 y.o.     Sex: male   Language: English   Zoroastrian: Uatsdin   TIA (transient ischemic attack)     Date: 6/1/2025            Total Time Calculated: 7 min              Spiritual Assessment began in [address] MEDICAL        Referral/Consult From: Multi-disciplinary team   Encounter Overview/Reason: Spiritual/Emotional Needs, Advance Care Planning  Service Provided For: Patient    Sanjuana, Belief, Meaning:   Patient has beliefs or practices that help with coping during difficult times  Family/Friends No family/friends present      Importance and Influence:  Patient has spiritual/personal beliefs that influence decisions regarding their health  Family/Friends No family/friends present    Community:  Patient feels well-supported. Support system includes: Spouse/Partner  Family/Friends No family/friends present    Assessment and Plan of Care:     Patient Interventions include: Facilitated expression of thoughts and feelings and Affirmed coping skills/support systems  Family/Friends Interventions include: No family/friends present    Patient Plan of Care: Spiritual Care available upon further referral  Family/Friends Plan of Care: Spiritual Care available upon further referral    Electronically signed by CLAUDIA Phan on 6/1/2025 at 2:23 PM

## 2025-06-01 NOTE — PLAN OF CARE
Problem: Discharge Planning  Goal: Discharge to home or other facility with appropriate resources  Outcome: Progressing  Flowsheets (Taken 6/1/2025 1255)  Discharge to home or other facility with appropriate resources: Identify barriers to discharge with patient and caregiver  Note: Identify barriers prior to discharge home.     Problem: Safety - Adult  Goal: Free from fall injury  6/1/2025 1255 by Corinna Dangelo RN  Outcome: Progressing  Flowsheets (Taken 6/1/2025 0047 by Lisa Hutton, RN)  Free From Fall Injury: Instruct family/caregiver on patient safety  Note: Standard safety precautions.  6/1/2025 0047 by Lisa Hutton RN  Outcome: Progressing  Flowsheets (Taken 6/1/2025 0047)  Free From Fall Injury: Instruct family/caregiver on patient safety     Problem: Neurosensory - Adult  Goal: Achieves stable or improved neurological status  6/1/2025 1255 by Corinna Dangelo RN  Outcome: Progressing  Flowsheets (Taken 6/1/2025 0047 by Lisa Hutton, RN)  Achieves stable or improved neurological status: Assess for and report changes in neurological status  Note: Reassess patient neuro status.  6/1/2025 0047 by Lisa Hutton RN  Outcome: Progressing  Flowsheets (Taken 6/1/2025 0047)  Achieves stable or improved neurological status: Assess for and report changes in neurological status  Goal: Achieves maximal functionality and self care  6/1/2025 1255 by Corinna Dangelo RN  Outcome: Progressing  Note: Monitor airway and swallowing with patient.  6/1/2025 0047 by Lisa Hutton RN  Outcome: Progressing  Flowsheets (Taken 6/1/2025 0047)  Achieves maximal functionality and self care: Monitor swallowing and airway patency with patient fatigue and changes in neurological status     Problem: Musculoskeletal - Adult  Goal: Return mobility to safest level of function  Outcome: Progressing  Flowsheets (Taken 6/1/2025 1255)  Return Mobility to Safest Level of Function: Assess patient stability and activity tolerance

## 2025-06-01 NOTE — DISCHARGE SUMMARY
10/30/2022     INDICATIONS: Stroke. Slurred speech.     TECHNIQUE:  Axial sections through the brain at 5 mm collimation without IV  contrast are obtained.     All CT scans at this facility are performed using dose optimization technique as  appropriate to a performed exam, to include automated exposure control,  adjustment of the mA and/or KV according to patient's size (including  appropriate matching for site-specific examinations), or use of iterative  reconstruction technique.     FINDINGS: Mild periventricular white matter ischemic changes are seen. There is  no evidence of hemorrhage, mass effect or midline shift. No extra-axial fluid  collections are seen. No significant osseous abnormalities. Sinuses are normal  in appearance. Carotid artery calcifications are seen bilaterally.     No hemorrhage identified.  No mass lesion identified.  No acute infarction identified.     IMPRESSION:     Mild periventricular white matter ischemic changes. No acute abnormalities. No  change since 10/30/2022.     Emergency room contacted at 1337 hours.     Electronically signed by Yohannes Diaz        Exam Ended: 05/31/25 15:29 EDT Last Resulted: 05/31/25 15:37 EDT         CTA HEAD NECK W CONTRAST  Order: [id number]   Status: Preliminary result       Next appt: 07/08/2025 at 01:40 PM in Urology (STEPHANIE JACKMAN)    Test Result Released: No    0 Result Notes  Details    Reading Physician Reading Date Result Priority   Kiran Segundo MD  [phone]     5/31/2025 STAT     Narrative & Impression  PRELIMINARY REPORT  CTA HEAD AND NECK  SLURRED SPEECH  COMPARISON To MRA OF THE NECK AND BRAIN ARTERIES 10/30/2022     PRELIMINARY FINDINGS:  -Patent cervical carotid and vertebral arteries.  -Atherosclerotic plaque at both carotid bifurcations, but less than 50% ICA  origin narrowing.  -Major intracranial arteries are patent, no large vessel occlusion  -There is segmental stenosis in the M1 segment left MCA. The

## 2025-06-01 NOTE — PROGRESS NOTES
Occupational Therapy  OCCUPATIONAL THERAPY EVALUATION/DISCHARGE    Patient: Ezio Andrea (70 y.o. male)  Date: 6/1/2025  Primary Diagnosis: TIA (transient ischemic attack) [G45.9]  Precautions: General Precautions (Simultaneous filing. User may not have seen previous data.)  PLOF: Independent      ASSESSMENT AND RECOMMENDATIONS:  Cleared for OT evaluation and pt agreeable to participate. Pt presents at baseline functioning. Pt independent in all bed mobility, functional mobility, and ADL tasks. No acute deficits warranting continued occupational therapy at current level of care. Will sign off.     Further Equipment Recommendations for Discharge: None    AMPA: AM-PAC Inpatient Daily Activity Raw Score: 24    Current research shows that an AM-PAC score of 18 or greater is associated with a discharge to the patient's home setting.    This [facility] score should be considered in conjunction with interdisciplinary team recommendations to determine the most appropriate discharge setting. Patient's social support, diagnosis, medical stability, and prior level of function should also be taken into consideration.     SUBJECTIVE:   Patient stated “I am feeling a lot better today.”    OBJECTIVE DATA SUMMARY:     Past Medical History:   Diagnosis Date    BPH (benign prostatic hypertrophy) 10/15/2012    COVID-19     ED (erectile dysfunction) 09/30/2011    Hepatitis C     HTN (hypertension) 09/30/2011    Hyperlipidemia 09/30/2011     Past Surgical History:   Procedure Laterality Date    ANTERIOR CRUCIATE LIGAMENT REPAIR      COLONOSCOPY       Home Situation:   Social/Functional History  Lives With: Spouse (Simultaneous filing. User may not have seen previous data.)  Type of Home: House (Simultaneous filing. User may not have seen previous data.)  Home Layout: One level  Bathroom Shower/Tub: Tub/Shower unit  Bathroom Toilet: Standard  Home Equipment: None  Prior Level of Assist for ADLs: Independent  Prior Level of Assist for

## 2025-06-01 NOTE — PROGRESS NOTES
Physical Therapy    PHYSICAL THERAPY EVALUATION/DISCHARGE    Patient: Ezio Andrea (70 y.o. male)  Date: 6/1/2025  Primary Diagnosis: TIA (transient ischemic attack) [G45.9]       Precautions: General Precautions (Simultaneous filing. User may not have seen previous data.)    PLOF: pt lives with spouse, ind PTA      ASSESSMENT AND RECOMMENDATIONS:  Pt received in bed in NAD and agreeable. Speech appears WNL. No numbness and tingling noted. Pt ROM and strength intact. Independent with all functional mobility with no AD. Does not require further acute PT. RN notified of reported HA 5/10 on L side. Will sign off.     Patient does not require further skilled physical therapy intervention at this level of care.    Further Equipment Recommendations for Discharge: n/a     [facility]: AM-[facility] Inpatient Mobility Raw Score : 24      At this time and based on an AM-PAC score, no further PT is recommended upon discharge.  Recommend patient returns to prior setting with prior services.    This [facility] score should be considered in conjunction with interdisciplinary team recommendations to determine the most appropriate discharge setting. Patient's social support, diagnosis, medical stability, and prior level of function should also be taken into consideration.     SUBJECTIVE:   Patient stated “I have a headache.”    OBJECTIVE DATA SUMMARY:     Past Medical History:   Diagnosis Date    BPH (benign prostatic hypertrophy) 10/15/2012    COVID-19     ED (erectile dysfunction) 09/30/2011    Hepatitis C     HTN (hypertension) 09/30/2011    Hyperlipidemia 09/30/2011     Past Surgical History:   Procedure Laterality Date    ANTERIOR CRUCIATE LIGAMENT REPAIR      COLONOSCOPY         Home Situation:  Social/Functional History  Lives With: Spouse (Simultaneous filing. User may not have seen previous data.)  Type of Home: House (Simultaneous filing. User may not have seen previous data.)  Home Layout: One level  Bathroom Shower/Tub: Tub/Shower

## 2025-06-01 NOTE — PLAN OF CARE
Problem: Discharge Planning  Goal: Discharge to home or other facility with appropriate resources  6/1/2025 1432 by Corinna Dangelo RN  Outcome: Completed  6/1/2025 1255 by Corinna Dangelo RN  Outcome: Progressing  Flowsheets (Taken 6/1/2025 1255)  Discharge to home or other facility with appropriate resources: Identify barriers to discharge with patient and caregiver  Note: Identify barriers prior to discharge home.     Problem: Safety - Adult  Goal: Free from fall injury  6/1/2025 1432 by Corinna Dangelo RN  Outcome: Completed  6/1/2025 1255 by Corinna Dangelo RN  Outcome: Progressing  Flowsheets (Taken 6/1/2025 0047 by Lisa Hutton, RN)  Free From Fall Injury: Instruct family/caregiver on patient safety  Note: Standard safety precautions.  6/1/2025 0047 by Lisa Hutton RN  Outcome: Progressing  Flowsheets (Taken 6/1/2025 0047)  Free From Fall Injury: Instruct family/caregiver on patient safety     Problem: Neurosensory - Adult  Goal: Achieves stable or improved neurological status  6/1/2025 1432 by Corinna Dangelo RN  Outcome: Completed  6/1/2025 1255 by Corinna Dangelo RN  Outcome: Progressing  Flowsheets (Taken 6/1/2025 0047 by Lisa Hutton, RN)  Achieves stable or improved neurological status: Assess for and report changes in neurological status  Note: Reassess patient neuro status.  6/1/2025 0047 by Lisa Hutton RN  Outcome: Progressing  Flowsheets (Taken 6/1/2025 0047)  Achieves stable or improved neurological status: Assess for and report changes in neurological status  Goal: Achieves maximal functionality and self care  6/1/2025 1432 by Corinna Dangelo RN  Outcome: Completed  6/1/2025 1255 by Corinna Dangelo RN  Outcome: Progressing  Note: Monitor airway and swallowing with patient.  6/1/2025 0047 by Lisa Hutton RN  Outcome: Progressing  Flowsheets (Taken 6/1/2025 0047)  Achieves maximal functionality and self care: Monitor swallowing and airway patency with patient fatigue and changes in

## 2025-06-01 NOTE — DISCHARGE INSTRUCTIONS
Diet:  Low Fat/Cholesterol, Low Sodium (2.0 Grams/day) Diet    Discharge Instructions:  Take Dual Antiplatelet Therapy (a.k.a. DAPT) for 1 month duration and then continuing taking only Aspirin.  Follow-up with your Primary Care Provider (a.k.a. PCP) as scheduled or in ~1 week---if you have to make an appointment, tell them you need to see them for \"Post-Acute Care.\"  Complete Holter Monitor work-up, previously ordered.    Discharge Recommendations:  If you should experience sudden vision changes, blurriness of vision, acute vision loss, hearing loss, dizziness (with the sensation that you or your environment are spinning/moving when they are not), difficulty swallowing, difficulty speaking words, inability to process other peoples' speech, inability to speak, weakness in one arm/leg/half of body, loss of sensation, and/or slurring of speech, yo  DISCHARGE SUMMARY from Nurse    PATIENT INSTRUCTIONS:      What to do at Home:  Recommended activity: activity as tolerated, Rest    If you experience any of the following symptoms Facial Droop; Slurred speech; Changes in vision, , please follow up with Emergency department; PCP.    *  Please give a list of your current medications to your Primary Care Provider.    *  Please update this list whenever your medications are discontinued, doses are      changed, or new medications (including over-the-counter products) are added.    *  Please carry medication information at all times in case of emergency situations.    These are general instructions for a healthy lifestyle:    No smoking/ No tobacco products/ Avoid exposure to second hand smoke  Surgeon General's Warning:  Quitting smoking now greatly reduces serious risk to your health.    Obesity, smoking, and sedentary lifestyle greatly increases your risk for illness    A healthy diet, regular physical exercise & weight monitoring are important for maintaining a healthy lifestyle    You may be retaining fluid if you have a

## 2025-06-01 NOTE — PROGRESS NOTES
Patient has been discharged and sent home with spouse. There were no further questions and this nurse wheeled patient down to car.

## 2025-06-01 NOTE — FLOWSHEET NOTE
06/01/25 1503   AVS Reviewed   AVS & discharge instructions reviewed with patient and/or representative? Yes   Reviewed instructions with Patient   Level of Understanding Questions answered;Verbalized understanding

## 2025-06-01 NOTE — CONSULTS
MSW notified patient's spouse  that he is cleared for discharge.  She reports she wants to speak with discharging MD due to concerns about wanting patient to see neurology in person, as opposed to via telehealth.  MSW notified MD via perfect serve that wife would like updates on patient's neurological status.  MSW also met with patient at bedside to provide IMM letter and advise patient that he and wife do have the right to appeal discharge.IMM letter signed and placed in chart.  Patient acknowledged an understanding and will wait until wife speaks with MD before appealing discharge.         06/01/25 1240   Service Assessment   Patient Orientation Alert and Oriented   Cognition Alert   History Provided By Patient   Primary Caregiver Self   Support Systems Spouse/Significant Other   Patient's Healthcare Decision Maker is: Legal Next of Kin   PCP Verified by CM Yes   Prior Functional Level Independent in ADLs/IADLs   Current Functional Level Independent in ADLs/IADLs   Can patient return to prior living arrangement Yes   Ability to make needs known: Good   Family able to assist with home care needs: Yes   Would you like for me to discuss the discharge plan with any other family members/significant others, and if so, who? Yes  (Spouse)   Financial Resources Medicare   Social/Functional History   Lives With Spouse   Type of Home House   Home Layout One level   Bathroom Shower/Tub Tub/Shower unit   Bathroom Toilet Standard   Home Equipment None   Prior Level of Assist for ADLs Independent   Prior Level of Assist for Homemaking Independent   Homemaking Responsibilities Yes   Ambulation Assistance Independent   Prior Level of Assist for Transfers Independent   Active  Yes   Mode of Transportation Car   Occupation Retired   Discharge Planning   Living Arrangements Spouse/Significant Other   Potential Assistance Needed N/A   DME Ordered? No   Patient expects to be discharged to: House   Services At/After

## 2025-06-01 NOTE — PLAN OF CARE
Problem: Safety - Adult  Goal: Free from fall injury  Outcome: Progressing  Flowsheets (Taken 6/1/2025 0047)  Free From Fall Injury: Instruct family/caregiver on patient safety     Problem: Neurosensory - Adult  Goal: Achieves stable or improved neurological status  Outcome: Progressing  Flowsheets (Taken 6/1/2025 0047)  Achieves stable or improved neurological status: Assess for and report changes in neurological status  Goal: Achieves maximal functionality and self care  Outcome: Progressing  Flowsheets (Taken 6/1/2025 0047)  Achieves maximal functionality and self care: Monitor swallowing and airway patency with patient fatigue and changes in neurological status

## 2025-06-01 NOTE — PROGRESS NOTES
SLP ALL NOTES  Speech Therapy:    Evaluation orders received. Upon completion of chart review and discussion with RN, pt not appropriate for SLP evaluation this date.  Currently, patient is:    [x] Tolerating current po diet (per RN report)  [] Tolerating current po diet; however, poor po intake (per RN report)   [] Receiving nutrition via tube feeding   [] Lethargic, solomnent, or difficult to arouse for safe po trials     [] Unable to manage secretions  [] Receiving intervention for respiratory distress  [] < 6 hours s/p extubation   [] Other:     Please contact SLP with questions/concerns.  SLP will follow up next day.    Thank you for the referral.     Minna Fields M.A. CCC-SLP

## 2025-06-01 NOTE — FLOWSHEET NOTE
06/01/25 1450   Belongings   Dental Appliances None   Vision - Corrective Lenses Eyeglasses   Hearing Aid None   Clothing Pants;Socks;Footwear   Jewelry Earrings   Body Piercings Removed No   Electronic Devices Cell Phone   Weapons (Notify Protective Services/Security) None   Home Medications None   Valuables Given To Patient   Orientation to Room   Patient/Responsible Party informed of Rights and Responsibilities Yes   Orientation to Room Performed Yes

## 2025-06-02 ENCOUNTER — TELEPHONE (OUTPATIENT)
Facility: CLINIC | Age: 70
End: 2025-06-02

## 2025-06-02 ENCOUNTER — CLINICAL DOCUMENTATION (OUTPATIENT)
Facility: CLINIC | Age: 70
End: 2025-06-02

## 2025-06-02 ENCOUNTER — TELEPHONE (OUTPATIENT)
Age: 70
End: 2025-06-02

## 2025-06-02 NOTE — TELEPHONE ENCOUNTER
States pt had mini stroke on Saturday. Has holter monitor on for 14 days. Would like to see if Dr. Singletary could get  a sooner appt with Neurology. Scheduled for Feb at the moment.

## 2025-06-02 NOTE — PROGRESS NOTES
PATIENT REQUIRES TCM OUTREACH    MRN: [id number]     PATIENT:  Ezio Andrea    ADMIT DATE:  5/31/25     DISCHARGE DATE:  6/1/25     ADMITTING DX: TIA    MEDICATION CHANGES:   Start: Plavix  Stop:   Change: Lipitor    SPECIALISTS:  Neuro    HOME HEALTH/WOUND CARE/PT/OT:  none    SCHEDULED FOLLOW UP APPTS:    Future Appointments   Date Time Provider Department Center   7/8/2025  1:40 PM Edilma Lou, [facility]   7/23/2025  3:20 PM You Singletary MD [facility]   8/25/2025  2:30 PM Gabriel Mai MD [facility]       *Please contact patient within 2 business days and document under .TCMOFFICE.  A scheduled Hospital Follow-up for patient within 7 days is preferred*

## 2025-06-02 NOTE — TELEPHONE ENCOUNTER
Care Transitions Initial Follow Up Call    Outreach made within 2 business days of discharge: Yes    Patient: Ezio Andrea Patient : 1955   MRN: [id number]  Reason for Admission: tia  Discharge Date: 25       Spoke with: patient    Discharge department/facility: [facility] Interactive Patient Contact:  Was patient able to fill all prescriptions: Yes  Was patient instructed to bring all medications to the follow-up visit: Yes  Is patient taking all medications as directed in the discharge summary? Yes  Does patient understand their discharge instructions: No: yes  Does patient have questions or concerns that need addressed prior to 7-14 day follow up office visit: yes - yes    Additional needs identified to be addressed with provider  No needs identified             Scheduled appointment with PCP within 7-14 days    Follow Up  Future Appointments   Date Time Provider Department Center   2025  2:30 PM Gabriel Mai MD [facility] DEP   2025  1:40 PM Edilma Lou [facility]   2025  3:20 PM You Singletary MD [facility]   2025  2:30 PM Gabriel Mai MD [facility] DEP       Camelia Aceves MA

## 2025-06-04 ENCOUNTER — TELEPHONE (OUTPATIENT)
Facility: CLINIC | Age: 70
End: 2025-06-04

## 2025-06-04 ENCOUNTER — TELEMEDICINE (OUTPATIENT)
Facility: CLINIC | Age: 70
End: 2025-06-04

## 2025-06-04 DIAGNOSIS — R00.2 PALPITATIONS: ICD-10-CM

## 2025-06-04 DIAGNOSIS — Z09 HOSPITAL DISCHARGE FOLLOW-UP: ICD-10-CM

## 2025-06-04 DIAGNOSIS — E78.5 HYPERLIPIDEMIA, UNSPECIFIED HYPERLIPIDEMIA TYPE: ICD-10-CM

## 2025-06-04 DIAGNOSIS — R39.9 LOWER URINARY TRACT SYMPTOMS (LUTS): ICD-10-CM

## 2025-06-04 DIAGNOSIS — I10 ESSENTIAL (PRIMARY) HYPERTENSION: ICD-10-CM

## 2025-06-04 DIAGNOSIS — G45.9 TIA (TRANSIENT ISCHEMIC ATTACK): Primary | ICD-10-CM

## 2025-06-04 DIAGNOSIS — M10.9 GOUT, UNSPECIFIED CAUSE, UNSPECIFIED CHRONICITY, UNSPECIFIED SITE: ICD-10-CM

## 2025-06-04 RX ORDER — CLOPIDOGREL BISULFATE 75 MG/1
75 TABLET ORAL DAILY
Qty: 90 TABLET | Refills: 1 | Status: SHIPPED | OUTPATIENT
Start: 2025-06-04

## 2025-06-04 RX ORDER — ATORVASTATIN CALCIUM 40 MG/1
40 TABLET, FILM COATED ORAL DAILY
Qty: 90 TABLET | Refills: 1 | Status: SHIPPED | OUTPATIENT
Start: 2025-06-04

## 2025-06-04 RX ORDER — HYDROCHLOROTHIAZIDE 12.5 MG/1
12.5 CAPSULE ORAL EVERY MORNING
Qty: 30 CAPSULE | Refills: 5 | Status: SHIPPED | OUTPATIENT
Start: 2025-06-04

## 2025-06-04 ASSESSMENT — PATIENT HEALTH QUESTIONNAIRE - PHQ9
SUM OF ALL RESPONSES TO PHQ QUESTIONS 1-9: 0
1. LITTLE INTEREST OR PLEASURE IN DOING THINGS: NOT AT ALL
SUM OF ALL RESPONSES TO PHQ QUESTIONS 1-9: 0
2. FEELING DOWN, DEPRESSED OR HOPELESS: NOT AT ALL

## 2025-06-04 NOTE — PROGRESS NOTES
Have you been to the ER, urgent care clinic since your last visit?  Hospitalized since your last visit?   YES - When: approximately 6 days ago.  Where and Why: [facility].    Have you seen or consulted any other health care providers outside our system since your last visit?   NO           
MD HARRIS   NIFEdipine (PROCARDIA XL) 30 MG extended release tablet TAKE 1 TABLET BY MOUTH DAILY Yes Gabriel Mai MD   Multiple Vitamin (MULTIVITAMIN PO) Take 1 tablet by mouth daily Yes Automatic Reconciliation, Ar   alfuzosin (UROXATRAL) 10 MG extended release tablet Take 1 tablet by mouth daily  Patient not taking: Reported on 2025  Konstantin Edilma Chen, FNP   aspirin 81 MG EC tablet Take 1 tablet by mouth daily  Patient not taking: Reported on 2025  Provider, MD Veronique       Social History     Tobacco Use    Smoking status: Former     Current packs/day: 0.00     Average packs/day: 0.5 packs/day for 13.0 years (6.5 ttl pk-yrs)     Types: Cigarettes     Start date: 1972     Quit date: 1985     Years since quittin.4     Passive exposure: Past    Smokeless tobacco: Never   Vaping Use    Vaping status: Never Used   Substance Use Topics    Alcohol use: Not Currently     Alcohol/week: 1.0 standard drink of alcohol     Types: 1 Standard drinks or equivalent per week    Drug use: No        PHYSICAL EXAMINATION:  Constitutional: [x] Appears well-developed and well-nourished [] No apparent distress      [] Abnormal-   Mental status  [x] Alert and awake  [] Oriented to person/place/time []Able to follow commands      Eyes:  EOM    [x]  Normal  [] Abnormal-  Sclera  []  Normal  [] Abnormal -         Discharge []  None visible  [] Abnormal -    HENT:   [x] Normocephalic, atraumatic.  [] Abnormal   [] Mouth/Throat: Mucous membranes are moist.     External Ears [x] Normal  [] Abnormal-     Neck: [x] No visualized mass     Pulmonary/Chest: [x] Respiratory effort normal.  [] No visualized signs of difficulty breathing or respiratory distress        [] Abnormal-      Musculoskeletal:   [x] Normal gait with no signs of ataxia         [] Normal range of motion of neck        [] Abnormal-       Neurological:        [x] No Facial Asymmetry (Cranial nerve 7 motor function) (limited exam to video visit)

## 2025-06-05 NOTE — TELEPHONE ENCOUNTER
Patient was advised to hold off driving until he sees neurology.  Patient verbalized understanding

## 2025-06-10 ENCOUNTER — TELEPHONE (OUTPATIENT)
Age: 70
End: 2025-06-10

## 2025-06-10 NOTE — TELEPHONE ENCOUNTER
Spoke with patient at this time; patient will keep referral to [facility] as Dr. Shine is same provider that his wife follows.

## 2025-07-23 ENCOUNTER — OFFICE VISIT (OUTPATIENT)
Age: 70
End: 2025-07-23
Payer: MEDICARE

## 2025-07-23 VITALS
SYSTOLIC BLOOD PRESSURE: 170 MMHG | OXYGEN SATURATION: 98 % | HEART RATE: 102 BPM | DIASTOLIC BLOOD PRESSURE: 90 MMHG | HEIGHT: 67 IN | WEIGHT: 156.4 LBS | BODY MASS INDEX: 24.55 KG/M2

## 2025-07-23 DIAGNOSIS — I95.1 ORTHOSTASIS: ICD-10-CM

## 2025-07-23 DIAGNOSIS — R00.2 PALPITATION: ICD-10-CM

## 2025-07-23 DIAGNOSIS — I49.1 PAC (PREMATURE ATRIAL CONTRACTION): ICD-10-CM

## 2025-07-23 DIAGNOSIS — G45.9 TIA (TRANSIENT ISCHEMIC ATTACK): ICD-10-CM

## 2025-07-23 DIAGNOSIS — I10 PRIMARY HYPERTENSION: Primary | ICD-10-CM

## 2025-07-23 PROCEDURE — 99215 OFFICE O/P EST HI 40 MIN: CPT | Performed by: INTERNAL MEDICINE

## 2025-07-23 PROCEDURE — 1123F ACP DISCUSS/DSCN MKR DOCD: CPT | Performed by: INTERNAL MEDICINE

## 2025-07-23 PROCEDURE — 3080F DIAST BP >= 90 MM HG: CPT | Performed by: INTERNAL MEDICINE

## 2025-07-23 PROCEDURE — 3077F SYST BP >= 140 MM HG: CPT | Performed by: INTERNAL MEDICINE

## 2025-07-23 RX ORDER — CARVEDILOL 3.12 MG/1
3.12 TABLET ORAL 2 TIMES DAILY
Qty: 180 TABLET | Refills: 3 | Status: SHIPPED | OUTPATIENT
Start: 2025-07-23

## 2025-07-23 NOTE — PROGRESS NOTES
History of Present Illness: 70 year-old male here for a followup.  He was hospitalized 05/31/2025 with expressive aphasia and concern for TIA.  Extensive workup including echocardiogram and MRI were performed. He had a monitor in place with frequent ectopy, PACs and PVCs, but no sustained atrial fibrillation.  His home systolic blood pressure is ranging 130s to 160s. It has been switched now to Hydrochlorothiazide 12.5 mg daily and Lisinopril 40 mg daily and he is on aspirin and Plavix. He was taken off Nifedipine due to concern for edema.     Impression:   1. Recent TIA 05/31/2025 with expressive aphasia, now an aspirin and Plavix.   2. History of longstanding hypertension, running higher than usual, now on Hydrochlorothiazide and Lisinopril.   3. Recent echocardiogram 06/2025 with normal EF.   4. Recent event monitor with PACs and PVCs and palpitations, but no sustained atrial arrhythmias, but high risk.   5. Remote syncope, thought to be due to vagal episode 2022.     Plan:  Given his history of vagal episodes with high blood pressure, but also frequent ectopy and palpitations, high risk for atrial fibrillation, I have recommended a subcutaneous loop recorder.      I am also going to try to optimize his blood pressure.  He is currently on the HCTZ and Lisinopril and I am going to start Coreg 3.125 mg twice daily. Have him monitor his blood pressure at home. I'll go ahead and make arrangements.            Wt Readings from Last 3 Encounters:   07/23/25 70.9 kg (156 lb 6.4 oz)   07/08/25 68.9 kg (152 lb)   06/01/25 68.9 kg (152 lb)     Past Medical History:   Diagnosis Date    BPH (benign prostatic hypertrophy) 10/15/2012    COVID-19     ED (erectile dysfunction) 09/30/2011    Hepatitis C     HTN (hypertension) 09/30/2011    Hyperlipidemia 09/30/2011    TIA (transient ischemic attack)        Current Outpatient Medications   Medication Sig Dispense Refill    amoxicillin-clavulanate (AUGMENTIN) 875-125 MG per tablet

## 2025-07-23 NOTE — PATIENT INSTRUCTIONS
[facility]          Patient  EP Instructions    Patient’s Name:  Ezio Andrea    You are scheduled to have a subcutaneous loop recorder implant  on  08/26/2025 , at 0730a.    Please check in at 0630a.    Please go to [facility] and park in the outpatient parking lot that is located around to the back of the hospital and enter through the [facility] [place].   Once you enter through the [facility] check in with the  there.  The  will either give you directions or assist you in getting to the cath holding area.          3.  You are not to eat or drink anything after midnight the night before your procedure.     Please continue to take your medications with a small sip of water on the morning of the procedure with the following exceptions:  none      If you are diabetic, do not take your insulin/sugar pill the morning of the procedure.    We encourage families to wait in the waiting room on the first floor while the procedure is being done.  The Doctor will come out and talk with you as soon as the procedure is over.    There is the possibility that you may spend the night in the hospital, depending on the results of the procedure.  This will be determined after the procedure is done.     8.   If you or your family have any questions, please call our office Monday-Friday 9:00am         -4:30 pm , at [phone], and ask to speak to one of the nurses.

## 2025-07-25 ENCOUNTER — TELEPHONE (OUTPATIENT)
Age: 70
End: 2025-07-25

## 2025-07-25 NOTE — TELEPHONE ENCOUNTER
Patient called office to report pharmacy will not fill the RX for Coreg 3.125 BID due to his allergy to Atenolol.    He was here for office visit on 7/23/25.  Per your note you prescribed for:    I am also going to try to optimize his blood pressure. He is currently on the HCTZ and Lisinopril and I am going to start Coreg 3.125 mg twice daily. Have him monitor his blood pressure at home.

## 2025-07-25 NOTE — TELEPHONE ENCOUNTER
Called and spoke with Jacqueline, pharmacy manager at [facility].  She stated she is not sure why the patient called stating they are not filling the Coreg.  They just put down for a consultation with the patient when he comes to pick it up.  They are in the process of filling medication now and will call patient when ready.

## 2025-07-29 ENCOUNTER — TELEPHONE (OUTPATIENT)
Age: 70
End: 2025-07-29

## 2025-08-04 ENCOUNTER — OFFICE VISIT (OUTPATIENT)
Age: 70
End: 2025-08-04
Payer: MEDICARE

## 2025-08-04 VITALS
WEIGHT: 156.6 LBS | SYSTOLIC BLOOD PRESSURE: 216 MMHG | HEIGHT: 67 IN | OXYGEN SATURATION: 95 % | DIASTOLIC BLOOD PRESSURE: 94 MMHG | BODY MASS INDEX: 24.58 KG/M2 | HEART RATE: 69 BPM

## 2025-08-04 DIAGNOSIS — I10 ESSENTIAL (PRIMARY) HYPERTENSION: ICD-10-CM

## 2025-08-04 DIAGNOSIS — R00.2 PALPITATIONS: ICD-10-CM

## 2025-08-04 DIAGNOSIS — R55 SYNCOPE, UNSPECIFIED SYNCOPE TYPE: Primary | ICD-10-CM

## 2025-08-04 DIAGNOSIS — I1A.0 RESISTANT HYPERTENSION: ICD-10-CM

## 2025-08-04 PROCEDURE — 1123F ACP DISCUSS/DSCN MKR DOCD: CPT | Performed by: PHYSICIAN ASSISTANT

## 2025-08-04 PROCEDURE — 3077F SYST BP >= 140 MM HG: CPT | Performed by: PHYSICIAN ASSISTANT

## 2025-08-04 PROCEDURE — 99214 OFFICE O/P EST MOD 30 MIN: CPT | Performed by: PHYSICIAN ASSISTANT

## 2025-08-04 PROCEDURE — 3080F DIAST BP >= 90 MM HG: CPT | Performed by: PHYSICIAN ASSISTANT

## 2025-08-04 RX ORDER — HYDROCHLOROTHIAZIDE 12.5 MG/1
12.5 CAPSULE ORAL EVERY MORNING
Qty: 30 CAPSULE | Refills: 5 | Status: SHIPPED | OUTPATIENT
Start: 2025-08-04

## 2025-08-12 ENCOUNTER — CLINICAL SUPPORT (OUTPATIENT)
Age: 70
End: 2025-08-12

## 2025-08-12 VITALS — DIASTOLIC BLOOD PRESSURE: 82 MMHG | SYSTOLIC BLOOD PRESSURE: 130 MMHG

## 2025-08-12 DIAGNOSIS — I1A.0 RESISTANT HYPERTENSION: ICD-10-CM

## 2025-08-12 DIAGNOSIS — I10 ESSENTIAL (PRIMARY) HYPERTENSION: Primary | ICD-10-CM

## 2025-08-12 PROCEDURE — 2000F BLOOD PRESSURE MEASURE: CPT | Performed by: INTERNAL MEDICINE

## 2025-08-19 RX ORDER — LISINOPRIL 40 MG/1
40 TABLET ORAL DAILY
Qty: 90 TABLET | Refills: 1 | Status: SHIPPED | OUTPATIENT
Start: 2025-08-19

## 2025-08-21 ENCOUNTER — TELEPHONE (OUTPATIENT)
Age: 70
End: 2025-08-21